# Patient Record
Sex: FEMALE | Race: AMERICAN INDIAN OR ALASKA NATIVE | NOT HISPANIC OR LATINO | Employment: OTHER | URBAN - METROPOLITAN AREA
[De-identification: names, ages, dates, MRNs, and addresses within clinical notes are randomized per-mention and may not be internally consistent; named-entity substitution may affect disease eponyms.]

---

## 2017-01-03 ENCOUNTER — PATIENT MESSAGE (OUTPATIENT)
Dept: NEUROLOGY | Facility: CLINIC | Age: 57
End: 2017-01-03

## 2017-01-03 ENCOUNTER — LAB VISIT (OUTPATIENT)
Dept: LAB | Facility: HOSPITAL | Age: 57
End: 2017-01-03
Attending: PSYCHIATRY & NEUROLOGY
Payer: MEDICARE

## 2017-01-03 DIAGNOSIS — Z79.899 HIGH RISK MEDICATION USE: ICD-10-CM

## 2017-01-03 DIAGNOSIS — G35 MULTIPLE SCLEROSIS: ICD-10-CM

## 2017-01-03 LAB
ALBUMIN SERPL BCP-MCNC: 3.8 G/DL
ALP SERPL-CCNC: 136 U/L
ALT SERPL W/O P-5'-P-CCNC: 32 U/L
AST SERPL-CCNC: 27 U/L
BASOPHILS # BLD AUTO: 0.03 K/UL
BASOPHILS NFR BLD: 0.3 %
BILIRUB DIRECT SERPL-MCNC: 0.2 MG/DL
BILIRUB SERPL-MCNC: 0.4 MG/DL
DIFFERENTIAL METHOD: NORMAL
EOSINOPHIL # BLD AUTO: 0.2 K/UL
EOSINOPHIL NFR BLD: 2.4 %
ERYTHROCYTE [DISTWIDTH] IN BLOOD BY AUTOMATED COUNT: 14.2 %
HCT VFR BLD AUTO: 41 %
HGB BLD-MCNC: 13.5 G/DL
LYMPHOCYTES # BLD AUTO: 3.4 K/UL
LYMPHOCYTES NFR BLD: 35.7 %
MCH RBC QN AUTO: 29.1 PG
MCHC RBC AUTO-ENTMCNC: 32.9 %
MCV RBC AUTO: 88 FL
MONOCYTES # BLD AUTO: 0.8 K/UL
MONOCYTES NFR BLD: 8.1 %
NEUTROPHILS # BLD AUTO: 5.1 K/UL
NEUTROPHILS NFR BLD: 53.3 %
PLATELET # BLD AUTO: 291 K/UL
PMV BLD AUTO: 10.6 FL
PROT SERPL-MCNC: 7.1 G/DL
RBC # BLD AUTO: 4.64 M/UL
WBC # BLD AUTO: 9.63 K/UL

## 2017-01-03 PROCEDURE — 36415 COLL VENOUS BLD VENIPUNCTURE: CPT | Mod: PO

## 2017-01-03 PROCEDURE — 85025 COMPLETE CBC W/AUTO DIFF WBC: CPT

## 2017-01-03 PROCEDURE — 80076 HEPATIC FUNCTION PANEL: CPT

## 2017-01-04 ENCOUNTER — TELEPHONE (OUTPATIENT)
Dept: PAIN MEDICINE | Facility: CLINIC | Age: 57
End: 2017-01-04

## 2017-01-04 NOTE — TELEPHONE ENCOUNTER
Spoke with patient. Informed her that her prescription was ready for . Patient stated she would be here tomorrow morning to pick it up. Made patient a follow up appointment on 1/19.

## 2017-01-13 ENCOUNTER — TELEPHONE (OUTPATIENT)
Dept: PAIN MEDICINE | Facility: CLINIC | Age: 57
End: 2017-01-13

## 2017-01-13 NOTE — TELEPHONE ENCOUNTER
----- Message from Ami Bone sent at 1/12/2017 11:43 AM CST -----  Contact: rizwana Reynas call  Back   MRI appt  Call back  [phone]

## 2017-01-16 ENCOUNTER — TELEPHONE (OUTPATIENT)
Dept: PAIN MEDICINE | Facility: CLINIC | Age: 57
End: 2017-01-16

## 2017-01-16 NOTE — TELEPHONE ENCOUNTER
Spoke with patient. Patient needed to reschedule follow up appointment and is requesting an MRI. Please advise.

## 2017-01-16 NOTE — TELEPHONE ENCOUNTER
----- Message from Josselin Baptiste sent at 1/13/2017  8:36 AM CST -----  Patient missed Sahla's call please call [phone] (cuvn)

## 2017-01-16 NOTE — TELEPHONE ENCOUNTER
Looks like there is an MRI order for the lumbar spine from Dr. Seaman on 11/22/16.  Is she requesting an MRI of the lumbar spine?

## 2017-01-18 ENCOUNTER — PATIENT MESSAGE (OUTPATIENT)
Dept: NEUROLOGY | Facility: CLINIC | Age: 57
End: 2017-01-18

## 2017-01-19 ENCOUNTER — TELEPHONE (OUTPATIENT)
Dept: PAIN MEDICINE | Facility: CLINIC | Age: 57
End: 2017-01-19

## 2017-01-19 DIAGNOSIS — Z13.9 SCREENING: Primary | ICD-10-CM

## 2017-01-19 DIAGNOSIS — M51.36 DDD (DEGENERATIVE DISC DISEASE), LUMBAR: Primary | ICD-10-CM

## 2017-01-19 NOTE — TELEPHONE ENCOUNTER
See email from 11/22/16.  Lumbar spine MRI was ordered so it would be available to neurosurgery.  She was going to see neurosurgery for her low back not her neck.  The injections were helping her neck not her back.

## 2017-01-19 NOTE — TELEPHONE ENCOUNTER
That's what I thought but that order is mixed up. Lumbar MRI with cervical diagnosis. Can you fix it or do we need a new order? Is this with contrast?

## 2017-01-19 NOTE — TELEPHONE ENCOUNTER
Please review patient clinical notes. Unsure what MRI the patient is supposed to have. The order is for lumbar, which looks right based upon her last MRI's but the diagnosis code indicates cervical. Does this need to be with and without and has the patient had back surgery. Please advise.

## 2017-01-19 NOTE — TELEPHONE ENCOUNTER
----- Message from Ling Rudolph sent at 1/18/2017  3:21 PM CST -----  Contact:   call //[phone]    Calling to   Speak to the  Nurse   // please call

## 2017-01-31 ENCOUNTER — LAB VISIT (OUTPATIENT)
Dept: LAB | Facility: HOSPITAL | Age: 57
End: 2017-01-31
Attending: ANESTHESIOLOGY
Payer: MEDICARE

## 2017-01-31 DIAGNOSIS — Z13.9 SCREENING: ICD-10-CM

## 2017-01-31 LAB
CREAT SERPL-MCNC: 0.8 MG/DL
EST. GFR  (AFRICAN AMERICAN): >60 ML/MIN/1.73 M^2
EST. GFR  (NON AFRICAN AMERICAN): >60 ML/MIN/1.73 M^2

## 2017-01-31 PROCEDURE — 36415 COLL VENOUS BLD VENIPUNCTURE: CPT | Mod: PO

## 2017-01-31 PROCEDURE — 82565 ASSAY OF CREATININE: CPT

## 2017-02-01 ENCOUNTER — OFFICE VISIT (OUTPATIENT)
Dept: PAIN MEDICINE | Facility: CLINIC | Age: 57
End: 2017-02-01
Payer: MEDICARE

## 2017-02-01 ENCOUNTER — HOSPITAL ENCOUNTER (OUTPATIENT)
Dept: RADIOLOGY | Facility: HOSPITAL | Age: 57
Discharge: HOME | End: 2017-02-01
Attending: ANESTHESIOLOGY
Payer: MEDICARE

## 2017-02-01 ENCOUNTER — TELEPHONE (OUTPATIENT)
Dept: PAIN MEDICINE | Facility: CLINIC | Age: 57
End: 2017-02-01

## 2017-02-01 VITALS
TEMPERATURE: 99 F | RESPIRATION RATE: 18 BRPM | HEART RATE: 76 BPM | WEIGHT: 212.94 LBS | BODY MASS INDEX: 34.9 KG/M2 | DIASTOLIC BLOOD PRESSURE: 74 MMHG | SYSTOLIC BLOOD PRESSURE: 120 MMHG

## 2017-02-01 DIAGNOSIS — M54.12 CERVICAL RADICULOPATHY: Primary | ICD-10-CM

## 2017-02-01 DIAGNOSIS — E11.9 CONTROLLED TYPE 2 DIABETES MELLITUS WITHOUT COMPLICATION, WITHOUT LONG-TERM CURRENT USE OF INSULIN: ICD-10-CM

## 2017-02-01 DIAGNOSIS — G35 MULTIPLE SCLEROSIS: ICD-10-CM

## 2017-02-01 DIAGNOSIS — M54.16 LUMBAR RADICULOPATHY: ICD-10-CM

## 2017-02-01 DIAGNOSIS — M51.36 DDD (DEGENERATIVE DISC DISEASE), LUMBAR: ICD-10-CM

## 2017-02-01 DIAGNOSIS — M96.1 POSTLAMINECTOMY SYNDROME OF LUMBAR REGION: ICD-10-CM

## 2017-02-01 PROCEDURE — 72148 MRI LUMBAR SPINE W/O DYE: CPT | Mod: 26,,, | Performed by: RADIOLOGY

## 2017-02-01 PROCEDURE — 2022F DILAT RTA XM EVC RTNOPTHY: CPT | Mod: S$GLB,,, | Performed by: PHYSICIAN ASSISTANT

## 2017-02-01 PROCEDURE — 99499 UNLISTED E&M SERVICE: CPT | Mod: S$PBB,,, | Performed by: PHYSICIAN ASSISTANT

## 2017-02-01 PROCEDURE — 3074F SYST BP LT 130 MM HG: CPT | Mod: S$GLB,,, | Performed by: PHYSICIAN ASSISTANT

## 2017-02-01 PROCEDURE — 99214 OFFICE O/P EST MOD 30 MIN: CPT | Mod: S$GLB,,, | Performed by: PHYSICIAN ASSISTANT

## 2017-02-01 PROCEDURE — 99999 PR PBB SHADOW E&M-EST. PATIENT-LVL V: CPT | Mod: PBBFAC,,, | Performed by: PHYSICIAN ASSISTANT

## 2017-02-01 PROCEDURE — 3045F PR MOST RECENT HEMOGLOBIN A1C LEVEL 7.0-9.0%: CPT | Mod: S$GLB,,, | Performed by: PHYSICIAN ASSISTANT

## 2017-02-01 PROCEDURE — 4010F ACE/ARB THERAPY RXD/TAKEN: CPT | Mod: S$GLB,,, | Performed by: PHYSICIAN ASSISTANT

## 2017-02-01 PROCEDURE — 3078F DIAST BP <80 MM HG: CPT | Mod: S$GLB,,, | Performed by: PHYSICIAN ASSISTANT

## 2017-02-01 RX ORDER — HYDROCODONE BITARTRATE AND ACETAMINOPHEN 10; 325 MG/1; MG/1
1 TABLET ORAL 4 TIMES DAILY PRN
Qty: 120 TABLET | Refills: 0 | Status: ON HOLD | OUTPATIENT
Start: 2017-02-17 | End: 2017-03-20 | Stop reason: HOSPADM

## 2017-02-01 RX ORDER — HYDROCODONE BITARTRATE AND ACETAMINOPHEN 10; 325 MG/1; MG/1
1 TABLET ORAL 4 TIMES DAILY PRN
Qty: 120 TABLET | Refills: 0 | Status: SHIPPED | OUTPATIENT
Start: 2017-03-19 | End: 2017-04-18

## 2017-02-01 RX ORDER — MIDAZOLAM HYDROCHLORIDE 5 MG/ML
4 INJECTION INTRAMUSCULAR; INTRAVENOUS ONCE
Status: CANCELLED | OUTPATIENT
Start: 2017-02-21

## 2017-02-01 RX ORDER — ALPRAZOLAM 0.5 MG/1
1 TABLET, ORALLY DISINTEGRATING ORAL ONCE AS NEEDED
Status: CANCELLED | OUTPATIENT
Start: 2017-03-20 | End: 2017-03-20

## 2017-02-01 RX ORDER — SODIUM CHLORIDE, SODIUM LACTATE, POTASSIUM CHLORIDE, CALCIUM CHLORIDE 600; 310; 30; 20 MG/100ML; MG/100ML; MG/100ML; MG/100ML
INJECTION, SOLUTION INTRAVENOUS CONTINUOUS
Status: CANCELLED | OUTPATIENT
Start: 2017-02-21

## 2017-02-01 RX ORDER — HYDROCODONE BITARTRATE AND ACETAMINOPHEN 10; 325 MG/1; MG/1
1 TABLET ORAL 4 TIMES DAILY PRN
Qty: 120 TABLET | Refills: 0 | Status: SHIPPED | OUTPATIENT
Start: 2017-04-18 | End: 2017-05-16 | Stop reason: SDUPTHER

## 2017-02-01 NOTE — TELEPHONE ENCOUNTER
Patient saw Francisco Trivedi this morning and has recommended a cervical steroid injection as well as a lumbar steroid injection. The patient will need to stop aspirin 7 days prior to the injections tentatively scheduled for 2/21 and 3/20, pending medication approval. Please advise.

## 2017-02-01 NOTE — PROGRESS NOTES
This note was completed with dictation software and grammatical errors may exist.    CC: Back pain     HPI: The patient is a 56-year-old woman with a history of hypertension, diabetes, multiple sclerosis, lumbar postlaminectomy syndrome who presents in self-referral for continued low back pain and bilateral leg pain, neck pain.  She returns in follow-up today with neck pain and back pain.  She complains of neck pain radiating to her arms and low back pain radiating throughout the entire right greater than left legs.  She states that her legs only hurt her when walking.  Her neck pain and arm pain is constant and currently her worst pain.  She complains of muscle spasms in her right greater than left feet.  She reports numbness in her hands and right toes.  She complains of right greater than left upper she may weakness and right lower extreme a weakness.  She reports intermittent bladder and bowel incontinence.    Her main complaint today is bilateral upper arm pain, slightly worse on the left side.  It seems to be throughout the biceps and triceps, she denies major shoulder pain, does have some neck pain.  She has pain when at rest, slightly worse with range of motion of the arms, has difficulty and weakness raising her arms overhead or behind her which makes the pain worse.  She denies any constant numbness but does feel weakness throughout her hands that has been occurring more throughout the last month.  She denies any bowel or bladder incontinence.  She continues to have low back pain above her fusion and below her fusion with right leg pain.  She is status post C7-T1 cervical interlaminar epidural steroid injection on 9/7/16 with 100% relief of her bilateral arm pain and mild relief of her neck pain.     Pain intervention history: She has been followed by one of our previous [facility] pain management physicians, Dr. Chapa in [place] until recently.  She had undergone fusion surgery in 1983 and again surgery  in 1993 for her lumbar spine.  She had undergone numerous lumbar spine injections in the past with good relief.  She has tried gabapentin with only minimal relief but states that Lyrica works best.  Sounds like she had a spinal cord stimulator trial in the past that did not help.  She has been taking hydrocodone up to 4 times a day for several years.  She is status post caudal epidural steroid injection on 8/6/14 with 60% relief.  She is status post caudal epidural steroid injection on 5/13/15 with 0% relief.  She is status post caudal epidural steroid injection on 10/2/15 with 0% relief.     ROS:She reports urinary frequency, joint stiffness, joint swelling, back pain, memory loss, anxiety, depression, difficulty sleeping and loss of balance.  Balance of review of systems is negative.    Medical, surgical, family and social history reviewed elsewhere in record.    Medications/Allergies: See med card    Vitals:    02/01/17 0912   BP: 120/74   Pulse: 76   Resp: 18   Temp: 98.9 °F (37.2 °C)   TempSrc: Oral   Weight: 96.6 kg (212 lb 15.4 oz)   PainSc:   6   PainLoc: Shoulder         Physical exam:  Gen: A and O x3, pleasant, well-groomed  Skin: No rashes or obvious lesions  HEENT: PERRLA  CVS: Regular rate and rhythm, normal S1 and S2, no murmurs.  Resp: Clear to auscultation bilaterally, no wheezes or rales.  Abdomen: Soft, NT/ND, normal bowel sounds present.  Musculoskeletal: Antalgic gait, slow cautious, kyphotic stance.      Neuro:  Upper extremities: 5/5 strength bilaterally  Lower extremities: 5/5 strength bilaterally, 4/5 strength right foot dorsiflexion and right hip flexion  Reflexes: Brachioradialis 2+, Bicep 2+, Tricep 0+. Patellar 0+, Achilles 0+ bilaterally.  Sensory: Intact and symmetrical to light touch and pinprick in C2-T1 dermatomes bilaterally.Intact and symmetrical to light touch and pinprick in L2-S1 dermatomes bilaterally.    Lumbar spine:  Lumbar spine: Range of motion is moderately decreased  with flexion and extension with moderate increased bilateral low back pain.    Jeff's test causes no increased pain on either side.    Supine straight leg raise is positive for back pain only.  Internal and external rotation of the hip causes no increased on either side.  Myofascial exam: Mild tenderness to palpation across the lumbar paraspinous muscles.     Cervical range of motion full with flexion and extension and lateral rotation with increased pain on extension and lateral rotation causing pain radiating down into the shoulders.    Spurling's test positive for neck pain and shoulder pain.  Myofascial exam: Mild tenderness palpation to the bilateral cervical paraspinous muscles.    Imaging:  3/11/14 MRI L-spine  At the T9-T10 level a disk protrusion appears to be present of approximately 3 mm not entirely visualized. At least mild central canal stenosis is noted. No obvious cord contact is noted.  At the T10-T11 level, disk protrusion appears to present a 4 mm. Moderate bilateral neural foraminal stenosis is noted. Mild central canal stenosis is noted. No definitive cord contact is noted.  At the T11-T12 level, a broad based disk protrusion is noted of approximately 3 to 4 mm paracentric to the right and left. Mild to moderate bilateral neural foraminal narrowing is noted. Mild central canal narrowing is noted.  At the T12-L1 level, disk protrusion appears to be present paracentric to the right of approximately 4 mm. Mild neuroforaminal narrowing is noted. Mild central canal stenosis is noted.  At the L1-L2 level, it is difficult to assess disk bulge or central canal stenosis or neural foraminal narrowing.  At the L2-L3 level, no significant disk bulge, central canal stenosis, or neural foraminal stenosis noted.  At the L3-L4 level, no significant central canal stenosis or neural foraminal stenosis is suspected. Evaluation is moderately hindered by metallic artifact.  At the L4-L5 level, no significant disk  bulge, central canal stenosis, or neural foraminal stenosis is noted. Metallic artifact moderately hinders evaluation.  At the L5-S1 level, uncovering of the uncovertebral disk is noted. Mild neural foramina narrowing is suspected bilaterally. Mild central canal stenosis is suspected.    2/24/14: Flex/ext Xray: Extensive postsurgical changes noted involving the spine with orthopedic rods along with pedicle screws at the L2 L4 and S1 levels. A mild levocurvature is noted centered at the L1-L2 level  One of the screws presumably a right sided screw at the S1 level appears to have fractured.   Disk prosthesis with partial osseous fusion is suspected at the L2-L3 L3-L4 L4-L5 levels. Intervertebral disk height loss is noted at the L5-S1 level.   An anterior listhesis is noted at the L5-S1 level a 1 cm. this appears stable on flexion and extension  Intervertebral disk height loss is noted at the L1-L2 and T12-L1 levels as well as at the T11-T12 level.    3/11/14 MRI C-spine  C2/C3: Minimal left-sided uncovertebral and is neural foraminal narrowing is noted. The central canal is intact.  C3/C4: Mild annular disk bulge and mild uncovertebral induced neural foraminal narrowing is noted bilaterally. The central canal is intact.  C4/C5: There is annular disk bulging and there is mild to moderate uncovertebral and facet induced neural foraminal narrowing bilaterally.  C5/C6: There is a mild central and right paracentral broad-based disk protrusion and there is prominent degenerative facet disease. This and the anterior subluxation produce mild canal stenosis. The central canal is narrowed to 9 mm. There is moderate uncovertebral and facet induced neural foraminal narrowing bilaterally.  C6/C7: There is mild disk bulging and mild uncovertebral induced neural foraminal narrowing is noted. The cord does not appear contacted.  C7/T1: There is a small right paracentral disk extrusion causing mild distortion of the central canal. The  foramina are intact.    MRI lumbar spine with and without contrast 12/4/15  T11-12:There is chronic disk degeneration with disk dehydration, disk narrowing, vertebral endplate osteophytes, and degenerative vertebral endplate marrow change. There is a diffuse 2 mm posterior disk bulge causing mild dural compression and moderate bilateral foraminal stenosis. There has been no change.  T12-L1:There is chronic disk degeneration with disk dehydration, disk narrowing, vertebral end plate osteophytes, and degenerative T1 plate marrow change. There is a diffuse 2-mm posterior disk bulge causing mild thecal sac compression and moderate bilateral foraminal stenosis. There has been no change.  L1-2:There is a broad-based 3.5-mm posterior disk extrusion causing moderate thecal sac compression and severe bilateral foraminal stenosis. There has been no change.  L2-3:There is intervertebral body fusion which is well united and well aligned. There is no compromise of the spinal canal or foramina and there has been no change.  L3-4:There is intervertebral body fusion which is well united and well aligned. There is no compromise of the spinal canal or foramina and there has been no change.  L4-5:There is intervertebral body fusion which is well united and well aligned. There is no compromise of the spinal canal or foramina and there has been no change.  L5-S1:There is chronic disk degeneration with disk dehydration and narrowing. There is chronic 5-mm anterior L5 subluxation causing severe left and moderate right foraminal stenosis. There has been no change.    MRI lumbar spine 2/1/17  There are postoperative changes of posterior instrumented fusion at L2-S1.  There is a grade I anterolisthesis of L5 on S1.  The lumbar vertebral bodies show normal height without evidence of acute compression fracture or pathologic marrow replacement process.  There is a levoconvex curvature of the lumbar spine. There is degenerative desiccation, disc  space narrowing, and degenerative endplate change at T11-T12, T12-L1, L1-L2, and L5-S1.  There is near complete fusion of the L2, L3, L4, and L5 vertebral bodies.  There are probable postoperative changes of posterior decompression at L2-S1. The conus medullaris terminates at L1. The visualized retroperitoneal/abdominal soft tissue  structures are unremarkable.  T11-T12: There is a broad disc bulge which flattens the ventral aspect of the conus.  There is ligamentum flavum thickening and facet arthropathy.  There is moderate bilateral neuroforaminal stenosis present.  There is mild-moderate central canal stenosis.  T12-L1: There is a broad disc bulge with a superimposed ascending right paracentral disc extrusion which extends approximately 10 mm above the disc plane.  There is mild-moderate central canal stenosis present.  There is mild right neuroforaminal stenosis.  L1-L2: There is a broad disc bulge throughout the period there is ligamentum flavum thickening.  The central canal is obscured.  There is a suggestion of a possible right paracentral disc protrusion.  There is mild-moderate overall central canal stenosis.  The neural foramina are not assessed secondary to metal artifact.  L2-L3: No central canal or neuroforaminal stenosis. No disc protrusion or extrusion.  L3-L4: No central canal or neuroforaminal stenosis. No disc protrusion or extrusion.  L4-L5: The left neural foramen is not optimally evaluated due to metal artifact.  No central canal stenosis or right neuroforaminal stenosis.  L5-S1: There is a grade I anterolisthesis of L5 on S1 with associated uncovering of the intervertebral disc.  No definite central canal stenosis or neuroforaminal stenosis.      Assessment:  The patient is a 56-year-old woman with a history of hypertension, diabetes, multiple sclerosis, lumbar postlaminectomy syndrome who presents in self-referral for continued low back pain and bilateral leg pain, neck pain.   1. Cervical  radiculopathy  Vital signs    Activity as tolerated    Place 18-22 gauage peripheral IV     Verify informed consent    Notify physician     Notify physician     Notify physician (specify)    Diet NPO    Case Request Operating Room: INJECTION-STEROID-EPIDURAL-CERVICAL    Place in Outpatient    lactated ringers infusion    midazolam (PF) 5 mg/mL injection 4 mg   2. Lumbar radiculopathy  Place in Outpatient    Vital signs    Activity as tolerated    Verify informed consent    Notify physician     Notify physician     Notify physician (specify)    Diet NPO    alprazolam ODT dissolvable tablet 1 mg    Case Request Operating Room: ROBIN-TRANSFORAMINAL L2   3. Multiple sclerosis     4. Controlled type 2 diabetes mellitus without complication, without long-term current use of insulin  HEMOGLOBIN A1C   5. Postlaminectomy syndrome of lumbar region                                                                                                                                                                                                 Plan:  1.  I reviewed the patient's lumbar spine MRI with her and we discussed that she has stenosis above her fusion at L1/2.  Currently her neck is bothering her more than her back.  I'll schedule her for a cervical ROBIN followed by bilateral L1 TF ROBIN's 4 weeks later.  She is going to see neurosurgery to discuss if she is a surgical candidate for her back.  I have ordered a hemoglobin A1c to check her blood glucose prior to giving her steroids.  2.  Dr. Seaman provided prescriptions for hydrocodone-acetaminophen 10/325 mg up to 4 times a day as needed for pain.  3.  Follow-up in 4 weeks after the lumbar ROBIN's or sooner as needed.    Greater than 50% of this 25 minute visit was spent on counseling the patient.

## 2017-02-02 ENCOUNTER — TELEPHONE (OUTPATIENT)
Dept: PAIN MEDICINE | Facility: CLINIC | Age: 57
End: 2017-02-02

## 2017-02-02 NOTE — TELEPHONE ENCOUNTER
Please let the patient know that her hemoglobin A1c improved to 7.4 and we can proceed with the epidural steroid injections as planned.

## 2017-02-06 ENCOUNTER — PATIENT MESSAGE (OUTPATIENT)
Dept: SPINE | Facility: CLINIC | Age: 57
End: 2017-02-06

## 2017-02-07 ENCOUNTER — PATIENT MESSAGE (OUTPATIENT)
Dept: NEUROLOGY | Facility: CLINIC | Age: 57
End: 2017-02-07

## 2017-02-16 ENCOUNTER — OFFICE VISIT (OUTPATIENT)
Dept: NEUROLOGY | Facility: CLINIC | Age: 57
End: 2017-02-16
Payer: MEDICARE

## 2017-02-16 ENCOUNTER — LAB VISIT (OUTPATIENT)
Dept: LAB | Facility: HOSPITAL | Age: 57
End: 2017-02-16
Payer: MEDICARE

## 2017-02-16 VITALS
BODY MASS INDEX: 33.43 KG/M2 | WEIGHT: 208 LBS | SYSTOLIC BLOOD PRESSURE: 146 MMHG | DIASTOLIC BLOOD PRESSURE: 89 MMHG | HEIGHT: 66 IN | HEART RATE: 76 BPM

## 2017-02-16 DIAGNOSIS — G35 MULTIPLE SCLEROSIS: Primary | ICD-10-CM

## 2017-02-16 DIAGNOSIS — R26.9 GAIT DISTURBANCE: ICD-10-CM

## 2017-02-16 DIAGNOSIS — Z29.89 PROPHYLACTIC IMMUNOTHERAPY: ICD-10-CM

## 2017-02-16 DIAGNOSIS — R25.2 SPASTICITY: ICD-10-CM

## 2017-02-16 DIAGNOSIS — Z71.89 COUNSELING REGARDING GOALS OF CARE: ICD-10-CM

## 2017-02-16 DIAGNOSIS — G35 MULTIPLE SCLEROSIS: ICD-10-CM

## 2017-02-16 DIAGNOSIS — Z97.3 WEARS GLASSES: ICD-10-CM

## 2017-02-16 DIAGNOSIS — R39.11 URINARY HESITANCY: ICD-10-CM

## 2017-02-16 LAB
BILIRUB UR QL STRIP: NEGATIVE
CLARITY UR REFRACT.AUTO: CLEAR
COLOR UR AUTO: YELLOW
GLUCOSE UR QL STRIP: NEGATIVE
HGB UR QL STRIP: NEGATIVE
KETONES UR QL STRIP: NEGATIVE
LEUKOCYTE ESTERASE UR QL STRIP: NEGATIVE
NITRITE UR QL STRIP: NEGATIVE
PH UR STRIP: 7 [PH] (ref 5–8)
PROT UR QL STRIP: ABNORMAL
SP GR UR STRIP: 1.02 (ref 1–1.03)
URN SPEC COLLECT METH UR: ABNORMAL
UROBILINOGEN UR STRIP-ACNC: 1 EU/DL

## 2017-02-16 PROCEDURE — 99999 PR PBB SHADOW E&M-EST. PATIENT-LVL V: CPT | Mod: PBBFAC,,, | Performed by: CLINICAL NURSE SPECIALIST

## 2017-02-16 PROCEDURE — 87086 URINE CULTURE/COLONY COUNT: CPT

## 2017-02-16 PROCEDURE — 99215 OFFICE O/P EST HI 40 MIN: CPT | Mod: S$GLB,,, | Performed by: CLINICAL NURSE SPECIALIST

## 2017-02-16 PROCEDURE — 3077F SYST BP >= 140 MM HG: CPT | Mod: S$GLB,,, | Performed by: CLINICAL NURSE SPECIALIST

## 2017-02-16 PROCEDURE — 81003 URINALYSIS AUTO W/O SCOPE: CPT

## 2017-02-16 PROCEDURE — 3079F DIAST BP 80-89 MM HG: CPT | Mod: S$GLB,,, | Performed by: CLINICAL NURSE SPECIALIST

## 2017-02-16 RX ORDER — BACLOFEN 10 MG/1
TABLET ORAL
Qty: 45 TABLET | Refills: 3 | Status: SHIPPED | OUTPATIENT
Start: 2017-02-16 | End: 2017-04-06 | Stop reason: SDUPTHER

## 2017-02-16 NOTE — PROGRESS NOTES
Subjective:       Patient ID: Justin Barnes is a 56 y.o. female who presents today for a routine clinic visit for MS. She was last seen in November. The history has been provided by the patient.  MS HPI:  · DMT:Aubagio 14mg daily for about 3 months.  She gets diarrhea every day. Some days are worse than others. She does not have any pain associated with this. She takes Imodium as needed.   · Side effects from DMT: As above, diarrhea, but this is tolerable for her.   · Taking vitamin D3 as recommended? Yes -  Dose: 10,000 IU  She is under a lot of stress right now. Her  has pancreatic cancer and is getting chemo.   Overall, she feels better from an MS perspective, but she continues to struggle with pain and fatigue. She is not sure if any of her pain is coming from MS.  She feels that her pain impairs her walking. She has neck pain and pain in both arms (right worse than left). She has had numbness and tingling in the right fingers since Monday. She sees pain management next Monday and will get an injection in her neck then and a lumbar injection in March. She has an appt to see Back [facility] Sp[facility] soon.   She continues to have headaches, but has not seen Lokesh Santo yet. She takes Aleve, but this is not helping. Some days are worse than others.   She is still smoking, but would like to start this in July, if possible.     SOCIAL HISTORY  Social History   Substance Use Topics    Smoking status: Current Every Day Smoker     Packs/day: 0.50     Types: Cigarettes     Start date: 5/7/1973    Smokeless tobacco: Never Used    Alcohol use No     Living arrangements - the patient lives with her . She is staying in between her son's house and daughter's house. She and her  do not have their own home currently.   Employment: She is on disability.     MS ROS:  · Fatigue: Yes - She has significant fatigue.   · Sleep Disturbance: Yes -Pain interferes with her sleep at night. She feels like her whole  "body is restless at night.   · Bladder Dysfunction: Yes -She denies incontinence. She has trouble emptying completely. She has seen a urologist in the past. She states "my bladder and my rectum has dropped." She thinks she had a UTI since the last visit (about 3 weeks ago). She is waking up less at night since starting oxybutynin at bedtime.   · Bowel Dysfunction: As above, she has diarrhea from Aubagio.   · Spasticity: Yes - She feels like her spasms have improved. She is taking Baclofen 10mg and Tizanidine 8mg at night.   · Visual Symptoms: Yes - She feels like her vision has declined. She needs to see her eye doctor. She is only wearing reading glasses.   · Cognitive: Yes - Stable.   · Mood Disorder: Yes - She feels like her mood has been "really well." She cries a lot, but overall, feels ok. She takes Effexor and Cymbalta.   · Gait Disturbance: Yes - She "starts off good," but walking declines with time or distance.   · Falls: She had a fall about 1.5 weeks ago. She loses her balance at times.   · Hand Dysfunction: Yes - She has impaired fine motor coordination in both hands.   · Pain: Yes - She has chronic back and neck pain pain that limits her activities. She also has arm pain and headaches. She sees pain management.   · Sexual Dysfunction: Not Assessed  · Skin Breakdown: No  · Tremors: No.   · Dysphagia:  No  · Dysarthria:  No.   · Heat sensitivity:  No  · Any un-met adaptive needs? No  · Copay Assist?  $0 copay for Aubagio        Objective:        1. 25 foot timed walk: 5.7 seconds today without assist; was 8.6 seconds at last visit without assist; antalgic gait   Timed 25 Foot Walk: 10/3/2016   Did patient wear an AFO? No   Was assistive device used? No   Time for 25 Foot Walk (seconds) 4.98   Time for 25 Foot Walk (seconds) 5       Neurologic Exam     Mental Status   Oriented to person, place, and time.   Attention: normal.   Speech: speech is normal   Level of consciousness: alert  Knowledge: good. "     Cranial Nerves     CN III, IV, VI   Pupils are equal, round, and reactive to light.  Extraocular motions are normal.   Right pupil: Shape: regular. Reactivity: brisk.   Left pupil: Shape: regular. Reactivity: brisk.   CN III: no CN III palsy  CN VI: no CN VI palsy  Nystagmus: none     CN V   Facial sensation intact.     CN VII   Right facial weakness: none  Left facial weakness: none    CN VIII   Hearing: intact    CN IX, X   Palate: symmetric    CN XI   Right sternocleidomastoid strength: normal  Left sternocleidomastoid strength: normal  Right trapezius strength: normal  Left trapezius strength: normal    CN XII   Tongue deviation: none    Motor Exam     Strength   Right neck flexion: 5/5  Left neck flexion: 5/5  Right neck extension: 5/5  Left neck extension: 5/5  Right deltoid: 5/5  Left deltoid: 5/5  Right biceps: 5/5  Left biceps: 5/5  Right triceps: 5/5  Left triceps: 5/5  Right wrist flexion: 5/5  Left wrist flexion: 5/5  Right wrist extension: 5/5  Left wrist extension: 5/5  Right interossei: 5/5  Left interossei: 5/5  Right iliopsoas: 5/5  Left iliopsoas: 5/5  Right quadriceps: 5/5  Left quadriceps: 5/5  Right hamstrin/5  Left hamstrin/5  Right anterior tibial: 5/5  Left anterior tibial: 5/5  Right gastroc: 5/5  Left gastroc: 5/5    Sensory Exam   Right arm vibration: decreased from fingers  Left arm vibration: decreased from fingers  Right leg vibration: decreased from knee  Left leg vibration: decreased from knee    Gait, Coordination, and Reflexes     Gait  Gait: (antalgic and slightly wide-based)    Coordination   Romberg: positive  Finger to nose coordination: normal  Tandem walking coordination: normal    Tremor   Resting tremor: absent    Reflexes   Right brachioradialis: 2+  Left brachioradialis: 2+  Right biceps: 2+  Left biceps: 2+  Right patellar: 2+  Left patellar: 2+  Right achilles: 2+  Left achilles: 2+  Right plantar: equivocal  Left plantar: equivocal        Labs:       Lab  Results   Component Value Date    WBC 9.26 02/16/2017    HGB 13.8 02/16/2017    HCT 41.9 02/16/2017    MCV 88 02/16/2017     02/16/2017       Lab Results   Component Value Date    ALT 32 01/03/2017    AST 27 01/03/2017    ALKPHOS 136 (H) 01/03/2017    BILITOT 0.4 01/03/2017       Diagnosis/Assessment/Plan:    1. Multiple Sclerosis  · Assessment: Ms. Barnes's timed walk is nearly 3 seconds faster today than at last visit, and the remainder of her exam is stable. She has some mild swelling and pain in her right arm today that is different than her usual pain, and I have advised that she go to urgent care or PCP if this worsens. It is not warm to touch, but she reports that it is tender to touch. Her neck and back pain, in general, affect her gait and movement, but overall, things are improved today. She will follow up with pain management and back and spine as scheduled.   · Imaging: Will plan for brain MRI 6 months after Aubagio start--June 2017. MRI ordered today.   · Disease Modifying Therapies: Continue Aubagio. Will continue to do CBC and LFTs monthly for 1st 6 months of treatment, then every 3 months thereafter. Labs were done this morning. Continue Vitamin D. Will check Vitamin D with next month's labs.     2. MS Symptom Assessment / Management  · Bladder Dysfunction: Continue oxybutynin. Will check UA and culture today to make sure infection is gone. Will also refer to urology.   · Spasticity: Continue taking Baclofen 10mg at bedtime and 5mg midday. Rx refilled today.   · Vision: Referral for optometry entered.   · Mood Disorder: Continue Effexor and Cymbalta.   · Gait Disturbance:We will plan to order PT and aquatherapy during the summer when she has a more established residence and her  has finished chemo.   · Pain: Follow-up with pain management and   · Other: Patients plans to pursue smoking cessation program during the summer.     Over 50% of this 50 minute visit was spent in direct face to  face counseling of the patient about MS and the management of her symptoms.The patient agrees with the plan of care. She will follow up with Dr. Martinez in June after MRI.     There are no diagnoses linked to this encounter.

## 2017-02-18 LAB — BACTERIA UR CULT: NORMAL

## 2017-02-20 RX ORDER — TERIFLUNOMIDE 14 MG/1
TABLET, FILM COATED ORAL
COMMUNITY
End: 2017-05-01 | Stop reason: SDUPTHER

## 2017-02-21 ENCOUNTER — SURGERY (OUTPATIENT)
Age: 57
End: 2017-02-21

## 2017-02-21 ENCOUNTER — HOSPITAL ENCOUNTER (OUTPATIENT)
Dept: RADIOLOGY | Facility: HOSPITAL | Age: 57
Discharge: HOME | End: 2017-02-21
Attending: ANESTHESIOLOGY
Payer: MEDICARE

## 2017-02-21 ENCOUNTER — HOSPITAL ENCOUNTER (OUTPATIENT)
Facility: HOSPITAL | Age: 57
Discharge: HOME | End: 2017-02-21
Attending: ANESTHESIOLOGY | Admitting: ANESTHESIOLOGY
Payer: MEDICARE

## 2017-02-21 VITALS
OXYGEN SATURATION: 95 % | RESPIRATION RATE: 16 BRPM | WEIGHT: 208 LBS | SYSTOLIC BLOOD PRESSURE: 165 MMHG | BODY MASS INDEX: 33.43 KG/M2 | HEART RATE: 78 BPM | TEMPERATURE: 98 F | HEIGHT: 66 IN | DIASTOLIC BLOOD PRESSURE: 79 MMHG

## 2017-02-21 DIAGNOSIS — E11.9 CONTROLLED TYPE 2 DIABETES MELLITUS WITHOUT COMPLICATION, WITHOUT LONG-TERM CURRENT USE OF INSULIN: ICD-10-CM

## 2017-02-21 DIAGNOSIS — M50.30 DDD (DEGENERATIVE DISC DISEASE), CERVICAL: ICD-10-CM

## 2017-02-21 DIAGNOSIS — M54.12 CERVICAL RADICULOPATHY: Primary | ICD-10-CM

## 2017-02-21 LAB — GLUCOSE SERPL-MCNC: 85 MG/DL (ref 70–110)

## 2017-02-21 PROCEDURE — 82962 GLUCOSE BLOOD TEST: CPT | Mod: PO | Performed by: ANESTHESIOLOGY

## 2017-02-21 PROCEDURE — 62320 NJX INTERLAMINAR CRV/THRC: CPT | Mod: PO | Performed by: ANESTHESIOLOGY

## 2017-02-21 PROCEDURE — 62321 NJX INTERLAMINAR CRV/THRC: CPT | Mod: ,,, | Performed by: ANESTHESIOLOGY

## 2017-02-21 PROCEDURE — 63600175 PHARM REV CODE 636 W HCPCS: Mod: PO | Performed by: ANESTHESIOLOGY

## 2017-02-21 PROCEDURE — 62321 NJX INTERLAMINAR CRV/THRC: CPT | Mod: PO | Performed by: ANESTHESIOLOGY

## 2017-02-21 PROCEDURE — 25500020 PHARM REV CODE 255: Mod: PO | Performed by: ANESTHESIOLOGY

## 2017-02-21 PROCEDURE — 25000003 PHARM REV CODE 250: Mod: PO | Performed by: ANESTHESIOLOGY

## 2017-02-21 RX ORDER — LIDOCAINE HYDROCHLORIDE 10 MG/ML
INJECTION INFILTRATION; PERINEURAL
Status: DISCONTINUED | OUTPATIENT
Start: 2017-02-21 | End: 2017-02-21 | Stop reason: HOSPADM

## 2017-02-21 RX ORDER — LIDOCAINE HYDROCHLORIDE 10 MG/ML
1 INJECTION INFILTRATION; PERINEURAL ONCE
Status: COMPLETED | OUTPATIENT
Start: 2017-02-21 | End: 2017-02-21

## 2017-02-21 RX ORDER — MIDAZOLAM HYDROCHLORIDE 1 MG/ML
4 INJECTION INTRAMUSCULAR; INTRAVENOUS ONCE
Status: COMPLETED | OUTPATIENT
Start: 2017-02-21 | End: 2017-02-21

## 2017-02-21 RX ORDER — SODIUM CHLORIDE 9 MG/ML
INJECTION, SOLUTION INTRAMUSCULAR; INTRAVENOUS; SUBCUTANEOUS
Status: DISCONTINUED | OUTPATIENT
Start: 2017-02-21 | End: 2017-02-21 | Stop reason: HOSPADM

## 2017-02-21 RX ORDER — METHYLPREDNISOLONE ACETATE 80 MG/ML
INJECTION, SUSPENSION INTRA-ARTICULAR; INTRALESIONAL; INTRAMUSCULAR; SOFT TISSUE
Status: DISCONTINUED | OUTPATIENT
Start: 2017-02-21 | End: 2017-02-21 | Stop reason: HOSPADM

## 2017-02-21 RX ORDER — SODIUM CHLORIDE, SODIUM LACTATE, POTASSIUM CHLORIDE, CALCIUM CHLORIDE 600; 310; 30; 20 MG/100ML; MG/100ML; MG/100ML; MG/100ML
INJECTION, SOLUTION INTRAVENOUS CONTINUOUS
Status: DISCONTINUED | OUTPATIENT
Start: 2017-02-21 | End: 2017-02-21 | Stop reason: HOSPADM

## 2017-02-21 RX ADMIN — SODIUM CHLORIDE, SODIUM LACTATE, POTASSIUM CHLORIDE, AND CALCIUM CHLORIDE: .6; .31; .03; .02 INJECTION, SOLUTION INTRAVENOUS at 12:02

## 2017-02-21 RX ADMIN — LIDOCAINE HYDROCHLORIDE 1 ML: 10 INJECTION, SOLUTION EPIDURAL; INFILTRATION; INTRACAUDAL; PERINEURAL at 12:02

## 2017-02-21 RX ADMIN — MIDAZOLAM HYDROCHLORIDE 2 MG: 1 INJECTION, SOLUTION INTRAMUSCULAR; INTRAVENOUS at 01:02

## 2017-02-21 RX ADMIN — SODIUM CHLORIDE 4 ML: 9 INJECTION INTRAMUSCULAR; INTRAVENOUS; SUBCUTANEOUS at 01:02

## 2017-02-21 RX ADMIN — IOHEXOL 3 ML: 300 INJECTION, SOLUTION INTRAVENOUS at 01:02

## 2017-02-21 RX ADMIN — METHYLPREDNISOLONE ACETATE 80 MG: 80 INJECTION, SUSPENSION INTRA-ARTICULAR; INTRALESIONAL; INTRAMUSCULAR; SOFT TISSUE at 01:02

## 2017-02-21 RX ADMIN — LIDOCAINE HYDROCHLORIDE 5 ML: 10 INJECTION, SOLUTION INFILTRATION; PERINEURAL at 01:02

## 2017-02-21 NOTE — IP AVS SNAPSHOT
[facility]  [address]  [place] [address]  Phone: [phone]           Patient Discharge Instructions     Our goal is to set you up for success. This packet includes information on your condition, medications, and your home care. It will help you to care for yourself so you don't get sicker and need to go back to the hospital.     Please ask your nurse if you have any questions.        There are many details to remember when preparing to leave the hospital. Here is what you will need to do:    1. Take your medicine. If you are prescribed medications, review your Medication List in the following pages. You may have new medications to  at the pharmacy and others that you'll need to stop taking. Review the instructions for how and when to take your medications. Talk with your doctor or nurses if you are unsure of what to do.     2. Go to your follow-up appointments. Specific follow-up information is listed in the following pages. Your may be contacted by a transition nurse or clinical provider about future appointments. Be sure we have all of the phone numbers to reach you, if needed. Please contact your provider's office if you are unable to make an appointment.     3. Watch for warning signs. Your doctor or nurse will give you detailed warning signs to watch for and when to call for assistance. These instructions may also include educational information about your condition. If you experience any of warning signs to your health, call your doctor.               [facility]  Unless otherwise directed by your provider, please contact [facility], our nurse care line that is available for 24/7 assistance.     [phone] (toll-free)    Registered nurses in the [facility] provide clinical advisement, health education, appointment booking, and other advisory services.                    ** Verify the list of medication(s) below is accurate and up  to date. Carry this with you in case of emergency. If your medications have changed, please notify your healthcare provider.             Medication List      CONTINUE taking these medications        Additional Info                      amlodipine-benazepril 10-20mg 10-20 mg per capsule   Commonly known as:  LOTREL   Quantity:  90 capsule   Refills:  1   Comments:  PATIENT WANTS 90 DAYS SUPPLY JUST LEFT OFFICE AND SAID SHE REQUESTED THAT    Instructions:  Take 1 capsule by mouth once daily.     Begin Date    AM    Noon    PM    Bedtime       aspirin 81 MG EC tablet   Commonly known as:  ECOTRIN   Refills:  0   Dose:  81 mg    Instructions:  Take 1 tablet (81 mg total) by mouth once daily.     Begin Date    AM    Noon    PM    Bedtime       atorvastatin 80 MG tablet   Commonly known as:  LIPITOR   Quantity:  90 tablet   Refills:  3   Dose:  80 mg    Instructions:  Take 1 tablet (80 mg total) by mouth once daily.     Begin Date    AM    Noon    PM    Bedtime       AUBAGIO 14 mg Tab   Refills:  0   Generic drug:  teriflunomide    Instructions:  Take by mouth.     Begin Date    AM    Noon    PM    Bedtime       * baclofen 10 MG tablet   Commonly known as:  LIORESAL   Quantity:  90 tablet   Refills:  3   Comments:  PATIENT SAID HE IS TAKING 1/2 TABLET AT NOON ANT 1 TABLET QPM    Instructions:  Take 1 tablet (10 mg total) by mouth every evening.     Begin Date    AM    Noon    PM    Bedtime       * baclofen 10 MG tablet   Commonly known as:  LIORESAL   Quantity:  45 tablet   Refills:  3    Instructions:  Take 1/2 tab at noon and 1 tab at bedtime.     Begin Date    AM    Noon    PM    Bedtime       blood sugar diagnostic Strp   Commonly known as:  BLOOD GLUCOSE TEST   Quantity:  100 strip   Refills:  prn    Instructions:  Test glucose 1 x daily     Begin Date    AM    Noon    PM    Bedtime       blood-glucose meter Misc   Quantity:  1 each   Refills:  prn    Instructions:  Use as directed     Begin Date    AM    Noon    PM     Bedtime       diclofenac sodium 1 % Gel   Commonly known as:  VOLTAREN   Quantity:  1 Tube   Refills:  1   Dose:  2 g    Instructions:  Apply 2 g topically 4 (four) times daily as needed.     Begin Date    AM    Noon    PM    Bedtime       duloxetine 60 MG capsule   Commonly known as:  CYMBALTA   Quantity:  90 capsule   Refills:  3   Dose:  60 mg    Instructions:  Take 1 capsule (60 mg total) by mouth once daily.     Begin Date    AM    Noon    PM    Bedtime       * hydrocodone-acetaminophen 10-325mg  mg Tab   Commonly known as:  NORCO   Quantity:  120 tablet   Refills:  0   Dose:  1 tablet    Instructions:  Take 1 tablet by mouth 4 (four) times daily as needed.     Begin Date    AM    Noon    PM    Bedtime       * hydrocodone-acetaminophen 10-325mg  mg Tab   Commonly known as:  NORCO   Quantity:  120 tablet   Refills:  0   Dose:  1 tablet    Start Date:  3/19/2017   Instructions:  Take 1 tablet by mouth 4 (four) times daily as needed.     Begin Date    AM    Noon    PM    Bedtime       * hydrocodone-acetaminophen 10-325mg  mg Tab   Commonly known as:  NORCO   Quantity:  120 tablet   Refills:  0   Dose:  1 tablet    Start Date:  4/18/2017   Instructions:  Take 1 tablet by mouth 4 (four) times daily as needed.     Begin Date    AM    Noon    PM    Bedtime       isosorbide mononitrate 30 MG 24 hr tablet   Commonly known as:  IMDUR   Quantity:  90 tablet   Refills:  1   Dose:  30 mg    Instructions:  Take 1 tablet (30 mg total) by mouth once daily.     Begin Date    AM    Noon    PM    Bedtime       lancets Misc   Quantity:  100 each   Refills:  prn    Instructions:  As directed     Begin Date    AM    Noon    PM    Bedtime       levocetirizine 5 MG tablet   Commonly known as:  XYZAL   Quantity:  30 tablet   Refills:  11   Dose:  5 mg    Instructions:  Take 1 tablet (5 mg total) by mouth every evening.     Begin Date    AM    Noon    PM    Bedtime       metformin 500 MG 24 hr tablet   Commonly  known as:  GLUCOPHAGE-XR   Quantity:  360 tablet   Refills:  1   Comments:  PATIENT WANTS 90 DAYS SUPPLY JUST LEFT OFFICE AND SAID SHE REQUESTED THAT    Instructions:  TAKE 2 TABLETS BY MOUTH TWICE DAILY WITH MEALS     Begin Date    AM    Noon    PM    Bedtime       metoprolol tartrate 25 MG tablet   Commonly known as:  LOPRESSOR   Quantity:  180 tablet   Refills:  1   Comments:  PATIENT WANTS 90 DAYS SUPPLY JUST LEFT OFFICE AND SAID SHE REQUESTED THAT    Instructions:  TAKE ONE TABLET BY MOUTH TWICE DAILY     Begin Date    AM    Noon    PM    Bedtime       nitroGLYCERIN 0.4 MG SL tablet   Commonly known as:  NITROSTAT   Quantity:  27 tablet   Refills:  0   Dose:  0.4 mg    Instructions:  Place 1 tablet (0.4 mg total) under the tongue every 5 (five) minutes as needed for Chest pain.     Begin Date    AM    Noon    PM    Bedtime       omeprazole 20 MG capsule   Commonly known as:  PRILOSEC   Quantity:  180 capsule   Refills:  1   Comments:  PATIENT WANTS 90 DAYS SUPPLY JUST LEFT OFFICE AND SAID SHE REQUESTED THAT    Instructions:  TWO CAPSULES BY MOUTH EVERY DAY     Begin Date    AM    Noon    PM    Bedtime       oxybutynin 5 MG Tr24   Commonly known as:  DITROPAN-XL   Quantity:  30 tablet   Refills:  5   Dose:  5 mg    Instructions:  Take 1 tablet (5 mg total) by mouth once daily.     Begin Date    AM    Noon    PM    Bedtime       pregabalin 100 MG capsule   Commonly known as:  LYRICA   Quantity:  270 capsule   Refills:  1   Comments:  patirnt would like 90 days supply    Instructions:  TAKE ONE CAPSULE BY MOUTH THREE TIMES DAILY *MAY CAUSE DROWSINESS* ** NO ALCOHOL **     Begin Date    AM    Noon    PM    Bedtime       tizanidine 4 MG tablet   Commonly known as:  ZANAFLEX   Quantity:  270 tablet   Refills:  1   Comments:  PATIENT WANTS 90 DAYS SUPPLY JUST LEFT OFFICE AND SAID SHE REQUESTED THAT    Instructions:  TAKE ONE TABLET BY MOUTH THREE TIMES DAILY AS NEEDED *MAY CAUSE DROWSINESS*     Begin Date    AM     Noon    PM    Bedtime       venlafaxine 225 mg Tr24   Quantity:  90 each   Refills:  1   Comments:  PATIENT WANTS 90 DAYS SUPPLY JUST LEFT OFFICE AND SAID SHE REQUESTED THAT    Instructions:  TAKE ONE TABLET BY MOUTH DAILY     Begin Date    AM    Noon    PM    Bedtime       vitamin D 1000 units Tab   Refills:  0   Dose:  25887 mg    Instructions:  Take 10,000 mg by mouth once daily.     Begin Date    AM    Noon    PM    Bedtime       * Notice:  This list has 5 medication(s) that are the same as other medications prescribed for you. Read the directions carefully, and ask your doctor or other care provider to review them with you.               Please bring to all follow up appointments:    1. A copy of your discharge instructions.  2. All medicines you are currently taking in their original bottles.  3. Identification and insurance card.    Please arrive 15 minutes ahead of scheduled appointment time.    Please call 24 hours in advance if you must reschedule your appointment and/or time.        Your Scheduled Appointments     Feb 27, 2017  2:45 PM CST   Back & Spine Consult with Fan Dennison MD   [facility] [place]    [address]  [address]  [facility] [address]   [phone]            Mar 09, 2017 10:00 AM CST   Consult with ZAK Howe oscar - Optometry ([facility] )    [address]  [place] [address]   [phone]            Mar 16, 2017 10:00 AM CDT   Non-Fasting Lab with LAB, APPOINTMENT [place] [facility])    [address] [address]   [phone]            Mar 30, 2017  8:15 AM CDT   Non-Fasting Lab with [facility]   [facility] ([facility])    [address]  [place] [address]   [phone]            Apr 06, 2017 10:20 AM CDT   Consult with Linda Lechuga MD   [facility] 4th [facility] ([facility] )    [address]  [facility] [address] [phone]              Your Future Surgeries/Procedures     Mar 20, 2017   Surgery with Jeffry Seaman MD   [facility] ([place])    [address] [facility] [place] [address]   [phone]                Discharge Instructions     Future Orders    Activity as tolerated     Call MD for:  redness, tenderness, or signs of infection (pain, swelling, redness, odor or green/yellow discharge around incision site)     Call MD for:  severe persistent headache     Call MD for:  severe uncontrolled pain     Call MD for:  temperature >100.4     Diet general     Questions:    Total calories:      Fat restriction, if any:      Protein restriction, if any:      Na restriction, if any:      Fluid restriction:      Additional restrictions:      No dressing needed         Discharge Instructions       Home care instructions  Apply ice pack to the injection site for 20 minutes periods for the first 24 hrs for soreness/discomfort at injection site DO NOT USE HEAT FOR 24 HOURS  Keep site clean and dry for 24 hours, remove bandaid when desired  Do not drive until tomorrow  Take care when walking after a lumbar injection  Avoid strenuous activities for 2 days  Make take 2 weeks to feel the full effects   Resume home medication as prescribed today  Resume Aspirin, Plavix, or Coumadin the day after the procedure unless otherwise instructed.    SEE IMMEDIATE MEDICAL HELP FOR:  Severe increase in your usual pain or appearance of new pain  Prolonged or increasing weakness or numbness in the legs or arms  Drainage, redness, active bleeding, or increased swelling at the injection site  Temperature over 100.0 degrees F.  Headache that increases when your head is upright and decreases when you lie flat    CALL 911 OR GO DIRECTLY TO EMERGENCY DEPARTMENT FOR:  Shortness of breath, chest pain, or problems breathing      Primary Diagnosis     Your primary diagnosis was:  Cervical Nerve  "Root Disorder      Admission Information     Date & Time Provider Department CSN    2/21/2017 12:00 PM Jeffry Seaman MD [facility] [id number]      Care Providers     Provider Role Specialty Primary office phone    Jeffry Seaman MD Attending Provider Pain Medicine [phone]    Jeffry Seaman MD Surgeon  Pain Medicine [phone]      Your Vitals Were     BP Pulse Temp Resp Height Weight    157/84 79 98.1 °F (36.7 °C) (Oral) 18 5' 5.5" (1.664 m) 94.3 kg (208 lb)    SpO2 BMI             96% 34.09 kg/m2         Recent Lab Values        3/5/2014 9/3/2014 4/23/2015 1/11/2016 6/12/2016 6/28/2016 10/18/2016 2/1/2017      7:40 AM  7:50 AM 11:10 AM  8:05 AM 11:27 PM  8:56 AM 10:10 AM 11:30 AM    A1C 7.2 (H) 7.2 (H) 7.3 (H) 8.3 (H) 8.1 (H) 7.9 (H) 8.1 (H) 7.4 (H)    Comment for A1C at 10:10 AM on 10/18/2016:  According to ADA guidelines, hemoglobin A1C <7.0% represents  optimal control in non-pregnant diabetic patients.  Different  metrics may apply to specific populations.   Standards of Medical Care in Diabetes - 2016.  For the purpose of screening for the presence of diabetes:  <5.7%     Consistent with the absence of diabetes  5.7-6.4%  Consistent with increasing risk for diabetes   (prediabetes)  >or=6.5%  Consistent with diabetes  Currently no consensus exists for use of hemoglobin A1C  for diagnosis of diabetes for children.      Comment for A1C at 11:30 AM on 2/1/2017:  According to ADA guidelines, hemoglobin A1C <7.0% represents  optimal control in non-pregnant diabetic patients.  Different  metrics may apply to specific populations.   Standards of Medical Care in Diabetes - 2016.  For the purpose of screening for the presence of diabetes:  <5.7%     Consistent with the absence of diabetes  5.7-6.4%  Consistent with increasing risk for diabetes   (prediabetes)  >or=6.5%  Consistent with diabetes  Currently no consensus exists for use of hemoglobin A1C  for diagnosis of diabetes " for children.        Allergies as of 2/21/2017        Reactions    Amoxil [Amoxicillin] Itching, Rash    Latex, Natural Rubber Hives, Swelling      Advance Directives     An advance directive is a document which, in the event you are no longer able to make decisions for yourself, tells your healthcare team what kind of treatment you do or do not want to receive, or who you would like to make those decisions for you.  If you do not currently have an advance directive, [facility] encourages you to create one.  For more information call:  [phone]), [phone] ([phone]),  or log on to [email or web address].        Smoking Cessation     If you would like to quit smoking:   You may be eligible for free services if you are a [place] resident and started smoking cigarettes before September 1, 1988.  Call the Smoking Cessation Trust (SCT) toll free at (294) 606-0755 or (357) 599-5273.   Call 3-130-QUIT-NOW if you do not meet the above criteria.            Language Assistance Services     ATTENTION: Language assistance services are available, free of charge. Please call [phone].      ATENCIÓN: Si habla español, tiene a phillips disposición servicios gratuitos de asistencia lingüística. Llame al [phone].     [facility] Ý: N?u b?n nói Ti?ng Vi?t, có các d?ch v? h? tr? ngôn ng? mi?n phí dành cho b?n. G?i s? [phone].         [facility] complies with applicable Federal civil rights laws and does not discriminate on the basis of race, color, national origin, age, disability, or sex.

## 2017-02-21 NOTE — DISCHARGE SUMMARY
[facility]  Discharge Note  Short Stay    Admit Date: 2/21/2017    Discharge Date: 2/21/2017    Attending Physician: Jeffry Seaman MD     Discharge Provider: Jeffry Seaman    Diagnoses:  Active Hospital Problems    Diagnosis  POA    *Cervical radiculopathy [M54.12]  Yes      Resolved Hospital Problems    Diagnosis Date Resolved POA   No resolved problems to display.       Discharged Condition: good    Final Diagnoses: Cervical radiculopathy [M54.12]    Disposition: Home or Self Care    Hospital Course: no complications, uneventful    Outcome of Hospitalization, Treatment, Procedure, or Surgery:  Patient was admitted for outpatient procedure. The patient underwent procedure without complications and are discharged home    Follow up/Patient Instructions:  Follow up as scheduled/Patient has received instructions and follow up date    Medications:  Continue previous medications      Discharge Procedure Orders  Diet general     Activity as tolerated     Call MD for:  temperature >100.4     Call MD for:  severe uncontrolled pain     Call MD for:  redness, tenderness, or signs of infection (pain, swelling, redness, odor or green/yellow discharge around incision site)     Call MD for:  severe persistent headache     No dressing needed           Discharge Procedure Orders (must include Diet, Follow-up, Activity):    Discharge Procedure Orders (must include Diet, Follow-up, Activity)  Diet general     Activity as tolerated     Call MD for:  temperature >100.4     Call MD for:  severe uncontrolled pain     Call MD for:  redness, tenderness, or signs of infection (pain, swelling, redness, odor or green/yellow discharge around incision site)     Call MD for:  severe persistent headache     No dressing needed

## 2017-02-21 NOTE — H&P (VIEW-ONLY)
This note was completed with dictation software and grammatical errors may exist.    CC: Back pain     HPI: The patient is a 56-year-old woman with a history of hypertension, diabetes, multiple sclerosis, lumbar postlaminectomy syndrome who presents in self-referral for continued low back pain and bilateral leg pain, neck pain.  She returns in follow-up today with neck pain and back pain.  She complains of neck pain radiating to her arms and low back pain radiating throughout the entire right greater than left legs.  She states that her legs only hurt her when walking.  Her neck pain and arm pain is constant and currently her worst pain.  She complains of muscle spasms in her right greater than left feet.  She reports numbness in her hands and right toes.  She complains of right greater than left upper she may weakness and right lower extreme a weakness.  She reports intermittent bladder and bowel incontinence.    Her main complaint today is bilateral upper arm pain, slightly worse on the left side.  It seems to be throughout the biceps and triceps, she denies major shoulder pain, does have some neck pain.  She has pain when at rest, slightly worse with range of motion of the arms, has difficulty and weakness raising her arms overhead or behind her which makes the pain worse.  She denies any constant numbness but does feel weakness throughout her hands that has been occurring more throughout the last month.  She denies any bowel or bladder incontinence.  She continues to have low back pain above her fusion and below her fusion with right leg pain.  She is status post C7-T1 cervical interlaminar epidural steroid injection on 9/7/16 with 100% relief of her bilateral arm pain and mild relief of her neck pain.     Pain intervention history: She has been followed by one of our previous [facility] pain management physicians, Dr. Chapa in [place] until recently.  She had undergone fusion surgery in 1983 and again surgery  in 1993 for her lumbar spine.  She had undergone numerous lumbar spine injections in the past with good relief.  She has tried gabapentin with only minimal relief but states that Lyrica works best.  Sounds like she had a spinal cord stimulator trial in the past that did not help.  She has been taking hydrocodone up to 4 times a day for several years.  She is status post caudal epidural steroid injection on 8/6/14 with 60% relief.  She is status post caudal epidural steroid injection on 5/13/15 with 0% relief.  She is status post caudal epidural steroid injection on 10/2/15 with 0% relief.     ROS:She reports urinary frequency, joint stiffness, joint swelling, back pain, memory loss, anxiety, depression, difficulty sleeping and loss of balance.  Balance of review of systems is negative.    Medical, surgical, family and social history reviewed elsewhere in record.    Medications/Allergies: See med card    Vitals:    02/01/17 0912   BP: 120/74   Pulse: 76   Resp: 18   Temp: 98.9 °F (37.2 °C)   TempSrc: Oral   Weight: 96.6 kg (212 lb 15.4 oz)   PainSc:   6   PainLoc: Shoulder         Physical exam:  Gen: A and O x3, pleasant, well-groomed  Skin: No rashes or obvious lesions  HEENT: PERRLA  CVS: Regular rate and rhythm, normal S1 and S2, no murmurs.  Resp: Clear to auscultation bilaterally, no wheezes or rales.  Abdomen: Soft, NT/ND, normal bowel sounds present.  Musculoskeletal: Antalgic gait, slow cautious, kyphotic stance.      Neuro:  Upper extremities: 5/5 strength bilaterally  Lower extremities: 5/5 strength bilaterally, 4/5 strength right foot dorsiflexion and right hip flexion  Reflexes: Brachioradialis 2+, Bicep 2+, Tricep 0+. Patellar 0+, Achilles 0+ bilaterally.  Sensory: Intact and symmetrical to light touch and pinprick in C2-T1 dermatomes bilaterally.Intact and symmetrical to light touch and pinprick in L2-S1 dermatomes bilaterally.    Lumbar spine:  Lumbar spine: Range of motion is moderately decreased  with flexion and extension with moderate increased bilateral low back pain.    Jeff's test causes no increased pain on either side.    Supine straight leg raise is positive for back pain only.  Internal and external rotation of the hip causes no increased on either side.  Myofascial exam: Mild tenderness to palpation across the lumbar paraspinous muscles.     Cervical range of motion full with flexion and extension and lateral rotation with increased pain on extension and lateral rotation causing pain radiating down into the shoulders.    Spurling's test positive for neck pain and shoulder pain.  Myofascial exam: Mild tenderness palpation to the bilateral cervical paraspinous muscles.    Imaging:  3/11/14 MRI L-spine  At the T9-T10 level a disk protrusion appears to be present of approximately 3 mm not entirely visualized. At least mild central canal stenosis is noted. No obvious cord contact is noted.  At the T10-T11 level, disk protrusion appears to present a 4 mm. Moderate bilateral neural foraminal stenosis is noted. Mild central canal stenosis is noted. No definitive cord contact is noted.  At the T11-T12 level, a broad based disk protrusion is noted of approximately 3 to 4 mm paracentric to the right and left. Mild to moderate bilateral neural foraminal narrowing is noted. Mild central canal narrowing is noted.  At the T12-L1 level, disk protrusion appears to be present paracentric to the right of approximately 4 mm. Mild neuroforaminal narrowing is noted. Mild central canal stenosis is noted.  At the L1-L2 level, it is difficult to assess disk bulge or central canal stenosis or neural foraminal narrowing.  At the L2-L3 level, no significant disk bulge, central canal stenosis, or neural foraminal stenosis noted.  At the L3-L4 level, no significant central canal stenosis or neural foraminal stenosis is suspected. Evaluation is moderately hindered by metallic artifact.  At the L4-L5 level, no significant disk  bulge, central canal stenosis, or neural foraminal stenosis is noted. Metallic artifact moderately hinders evaluation.  At the L5-S1 level, uncovering of the uncovertebral disk is noted. Mild neural foramina narrowing is suspected bilaterally. Mild central canal stenosis is suspected.    2/24/14: Flex/ext Xray: Extensive postsurgical changes noted involving the spine with orthopedic rods along with pedicle screws at the L2 L4 and S1 levels. A mild levocurvature is noted centered at the L1-L2 level  One of the screws presumably a right sided screw at the S1 level appears to have fractured.   Disk prosthesis with partial osseous fusion is suspected at the L2-L3 L3-L4 L4-L5 levels. Intervertebral disk height loss is noted at the L5-S1 level.   An anterior listhesis is noted at the L5-S1 level a 1 cm. this appears stable on flexion and extension  Intervertebral disk height loss is noted at the L1-L2 and T12-L1 levels as well as at the T11-T12 level.    3/11/14 MRI C-spine  C2/C3: Minimal left-sided uncovertebral and is neural foraminal narrowing is noted. The central canal is intact.  C3/C4: Mild annular disk bulge and mild uncovertebral induced neural foraminal narrowing is noted bilaterally. The central canal is intact.  C4/C5: There is annular disk bulging and there is mild to moderate uncovertebral and facet induced neural foraminal narrowing bilaterally.  C5/C6: There is a mild central and right paracentral broad-based disk protrusion and there is prominent degenerative facet disease. This and the anterior subluxation produce mild canal stenosis. The central canal is narrowed to 9 mm. There is moderate uncovertebral and facet induced neural foraminal narrowing bilaterally.  C6/C7: There is mild disk bulging and mild uncovertebral induced neural foraminal narrowing is noted. The cord does not appear contacted.  C7/T1: There is a small right paracentral disk extrusion causing mild distortion of the central canal. The  foramina are intact.    MRI lumbar spine with and without contrast 12/4/15  T11-12:There is chronic disk degeneration with disk dehydration, disk narrowing, vertebral endplate osteophytes, and degenerative vertebral endplate marrow change. There is a diffuse 2 mm posterior disk bulge causing mild dural compression and moderate bilateral foraminal stenosis. There has been no change.  T12-L1:There is chronic disk degeneration with disk dehydration, disk narrowing, vertebral end plate osteophytes, and degenerative T1 plate marrow change. There is a diffuse 2-mm posterior disk bulge causing mild thecal sac compression and moderate bilateral foraminal stenosis. There has been no change.  L1-2:There is a broad-based 3.5-mm posterior disk extrusion causing moderate thecal sac compression and severe bilateral foraminal stenosis. There has been no change.  L2-3:There is intervertebral body fusion which is well united and well aligned. There is no compromise of the spinal canal or foramina and there has been no change.  L3-4:There is intervertebral body fusion which is well united and well aligned. There is no compromise of the spinal canal or foramina and there has been no change.  L4-5:There is intervertebral body fusion which is well united and well aligned. There is no compromise of the spinal canal or foramina and there has been no change.  L5-S1:There is chronic disk degeneration with disk dehydration and narrowing. There is chronic 5-mm anterior L5 subluxation causing severe left and moderate right foraminal stenosis. There has been no change.    MRI lumbar spine 2/1/17  There are postoperative changes of posterior instrumented fusion at L2-S1.  There is a grade I anterolisthesis of L5 on S1.  The lumbar vertebral bodies show normal height without evidence of acute compression fracture or pathologic marrow replacement process.  There is a levoconvex curvature of the lumbar spine. There is degenerative desiccation, disc  space narrowing, and degenerative endplate change at T11-T12, T12-L1, L1-L2, and L5-S1.  There is near complete fusion of the L2, L3, L4, and L5 vertebral bodies.  There are probable postoperative changes of posterior decompression at L2-S1. The conus medullaris terminates at L1. The visualized retroperitoneal/abdominal soft tissue  structures are unremarkable.  T11-T12: There is a broad disc bulge which flattens the ventral aspect of the conus.  There is ligamentum flavum thickening and facet arthropathy.  There is moderate bilateral neuroforaminal stenosis present.  There is mild-moderate central canal stenosis.  T12-L1: There is a broad disc bulge with a superimposed ascending right paracentral disc extrusion which extends approximately 10 mm above the disc plane.  There is mild-moderate central canal stenosis present.  There is mild right neuroforaminal stenosis.  L1-L2: There is a broad disc bulge throughout the period there is ligamentum flavum thickening.  The central canal is obscured.  There is a suggestion of a possible right paracentral disc protrusion.  There is mild-moderate overall central canal stenosis.  The neural foramina are not assessed secondary to metal artifact.  L2-L3: No central canal or neuroforaminal stenosis. No disc protrusion or extrusion.  L3-L4: No central canal or neuroforaminal stenosis. No disc protrusion or extrusion.  L4-L5: The left neural foramen is not optimally evaluated due to metal artifact.  No central canal stenosis or right neuroforaminal stenosis.  L5-S1: There is a grade I anterolisthesis of L5 on S1 with associated uncovering of the intervertebral disc.  No definite central canal stenosis or neuroforaminal stenosis.      Assessment:  The patient is a 56-year-old woman with a history of hypertension, diabetes, multiple sclerosis, lumbar postlaminectomy syndrome who presents in self-referral for continued low back pain and bilateral leg pain, neck pain.   1. Cervical  radiculopathy  Vital signs    Activity as tolerated    Place 18-22 gauage peripheral IV     Verify informed consent    Notify physician     Notify physician     Notify physician (specify)    Diet NPO    Case Request Operating Room: INJECTION-STEROID-EPIDURAL-CERVICAL    Place in Outpatient    lactated ringers infusion    midazolam (PF) 5 mg/mL injection 4 mg   2. Lumbar radiculopathy  Place in Outpatient    Vital signs    Activity as tolerated    Verify informed consent    Notify physician     Notify physician     Notify physician (specify)    Diet NPO    alprazolam ODT dissolvable tablet 1 mg    Case Request Operating Room: ROBIN-TRANSFORAMINAL L2   3. Multiple sclerosis     4. Controlled type 2 diabetes mellitus without complication, without long-term current use of insulin  HEMOGLOBIN A1C   5. Postlaminectomy syndrome of lumbar region                                                                                                                                                                                                 Plan:  1.  I reviewed the patient's lumbar spine MRI with her and we discussed that she has stenosis above her fusion at L1/2.  Currently her neck is bothering her more than her back.  I'll schedule her for a cervical ROBIN followed by bilateral L1 TF ROBIN's 4 weeks later.  She is going to see neurosurgery to discuss if she is a surgical candidate for her back.  I have ordered a hemoglobin A1c to check her blood glucose prior to giving her steroids.  2.  Dr. Seaman provided prescriptions for hydrocodone-acetaminophen 10/325 mg up to 4 times a day as needed for pain.  3.  Follow-up in 4 weeks after the lumbar ROBIN's or sooner as needed.    Greater than 50% of this 25 minute visit was spent on counseling the patient.

## 2017-02-21 NOTE — OP NOTE
PROCEDURE DATE: 2/21/2017    Procedure: C7-T1 cervical interlaminar epidural steroid injection under utilizing fluoroscopy.    Diagnosis: Cervical Radiculopathy    POSTOP DIAGNOSIS: SAME    Physician: Jeffry Seaman MD    Medications injected:  Methylprednisone 80mg followed by a slow injection of 4 mL sterile, preservative-free normal saline.    Local anesthetic used: Lidocaine 1%, 4 ml.    Sedation Medications: 2mg versed    Complications:  none    Estimated blood loss: none    Technique:  A time-out was taken to identify patient and procedure prior to starting the procedure.  With the patient laying in a prone position with the neck in a mid-flexed forward position, the area was prepped and draped in the usual sterile fashion using ChloraPrep and a fenestrated drape.  The area was determined under AP fluoroscopic guidance.  Local anesthetic was given using a 25-gauge 1.5 inch needle by raising a wheal and then infiltrating ventrally.  A 3.5 inch 20-gauge Touhy needle was introduced under fluoroscopic guidance to meet the lamina of C7.  The needle was then hinged under the lamina then advanced using loss of resistance technique.  Once the tip of the needle was in the desired position, the contrast dye Omnipaque was injected to determine placement and no uptake.  The steroid was then injected slowly followed by a slow injection of 4 mL of the sterile preservative-free normal saline.  The patient tolerated the procedure well.    The patient was monitored after the procedure and was given post-procedure and discharge instructions to follow at home. The patient was discharged in a stable condition.

## 2017-02-21 NOTE — INTERVAL H&P NOTE
The patient has been examined and the H&P has been reviewed:    I concur with the findings and no changes have occurred since H&P was written.    Anesthesia/Surgery risks, benefits and alternative options discussed and understood by patient/family.          Active Hospital Problems    Diagnosis  POA    Controlled type 2 diabetes mellitus without complication, without long-term current use of insulin [E11.9]  Yes      Resolved Hospital Problems    Diagnosis Date Resolved POA   No resolved problems to display.

## 2017-03-01 ENCOUNTER — PATIENT MESSAGE (OUTPATIENT)
Dept: NEUROLOGY | Facility: CLINIC | Age: 57
End: 2017-03-01

## 2017-03-01 NOTE — TELEPHONE ENCOUNTER
Call placed to pt. She states she is not having any new symptoms or anything. However, there is something she needs to speak with Adrianne about. States she doesn't feel comfortable discussing it with anyone but Adrianne.

## 2017-03-02 ENCOUNTER — TELEPHONE (OUTPATIENT)
Dept: NEUROLOGY | Facility: CLINIC | Age: 57
End: 2017-03-02

## 2017-03-09 ENCOUNTER — INITIAL CONSULT (OUTPATIENT)
Dept: OPTOMETRY | Facility: CLINIC | Age: 57
End: 2017-03-09
Payer: MEDICARE

## 2017-03-09 DIAGNOSIS — H25.13 NS (NUCLEAR SCLEROSIS), BILATERAL: ICD-10-CM

## 2017-03-09 DIAGNOSIS — H47.392 OPTIC DISC HEMORRHAGE, LEFT: Primary | ICD-10-CM

## 2017-03-09 DIAGNOSIS — H52.03 HYPEROPIA, BILATERAL: ICD-10-CM

## 2017-03-09 DIAGNOSIS — G35 MULTIPLE SCLEROSIS: ICD-10-CM

## 2017-03-09 DIAGNOSIS — H52.4 PRESBYOPIA: ICD-10-CM

## 2017-03-09 DIAGNOSIS — E11.9 TYPE 2 DIABETES MELLITUS WITHOUT OPHTHALMIC MANIFESTATIONS: ICD-10-CM

## 2017-03-09 DIAGNOSIS — I10 BENIGN ESSENTIAL HTN: ICD-10-CM

## 2017-03-09 DIAGNOSIS — E11.9 CONTROLLED TYPE 2 DIABETES MELLITUS WITHOUT COMPLICATION, WITHOUT LONG-TERM CURRENT USE OF INSULIN: ICD-10-CM

## 2017-03-09 PROCEDURE — 92014 COMPRE OPH EXAM EST PT 1/>: CPT | Mod: S$GLB,,, | Performed by: OPTOMETRIST

## 2017-03-09 PROCEDURE — 92250 FUNDUS PHOTOGRAPHY W/I&R: CPT | Mod: S$GLB,,, | Performed by: OPTOMETRIST

## 2017-03-09 PROCEDURE — 99999 PR PBB SHADOW E&M-EST. PATIENT-LVL II: CPT | Mod: PBBFAC,,, | Performed by: OPTOMETRIST

## 2017-03-09 PROCEDURE — 92015 DETERMINE REFRACTIVE STATE: CPT | Mod: S$GLB,,, | Performed by: OPTOMETRIST

## 2017-03-09 NOTE — LETTER
March 9, 2017      Adrianne Talamantes, SHYAM, CNS  [address]  [facility] [address]           [facility]  [address]  [facility] [address]  Phone: [phone]  Fax: [phone]          Patient: Justin Barnes   MR Number: [id number]   YOB: 1960   Date of Visit: 3/9/2017       Dear Adrianne Talamantes:    Thank you for referring Justin Barnes to me for evaluation. Attached you will find relevant portions of my assessment and plan of care.    If you have questions, please do not hesitate to call me. I look forward to following Justin Barnes along with you.    Sincerely,    Brittany Farrell, OD    Enclosure  CC:  No Recipients    If you would like to receive this communication electronically, please contact [email or web address] or [phone] to request more information on [facility] access.    For providers and/or their staff who would like to refer a patient to [facility], please contact us through our one-stop-shop provider referral line, [facility], at [phone].    If you feel you have received this communication in error or would no longer like to receive these types of communications, please e-mail [email or web address]

## 2017-03-09 NOTE — PROGRESS NOTES
HPI     Patient's age: 57 y.o.    Approximate date of last eye examination:  4/14/14  Name of last eye doctor seen: Dr. Ho    Pt states that she misplaced glasses 3 mos ago, and vision not so clear,   blurry , saw neurologist suggested to go an get eye check    Wears glasses? Wearing readers now      Wears CLs?:  no             Headaches?  no  Eye pain/discomfort?  no                                                                                     Flashes?  no  Floaters?  yes  Diplopia/Double vision?  no    Patient's Ocular History:         Any eye surgeries? no         Family history of eye disease?  no    Significant patient medical history:         1. Diabetes?  yes       If yes, IDDM or NIDDM? NIDDM   2. HBP?  Yes, medication and diet control                   ! OTC eyedrops currently using:  none   ! Prescription eye meds currently using:  None    Hemoglobin A1C       Date                     Value               Ref Range             Status                02/01/2017               7.4 (H)             4.5 - 6.2 %           Final                    10/18/2016               8.1 (H)             4.5 - 6.2 %           Final                     06/28/2016               7.9 (H)             4.5 - 6.2 %           Final            ----------              Last edited by Joyce Collins MA on 3/9/2017  9:11 AM.         Assessment /Plan     For exam results, see Encounter Report.    Optic disc hemorrhage, left  -     Color Fundus Photography - OU - Both Eyes   Monitor return for DFE 6 months    Multiple sclerosis   No optic neuritis   Educated pt on signs and symptoms   Return if experience changes    Controlled type 2 diabetes mellitus without complication, without long-term current use of insulin  Type 2 diabetes mellitus without ophthalmic manifestations  Benign essential HTN   No retinopathy, monitor yearly    NS (nuclear sclerosis), bilateral   Mild, monitor  Hyperopia, bilateral  Presbyopia   Rx specs

## 2017-03-15 ENCOUNTER — PATIENT MESSAGE (OUTPATIENT)
Dept: NEUROLOGY | Facility: CLINIC | Age: 57
End: 2017-03-15

## 2017-03-15 ENCOUNTER — PATIENT MESSAGE (OUTPATIENT)
Dept: SPINE | Facility: CLINIC | Age: 57
End: 2017-03-15

## 2017-03-16 ENCOUNTER — LAB VISIT (OUTPATIENT)
Dept: LAB | Facility: HOSPITAL | Age: 57
End: 2017-03-16
Payer: MEDICARE

## 2017-03-16 DIAGNOSIS — G35 MULTIPLE SCLEROSIS: ICD-10-CM

## 2017-03-16 LAB
ALBUMIN SERPL BCP-MCNC: 3.6 G/DL
ALP SERPL-CCNC: 138 U/L
ALT SERPL W/O P-5'-P-CCNC: 20 U/L
AST SERPL-CCNC: 15 U/L
BASOPHILS # BLD AUTO: 0.03 K/UL
BASOPHILS NFR BLD: 0.3 %
BILIRUB DIRECT SERPL-MCNC: 0.2 MG/DL
BILIRUB SERPL-MCNC: 0.4 MG/DL
DIFFERENTIAL METHOD: NORMAL
EOSINOPHIL # BLD AUTO: 0.4 K/UL
EOSINOPHIL NFR BLD: 4.8 %
ERYTHROCYTE [DISTWIDTH] IN BLOOD BY AUTOMATED COUNT: 14.5 %
HCT VFR BLD AUTO: 39.2 %
HGB BLD-MCNC: 13.3 G/DL
LYMPHOCYTES # BLD AUTO: 2.9 K/UL
LYMPHOCYTES NFR BLD: 32.3 %
MCH RBC QN AUTO: 29.6 PG
MCHC RBC AUTO-ENTMCNC: 33.9 %
MCV RBC AUTO: 87 FL
MONOCYTES # BLD AUTO: 0.5 K/UL
MONOCYTES NFR BLD: 5.6 %
NEUTROPHILS # BLD AUTO: 5.1 K/UL
NEUTROPHILS NFR BLD: 56.8 %
PLATELET # BLD AUTO: 225 K/UL
PMV BLD AUTO: 9.7 FL
PROT SERPL-MCNC: 6.9 G/DL
RBC # BLD AUTO: 4.5 M/UL
WBC # BLD AUTO: 9.04 K/UL

## 2017-03-16 PROCEDURE — 85025 COMPLETE CBC W/AUTO DIFF WBC: CPT

## 2017-03-16 PROCEDURE — 80076 HEPATIC FUNCTION PANEL: CPT

## 2017-03-16 PROCEDURE — 36415 COLL VENOUS BLD VENIPUNCTURE: CPT

## 2017-03-20 ENCOUNTER — HOSPITAL ENCOUNTER (OUTPATIENT)
Facility: HOSPITAL | Age: 57
Discharge: HOME | End: 2017-03-20
Attending: ANESTHESIOLOGY | Admitting: ANESTHESIOLOGY
Payer: MEDICARE

## 2017-03-20 ENCOUNTER — HOSPITAL ENCOUNTER (OUTPATIENT)
Dept: RADIOLOGY | Facility: HOSPITAL | Age: 57
Discharge: HOME | End: 2017-03-20
Attending: ANESTHESIOLOGY
Payer: MEDICARE

## 2017-03-20 ENCOUNTER — SURGERY (OUTPATIENT)
Age: 57
End: 2017-03-20

## 2017-03-20 DIAGNOSIS — E11.9 CONTROLLED TYPE 2 DIABETES MELLITUS WITHOUT COMPLICATION, WITHOUT LONG-TERM CURRENT USE OF INSULIN: ICD-10-CM

## 2017-03-20 DIAGNOSIS — M54.16 LUMBAR RADICULOPATHY: Primary | ICD-10-CM

## 2017-03-20 DIAGNOSIS — M51.36 DDD (DEGENERATIVE DISC DISEASE), LUMBAR: ICD-10-CM

## 2017-03-20 LAB — GLUCOSE SERPL-MCNC: 78 MG/DL (ref 70–110)

## 2017-03-20 PROCEDURE — 25000003 PHARM REV CODE 250: Mod: PO | Performed by: ANESTHESIOLOGY

## 2017-03-20 PROCEDURE — 25500020 PHARM REV CODE 255: Mod: PO | Performed by: ANESTHESIOLOGY

## 2017-03-20 PROCEDURE — 64483 NJX AA&/STRD TFRM EPI L/S 1: CPT | Mod: 50,PO | Performed by: ANESTHESIOLOGY

## 2017-03-20 PROCEDURE — 64483 NJX AA&/STRD TFRM EPI L/S 1: CPT | Mod: 50,,, | Performed by: ANESTHESIOLOGY

## 2017-03-20 PROCEDURE — 63600175 PHARM REV CODE 636 W HCPCS: Mod: PO | Performed by: ANESTHESIOLOGY

## 2017-03-20 RX ORDER — LIDOCAINE HYDROCHLORIDE 10 MG/ML
INJECTION INFILTRATION; PERINEURAL
Status: DISCONTINUED | OUTPATIENT
Start: 2017-03-20 | End: 2017-03-20 | Stop reason: HOSPADM

## 2017-03-20 RX ORDER — ALPRAZOLAM 0.5 MG/1
1 TABLET, ORALLY DISINTEGRATING ORAL ONCE AS NEEDED
Status: COMPLETED | OUTPATIENT
Start: 2017-03-20 | End: 2017-03-20

## 2017-03-20 RX ORDER — BUPIVACAINE HYDROCHLORIDE 2.5 MG/ML
INJECTION, SOLUTION EPIDURAL; INFILTRATION; INTRACAUDAL
Status: DISCONTINUED | OUTPATIENT
Start: 2017-03-20 | End: 2017-03-20 | Stop reason: HOSPADM

## 2017-03-20 RX ORDER — METHYLPREDNISOLONE ACETATE 80 MG/ML
INJECTION, SUSPENSION INTRA-ARTICULAR; INTRALESIONAL; INTRAMUSCULAR; SOFT TISSUE
Status: DISCONTINUED | OUTPATIENT
Start: 2017-03-20 | End: 2017-03-20 | Stop reason: HOSPADM

## 2017-03-20 RX ADMIN — METHYLPREDNISOLONE ACETATE 80 MG: 80 INJECTION, SUSPENSION INTRA-ARTICULAR; INTRALESIONAL; INTRAMUSCULAR; SOFT TISSUE at 01:03

## 2017-03-20 RX ADMIN — BUPIVACAINE HYDROCHLORIDE 3 ML: 2.5 INJECTION, SOLUTION EPIDURAL; INFILTRATION; INTRACAUDAL; PERINEURAL at 01:03

## 2017-03-20 RX ADMIN — LIDOCAINE HYDROCHLORIDE 10 ML: 10 INJECTION, SOLUTION INFILTRATION; PERINEURAL at 01:03

## 2017-03-20 RX ADMIN — IOHEXOL 3 ML: 300 INJECTION, SOLUTION INTRAVENOUS at 01:03

## 2017-03-20 RX ADMIN — ALPRAZOLAM 1 MG: 0.5 TABLET, ORALLY DISINTEGRATING ORAL at 12:03

## 2017-03-20 NOTE — OP NOTE
PROCEDURE DATE: 3/20/2017    PROCEDURE: Bilateral L1 transforaminal epidural steroid injection under fluoroscopy    DIAGNOSIS: Lumbar  Radiculopathy    Post op diagnosis: Same    PHYSICIAN: Jeffry Seaman MD    MEDICATIONS INJECTED:  Methylprednisolone 40mg (0.5ml) and 1.5ml 0.25% bupivicaine at each nerve root.     LOCAL ANESTHETIC INJECTED:  Lidocaine 1%. 4 ml per site.    SEDATION MEDICATIONS: none    ESTIMATED BLOOD LOSS:  none    COMPLICATIONS:  none    TECHNIQUE:   A time-out was taken to identify patient and procedure side prior to starting the procedure. The patient was placed in a prone position, prepped and draped in the usual sterile fashion using ChloraPrep and sterile towels.  The area to be injected was determined under fluoroscopic guidance in AP and oblique view.  Local anesthetic was given by raising a wheal and going down to the hub of a 25-gauge 1.5 inch needle.  In oblique view, a 3.5 inch 22-gauge bent-tip spinal needle was introduced towards 6 oclock position of the pedicle of each above named nerve root level.  The needle was walked medially then hinged into the neural foramen and position was confirmed in AP and lateral views.  Omnipaque contrast dye was injected to confirm appropriate placement and that there was no vascular uptake.  After negative aspiration for blood or CSF, the medication was then injected. This was performed at the right and then left L1 level(s). The patient tolerated the procedure well.    The patient was monitored after the procedure.  Patient was given post procedure and discharge instructions to follow at home. The patient was discharged in a stable condition.

## 2017-03-20 NOTE — DISCHARGE SUMMARY
[facility]  Discharge Note  Short Stay    Admit Date: 3/20/2017    Discharge Date: 3/20/2017    Attending Physician: Jeffry Seaman MD     Discharge Provider: Jeffry Seaman    Diagnoses:  Active Hospital Problems    Diagnosis  POA    *Lumbar radiculopathy [M54.16]  Yes      Resolved Hospital Problems    Diagnosis Date Resolved POA   No resolved problems to display.       Discharged Condition: good    Final Diagnoses: Lumbar radiculopathy [M54.16]    Disposition: Home or Self Care    Hospital Course: no complications, uneventful    Outcome of Hospitalization, Treatment, Procedure, or Surgery:  Patient was admitted for outpatient procedure. The patient underwent procedure without complications and are discharged home    Follow up/Patient Instructions:  Follow up as scheduled/Patient has received instructions and follow up date    Medications:  Continue previous medications      Discharge Procedure Orders  Diet general     Activity as tolerated     Call MD for:  temperature >100.4     Call MD for:  severe uncontrolled pain     Call MD for:  redness, tenderness, or signs of infection (pain, swelling, redness, odor or green/yellow discharge around incision site)     Call MD for:  severe persistent headache     No dressing needed           Discharge Procedure Orders (must include Diet, Follow-up, Activity):    Discharge Procedure Orders (must include Diet, Follow-up, Activity)  Diet general     Activity as tolerated     Call MD for:  temperature >100.4     Call MD for:  severe uncontrolled pain     Call MD for:  redness, tenderness, or signs of infection (pain, swelling, redness, odor or green/yellow discharge around incision site)     Call MD for:  severe persistent headache     No dressing needed

## 2017-03-20 NOTE — PLAN OF CARE
Awake, alert and tolerating po fluids well. No apparent distress noted. Meets the department guidelines for discharge. Instructions reviewed with patient and verbal understanding expressed. Family/friend to drive patient home.

## 2017-03-20 NOTE — IP AVS SNAPSHOT
[facility]  [address]  [place] [address]  Phone: [phone]           Patient Discharge Instructions     Our goal is to set you up for success. This packet includes information on your condition, medications, and your home care. It will help you to care for yourself so you don't get sicker and need to go back to the hospital.     Please ask your nurse if you have any questions.        There are many details to remember when preparing to leave the hospital. Here is what you will need to do:    1. Take your medicine. If you are prescribed medications, review your Medication List in the following pages. You may have new medications to  at the pharmacy and others that you'll need to stop taking. Review the instructions for how and when to take your medications. Talk with your doctor or nurses if you are unsure of what to do.     2. Go to your follow-up appointments. Specific follow-up information is listed in the following pages. Your may be contacted by a transition nurse or clinical provider about future appointments. Be sure we have all of the phone numbers to reach you, if needed. Please contact your provider's office if you are unable to make an appointment.     3. Watch for warning signs. Your doctor or nurse will give you detailed warning signs to watch for and when to call for assistance. These instructions may also include educational information about your condition. If you experience any of warning signs to your health, call your doctor.               [facility]  Unless otherwise directed by your provider, please contact [facility], our nurse care line that is available for 24/7 assistance.     [phone] (toll-free)    Registered nurses in the [facility] provide clinical advisement, health education, appointment booking, and other advisory services.                    ** Verify the list of medication(s) below is accurate and up  to date. Carry this with you in case of emergency. If your medications have changed, please notify your healthcare provider.             Medication List      CHANGE how you take these medications        Additional Info                      * hydrocodone-acetaminophen 10-325mg  mg Tab   Commonly known as:  NORCO   Quantity:  120 tablet   Refills:  0   Dose:  1 tablet   What changed:  Another medication with the same name was removed. Continue taking this medication, and follow the directions you see here.    Instructions:  Take 1 tablet by mouth 4 (four) times daily as needed.     Begin Date    AM    Noon    PM    Bedtime       * hydrocodone-acetaminophen 10-325mg  mg Tab   Commonly known as:  NORCO   Quantity:  120 tablet   Refills:  0   Dose:  1 tablet   What changed:  Another medication with the same name was removed. Continue taking this medication, and follow the directions you see here.    Start Date:  4/18/2017   Instructions:  Take 1 tablet by mouth 4 (four) times daily as needed.     Begin Date    AM    Noon    PM    Bedtime       * Notice:  This list has 2 medication(s) that are the same as other medications prescribed for you. Read the directions carefully, and ask your doctor or other care provider to review them with you.      CONTINUE taking these medications        Additional Info                      amlodipine-benazepril 10-20mg 10-20 mg per capsule   Commonly known as:  LOTREL   Quantity:  90 capsule   Refills:  1   Comments:  PATIENT WANTS 90 DAYS SUPPLY JUST LEFT OFFICE AND SAID SHE REQUESTED THAT    Instructions:  Take 1 capsule by mouth once daily.     Begin Date    AM    Noon    PM    Bedtime       aspirin 81 MG EC tablet   Commonly known as:  ECOTRIN   Refills:  0   Dose:  81 mg    Instructions:  Take 1 tablet (81 mg total) by mouth once daily.     Begin Date    AM    Noon    PM    Bedtime       atorvastatin 80 MG tablet   Commonly known as:  LIPITOR   Quantity:  90 tablet    Refills:  3   Dose:  80 mg    Instructions:  Take 1 tablet (80 mg total) by mouth once daily.     Begin Date    AM    Noon    PM    Bedtime       AUBAGIO 14 mg Tab   Refills:  0   Generic drug:  teriflunomide    Instructions:  Take by mouth.     Begin Date    AM    Noon    PM    Bedtime       * baclofen 10 MG tablet   Commonly known as:  LIORESAL   Quantity:  90 tablet   Refills:  3   Comments:  PATIENT SAID HE IS TAKING 1/2 TABLET AT NOON ANT 1 TABLET QPM    Instructions:  Take 1 tablet (10 mg total) by mouth every evening.     Begin Date    AM    Noon    PM    Bedtime       * baclofen 10 MG tablet   Commonly known as:  LIORESAL   Quantity:  45 tablet   Refills:  3    Instructions:  Take 1/2 tab at noon and 1 tab at bedtime.     Begin Date    AM    Noon    PM    Bedtime       blood sugar diagnostic Strp   Commonly known as:  BLOOD GLUCOSE TEST   Quantity:  100 strip   Refills:  prn    Instructions:  Test glucose 1 x daily     Begin Date    AM    Noon    PM    Bedtime       blood-glucose meter Misc   Quantity:  1 each   Refills:  prn    Instructions:  Use as directed     Begin Date    AM    Noon    PM    Bedtime       diclofenac sodium 1 % Gel   Commonly known as:  VOLTAREN   Quantity:  1 Tube   Refills:  1   Dose:  2 g    Instructions:  Apply 2 g topically 4 (four) times daily as needed.     Begin Date    AM    Noon    PM    Bedtime       duloxetine 60 MG capsule   Commonly known as:  CYMBALTA   Quantity:  90 capsule   Refills:  3   Dose:  60 mg    Instructions:  Take 1 capsule (60 mg total) by mouth once daily.     Begin Date    AM    Noon    PM    Bedtime       isosorbide mononitrate 30 MG 24 hr tablet   Commonly known as:  IMDUR   Quantity:  90 tablet   Refills:  1   Dose:  30 mg    Instructions:  Take 1 tablet (30 mg total) by mouth once daily.     Begin Date    AM    Noon    PM    Bedtime       lancets Misc   Quantity:  100 each   Refills:  prn    Instructions:  As directed     Begin Date    AM    Noon    PM     Bedtime       levocetirizine 5 MG tablet   Commonly known as:  XYZAL   Quantity:  30 tablet   Refills:  11   Dose:  5 mg    Instructions:  Take 1 tablet (5 mg total) by mouth every evening.     Begin Date    AM    Noon    PM    Bedtime       metformin 500 MG 24 hr tablet   Commonly known as:  GLUCOPHAGE-XR   Quantity:  360 tablet   Refills:  1   Comments:  PATIENT WANTS 90 DAYS SUPPLY JUST LEFT OFFICE AND SAID SHE REQUESTED THAT    Instructions:  TAKE 2 TABLETS BY MOUTH TWICE DAILY WITH MEALS     Begin Date    AM    Noon    PM    Bedtime       metoprolol tartrate 25 MG tablet   Commonly known as:  LOPRESSOR   Quantity:  180 tablet   Refills:  1   Comments:  PATIENT WANTS 90 DAYS SUPPLY JUST LEFT OFFICE AND SAID SHE REQUESTED THAT    Instructions:  TAKE ONE TABLET BY MOUTH TWICE DAILY     Begin Date    AM    Noon    PM    Bedtime       nitroGLYCERIN 0.4 MG SL tablet   Commonly known as:  NITROSTAT   Quantity:  27 tablet   Refills:  0   Dose:  0.4 mg    Instructions:  Place 1 tablet (0.4 mg total) under the tongue every 5 (five) minutes as needed for Chest pain.     Begin Date    AM    Noon    PM    Bedtime       omeprazole 20 MG capsule   Commonly known as:  PRILOSEC   Quantity:  180 capsule   Refills:  1   Comments:  PATIENT WANTS 90 DAYS SUPPLY JUST LEFT OFFICE AND SAID SHE REQUESTED THAT    Instructions:  TWO CAPSULES BY MOUTH EVERY DAY     Begin Date    AM    Noon    PM    Bedtime       oxybutynin 5 MG Tr24   Commonly known as:  DITROPAN-XL   Quantity:  30 tablet   Refills:  5   Dose:  5 mg    Instructions:  Take 1 tablet (5 mg total) by mouth once daily.     Begin Date    AM    Noon    PM    Bedtime       pregabalin 100 MG capsule   Commonly known as:  LYRICA   Quantity:  270 capsule   Refills:  1   Comments:  patirnt would like 90 days supply    Instructions:  TAKE ONE CAPSULE BY MOUTH THREE TIMES DAILY *MAY CAUSE DROWSINESS* ** NO ALCOHOL **     Begin Date    AM    Noon    PM    Bedtime       tizanidine 4  MG tablet   Commonly known as:  ZANAFLEX   Quantity:  270 tablet   Refills:  1   Comments:  PATIENT WANTS 90 DAYS SUPPLY JUST LEFT OFFICE AND SAID SHE REQUESTED THAT    Instructions:  TAKE ONE TABLET BY MOUTH THREE TIMES DAILY AS NEEDED *MAY CAUSE DROWSINESS*     Begin Date    AM    Noon    PM    Bedtime       venlafaxine 225 mg Tr24   Quantity:  90 each   Refills:  1   Comments:  PATIENT WANTS 90 DAYS SUPPLY JUST LEFT OFFICE AND SAID SHE REQUESTED THAT    Instructions:  TAKE ONE TABLET BY MOUTH DAILY     Begin Date    AM    Noon    PM    Bedtime       vitamin D 1000 units Tab   Refills:  0   Dose:  28073 mg    Instructions:  Take 10,000 mg by mouth once daily.     Begin Date    AM    Noon    PM    Bedtime       * Notice:  This list has 2 medication(s) that are the same as other medications prescribed for you. Read the directions carefully, and ask your doctor or other care provider to review them with you.               Please bring to all follow up appointments:    1. A copy of your discharge instructions.  2. All medicines you are currently taking in their original bottles.  3. Identification and insurance card.    Please arrive 15 minutes ahead of scheduled appointment time.    Please call 24 hours in advance if you must reschedule your appointment and/or time.        Your Scheduled Appointments     Mar 30, 2017  9:30 AM CDT   Non-Fasting Lab with LAB, APPOINT[place] [facility] ([facility])    [address] [id number] [phone]            Apr 06, 2017  8:30 AM CDT   CONSULT - SPINE VISIT with Fan Dennison MD   [facility] - Neurosurgery 7th Fl ([facility] )    [address]  [place] [address] [phone]            Apr 06, 2017 10:20 AM CDT   Consult with Linda Lechuga MD   [facility] - Urology 4th Floor ([facility] )    [address]  [place] [id number] [phone]            Apr 11, 2017 10:30 AM CDT    Established Patient Visit with JOSE ALEJANDRO [facility] - Pain Management ([place])    [address]  [facility] [address]   [phone]            Apr 27, 2017  9:30 AM CDT   Non-Fasting Lab with LAB, APPOINTMENT [place] [facility] ([facility])    [address] [id number] [phone]                Discharge Instructions     Future Orders    Activity as tolerated     Call MD for:  redness, tenderness, or signs of infection (pain, swelling, redness, odor or green/yellow discharge around incision site)     Call MD for:  severe persistent headache     Call MD for:  severe uncontrolled pain     Call MD for:  temperature >100.4     Diet general     Questions:    Total calories:      Fat restriction, if any:      Protein restriction, if any:      Na restriction, if any:      Fluid restriction:      Additional restrictions:      No dressing needed         Discharge Instructions       Home care instructions  Apply ice pack to the injection site for 20 minutes periods for the first 24 hrs for soreness/discomfort at injection site DO NOT USE HEAT FOR 24 HOURS  Keep site clean and dry for 24 hours, remove bandaid when desired  Do not drive until tomorrow  Take care when walking after a lumbar injection  Avoid strenuous activities for 2 days  Make take 2 weeks to feel the full effects   Resume home medication as prescribed today  Resume Aspirin, Plavix, or Coumadin the day after the procedure unless otherwise instructed.    SEE IMMEDIATE MEDICAL HELP FOR:  Severe increase in your usual pain or appearance of new pain  Prolonged or increasing weakness or numbness in the legs or arms  Drainage, redness, active bleeding, or increased swelling at the injection site  Temperature over 100.0 degrees F.  Headache that increases when your head is upright and decreases when you lie flat    CALL 911 OR GO DIRECTLY TO EMERGENCY DEPARTMENT  "FOR:  Shortness of breath, chest pain, or problems breathing      Primary Diagnosis     Your primary diagnosis was:  Not on File      Admission Information     Date & Time Provider Department CSN    3/20/2017 11:59 AM Jeffry Seaman MD [facility] [phone]      Care Providers     Provider Role Specialty Primary office phone    Jeffry Seaman MD Attending Provider Pain Medicine [phone]    Jeffry Seaman MD Surgeon  Pain Medicine [phone]      Your Vitals Were     BP Pulse Temp Resp Height Weight    149/73 79 97.8 °F (36.6 °C) (Temporal) 20 5' 5.5" (1.664 m) 94.3 kg (208 lb)    SpO2 BMI             98% 34.09 kg/m2         Recent Lab Values        3/5/2014 9/3/2014 4/23/2015 1/11/2016 6/12/2016 6/28/2016 10/18/2016 2/1/2017      7:40 AM  7:50 AM 11:10 AM  8:05 AM 11:27 PM  8:56 AM 10:10 AM 11:30 AM    A1C 7.2 (H) 7.2 (H) 7.3 (H) 8.3 (H) 8.1 (H) 7.9 (H) 8.1 (H) 7.4 (H)    Comment for A1C at 10:10 AM on 10/18/2016:  According to ADA guidelines, hemoglobin A1C <7.0% represents  optimal control in non-pregnant diabetic patients.  Different  metrics may apply to specific populations.   Standards of Medical Care in Diabetes - 2016.  For the purpose of screening for the presence of diabetes:  <5.7%     Consistent with the absence of diabetes  5.7-6.4%  Consistent with increasing risk for diabetes   (prediabetes)  >or=6.5%  Consistent with diabetes  Currently no consensus exists for use of hemoglobin A1C  for diagnosis of diabetes for children.      Comment for A1C at 11:30 AM on 2/1/2017:  According to ADA guidelines, hemoglobin A1C <7.0% represents  optimal control in non-pregnant diabetic patients.  Different  metrics may apply to specific populations.   Standards of Medical Care in Diabetes - 2016.  For the purpose of screening for the presence of diabetes:  <5.7%     Consistent with the absence of diabetes  5.7-6.4%  Consistent with increasing risk for diabetes "   (prediabetes)  >or=6.5%  Consistent with diabetes  Currently no consensus exists for use of hemoglobin A1C  for diagnosis of diabetes for children.        Allergies as of 3/20/2017        Reactions    Amoxil [Amoxicillin] Itching, Rash    Latex, Natural Rubber Hives, Swelling      Advance Directives     An advance directive is a document which, in the event you are no longer able to make decisions for yourself, tells your healthcare team what kind of treatment you do or do not want to receive, or who you would like to make those decisions for you.  If you do not currently have an advance directive, [facility] encourages you to create one.  For more information call:  [phone]), [phone] ([phone]),  or log on to [email or web address].        Smoking Cessation     If you would like to quit smoking:   You may be eligible for free services if you are a [place] resident and started smoking cigarettes before September 1, 1988.  Call the Smoking Cessation Trust ([facility]) toll free at (453) 177-1965 or ([phone] 128-2829.   Call 2-187-QUIT-NOW if you do not meet the above criteria.            Language Assistance Services     ATTENTION: Language assistance services are available, free of charge. Please call [phone].      ATENCIÓN: Si habla español, tiene a phillips disposición servicios gratuitos de asistencia lingüística. Llame al [phone].     CHÚ Ý: N?u b?n nói Ti?ng Vi?t, có các d?ch v? h? tr? ngôn ng? mi?n phí dành cho b?n. G?i s? [phone].         [facility] complies with applicable Federal civil rights laws and does not discriminate on the basis of race, color, national origin, age, disability, or sex.

## 2017-03-20 NOTE — H&P
CC: Back pain    HPI: The patient is a 56yo woman with a history of lumbar radiculopathy here for bilateral L1 TFESI. There are no major changes in history and physical from 2/1/17.    Past Medical History:   Diagnosis Date    Anxiety 3/17/2014    Cataract     Cervical cancer 1996    Combined hyperlipidemia associated with type 2 diabetes mellitus     Coronary artery disease, non-occlusive     DDD (degenerative disc disease), cervical 2/24/2014    DDD (degenerative disc disease), lumbar 2/24/2014    Diabetes mellitus, type 2     Hand arthritis     bilateral    Hypertension associated with diabetes     Lumbar postlaminectomy syndrome 2/24/2014    Multiple sclerosis 2/24/2014       Past Surgical History:   Procedure Laterality Date    BACK SURGERY      x2, both lumbar    CARDIAC CATHETERIZATION      CARPAL TUNNEL RELEASE Left     COLONOSCOPY      COLONOSCOPY N/A 7/26/2016    Procedure: COLONOSCOPY;  Surgeon: Jefe Sow MD;  [facility];  Service: Endoscopy;  Laterality: N/A;    CYST REMOVAL      skin of back    Epidural Steroid injection      HYSTERECTOMY      with one ovary removed    OOPHORECTOMY      2nd ovary removed 1 year after Hysterectomy    OTHER SURGICAL HISTORY      cervical epidural injection    TUBAL LIGATION         Family History   Problem Relation Age of Onset    Breast cancer Neg Hx     Ovarian cancer Neg Hx        Social History     Social History    Marital status:      Spouse name: N/A    Number of children: N/A    Years of education: N/A     Social History Main Topics    Smoking status: Current Every Day Smoker     Packs/day: 0.50     Types: Cigarettes     Start date: 5/7/1973    Smokeless tobacco: Never Used    Alcohol use No    Drug use: Yes     Special: Hydrocodone    Sexual activity: No     Other Topics Concern    None     Social History Narrative    Was not raised by family, does not know history       No current facility-administered  "medications for this encounter.        Review of patient's allergies indicates:   Allergen Reactions    Amoxil [amoxicillin] Itching and Rash    Latex, natural rubber Hives and Swelling       Vitals:    03/17/17 0925 03/20/17 1237   BP:  122/76   Pulse:  80   Resp:  16   Temp:  98.5 °F (36.9 °C)   TempSrc:  Oral   SpO2:  95%   Weight: 94.3 kg (208 lb) 94.3 kg (208 lb)   Height: 5' 5.5" (1.664 m) 5' 5.5" (1.664 m)       REVIEW OF SYSTEMS:     GENERAL: No weight loss, malaise or fevers.  HEENT:  No recent changes in vision or hearing  NECK: Negative for lumps, no difficulty with swallowing.  RESPIRATORY: Negative for cough, wheezing or shortness of breath, patient denies any recent URI.  CARDIOVASCULAR: Negative for chest pain, leg swelling or palpitations.  GI: Negative for abdominal discomfort, blood in stools or black stools or change in bowel habits.  MUSCULOSKELETAL: See HPI.  SKIN: Negative for lesions, rash, and itching.  PSYCH: No suicidal or homicidal ideations, no current mood disturbances.  HEMATOLOGY/LYMPHOLOGY: Negative for prolonged bleeding, bruising easily or swollen nodes. Patient is not currently taking any anti-coagulants  ENDO: No history of diabetes or thyroid dysfunction  NEURO: No history of syncope, paralysis, seizures or tremors.All other reviewed and negative other than HPI.    Physical exam:  Gen: A and O x3, pleasant, well-groomed  Skin: No rashes or obvious lesions  HEENT: PERRLA, no obvious deformities on ears or in canals. No thyroid masses, trachea midline, no palpable lymph nodes in neck, axilla.  CVS: Regular rate and rhythm, normal S1 and S2, no murmurs.  Resp: Clear to auscultation bilaterally.  Abdomen: Soft, NT/ND, normal bowel sounds present.  Musculoskeletal/Neuro: Moving all extremities    Assessment:  Lumbar radiculopathy  -     Case Request Operating Room: ROBIN-TRANSFORAMINAL L2  -     Activity as tolerated; Standing  -     Place in Outpatient; Standing  -     Diet NPO; " Standing  -     alprazolam ODT dissolvable tablet 1 mg; Take 2 tablets (1 mg total) by mouth once as needed for Anxiety.  -     Notify physician ; Standing  -     Notify physician ; Standing  -     Notify physician (specify); Standing  -     Verify informed consent; Standing  -     Vital signs; Standing    Controlled type 2 diabetes mellitus without complication, without long-term current use of insulin    Other orders  -     Low Risk of VTE; Standing  -     POCT glucose; Standing

## 2017-03-21 VITALS
OXYGEN SATURATION: 98 % | HEIGHT: 66 IN | BODY MASS INDEX: 33.43 KG/M2 | DIASTOLIC BLOOD PRESSURE: 83 MMHG | RESPIRATION RATE: 20 BRPM | TEMPERATURE: 98 F | WEIGHT: 208 LBS | HEART RATE: 80 BPM | SYSTOLIC BLOOD PRESSURE: 160 MMHG

## 2017-04-06 ENCOUNTER — TELEPHONE (OUTPATIENT)
Dept: NEUROSURGERY | Facility: CLINIC | Age: 57
End: 2017-04-06

## 2017-04-06 ENCOUNTER — HOSPITAL ENCOUNTER (OUTPATIENT)
Dept: RADIOLOGY | Facility: HOSPITAL | Age: 57
Discharge: HOME | End: 2017-04-06
Attending: NEUROLOGICAL SURGERY
Payer: MEDICARE

## 2017-04-06 ENCOUNTER — PATIENT MESSAGE (OUTPATIENT)
Dept: NEUROSURGERY | Facility: CLINIC | Age: 57
End: 2017-04-06

## 2017-04-06 ENCOUNTER — OFFICE VISIT (OUTPATIENT)
Dept: NEUROSURGERY | Facility: CLINIC | Age: 57
End: 2017-04-06
Attending: NEUROLOGICAL SURGERY
Payer: MEDICARE

## 2017-04-06 ENCOUNTER — OFFICE VISIT (OUTPATIENT)
Dept: UROLOGY | Facility: CLINIC | Age: 57
End: 2017-04-06
Payer: MEDICARE

## 2017-04-06 VITALS
HEART RATE: 77 BPM | WEIGHT: 203.94 LBS | DIASTOLIC BLOOD PRESSURE: 94 MMHG | SYSTOLIC BLOOD PRESSURE: 170 MMHG | BODY MASS INDEX: 32.78 KG/M2 | HEIGHT: 66 IN

## 2017-04-06 VITALS
HEIGHT: 66 IN | SYSTOLIC BLOOD PRESSURE: 166 MMHG | DIASTOLIC BLOOD PRESSURE: 88 MMHG | WEIGHT: 209.38 LBS | BODY MASS INDEX: 33.65 KG/M2 | HEART RATE: 81 BPM | TEMPERATURE: 99 F

## 2017-04-06 DIAGNOSIS — M50.30 DDD (DEGENERATIVE DISC DISEASE), CERVICAL: ICD-10-CM

## 2017-04-06 DIAGNOSIS — M48.061 SPINAL STENOSIS, LUMBAR: ICD-10-CM

## 2017-04-06 DIAGNOSIS — M48.061 SPINAL STENOSIS, LUMBAR: Primary | ICD-10-CM

## 2017-04-06 DIAGNOSIS — N81.12 LATERAL CYSTOCELE: ICD-10-CM

## 2017-04-06 DIAGNOSIS — M51.36 DDD (DEGENERATIVE DISC DISEASE), LUMBAR: ICD-10-CM

## 2017-04-06 DIAGNOSIS — R35.1 NOCTURIA: Primary | ICD-10-CM

## 2017-04-06 PROCEDURE — 72052 X-RAY EXAM NECK SPINE 6/>VWS: CPT | Mod: 26,,, | Performed by: RADIOLOGY

## 2017-04-06 PROCEDURE — 3077F SYST BP >= 140 MM HG: CPT | Mod: S$GLB,,, | Performed by: UROLOGY

## 2017-04-06 PROCEDURE — 1160F RVW MEDS BY RX/DR IN RCRD: CPT | Mod: S$GLB,,, | Performed by: NEUROLOGICAL SURGERY

## 2017-04-06 PROCEDURE — 99204 OFFICE O/P NEW MOD 45 MIN: CPT | Mod: S$GLB,,, | Performed by: NEUROLOGICAL SURGERY

## 2017-04-06 PROCEDURE — 99999 PR PBB SHADOW E&M-EST. PATIENT-LVL III: CPT | Mod: PBBFAC,,, | Performed by: NEUROLOGICAL SURGERY

## 2017-04-06 PROCEDURE — 99499 UNLISTED E&M SERVICE: CPT | Mod: S$PBB,,, | Performed by: NEUROLOGICAL SURGERY

## 2017-04-06 PROCEDURE — 3079F DIAST BP 80-89 MM HG: CPT | Mod: S$GLB,,, | Performed by: NEUROLOGICAL SURGERY

## 2017-04-06 PROCEDURE — 3078F DIAST BP <80 MM HG: CPT | Mod: S$GLB,,, | Performed by: UROLOGY

## 2017-04-06 PROCEDURE — 1160F RVW MEDS BY RX/DR IN RCRD: CPT | Mod: S$GLB,,, | Performed by: UROLOGY

## 2017-04-06 PROCEDURE — 99999 PR PBB SHADOW E&M-EST. PATIENT-LVL III: CPT | Mod: PBBFAC,,, | Performed by: UROLOGY

## 2017-04-06 PROCEDURE — 3077F SYST BP >= 140 MM HG: CPT | Mod: S$GLB,,, | Performed by: NEUROLOGICAL SURGERY

## 2017-04-06 PROCEDURE — 99205 OFFICE O/P NEW HI 60 MIN: CPT | Mod: S$GLB,,, | Performed by: UROLOGY

## 2017-04-06 PROCEDURE — 72052 X-RAY EXAM NECK SPINE 6/>VWS: CPT | Mod: TC

## 2017-04-06 RX ORDER — TROSPIUM CHLORIDE 20 MG/1
20 TABLET, FILM COATED ORAL 2 TIMES DAILY
Qty: 60 TABLET | Refills: 11 | Status: SHIPPED | OUTPATIENT
Start: 2017-04-06 | End: 2017-10-11 | Stop reason: SDUPTHER

## 2017-04-06 NOTE — LETTER
April 6, 2017      Jeffry Seaman MD  [address] [facility]  [facility] [address]           [facility] - Neurosurgery 7th Fl  [address]  [facility] [address]  Phone: [phone]          Patient: Justin Barnes   MR Number: [id number]   YOB: 1960   Date of Visit: 4/6/2017       Dear Dr. Jeffry Seaman:    Thank you for referring Justin Barnes to me for evaluation. Attached you will find relevant portions of my assessment and plan of care.    If you have questions, please do not hesitate to call me. I look forward to following Justin Barnes along with you.    Sincerely,    Fan Dennison MD    Enclosure  CC:  No Recipients    If you would like to receive this communication electronically, please contact [email or web address] or [phone] to request more information on [employer] Link access.    For providers and/or their staff who would like to refer a patient to [facility], please contact us through our one-stop-shop provider referral line, [facility], at [phone].    If you feel you have received this communication in error or would no longer like to receive these types of communications, please e-mail [email or web address]

## 2017-04-06 NOTE — LETTER
April 6, 2017      Adrianne Talamantes, APRN, CNS  [address]  [facility] [address]           [facility] - Urology 4th Floor  [address]  [facility] [address]  Phone: [phone]          Patient: Justin Barnes   MR Number: [id number]   YOB: 1960   Date of Visit: 4/6/2017       Dear Adrianne Talamantes:    Thank you for referring Justin Barnes to me for evaluation. Attached you will find relevant portions of my assessment and plan of care.    If you have questions, please do not hesitate to call me. I look forward to following Justin Barnes along with you.    Sincerely,    Linda Lechuga MD    Enclosure  CC:  No Recipients    If you would like to receive this communication electronically, please contact [email or web address] or [phone] to request more information on [employer] Link access.    For providers and/or their staff who would like to refer a patient to [facility], please contact us through our one-stop-shop provider referral line, [facility], at [phone].    If you feel you have received this communication in error or would no longer like to receive these types of communications, please e-mail [email or web address]

## 2017-04-06 NOTE — PROGRESS NOTES
"Dear Dr. Seaman,    Thank you for referring this patient to my clinic. The full details of my evaluation will also be forthcoming to you by letter.    CHIEF COMPLAINT:  Consult    I, Ana Ho, am scribing for, and in the presence of, Dr. Dennison.    HPI:  Justin Barnes is a 57 y.o.  female with a history of diabetes type II, CAD, and multiple sclerosis, who is referred to me by Dr. Seaman for evaluation of low back pain. Pt initially reported left-sided neck pain radiating into the LUE to the elbow. Pt states that she woke up one morning and could not lift her bilateral arms. Pt's pain then radiated into the RUE. Pt's RUE pain is worst than the LUE. Pt states that recently she has not had much neck pain. Pt reports difficulty walking, secondary to BLE pain. Pt states that her RLE is weaker than the LLE which has worsened within the last 6 months. Pt is not able to walk further than a block secondary to pain. Pt states that her legs feel "heavy". Pt additionally notes low back/right buttock pain radiating down the RLE. Pt was diagnosed with MS in 2000. Pt reports dropping objects with the hands. Pt feels as though her hands are weak. Pt reports urinary retention for the past few months. Pt's previous low back surgery was in 1990 to treat low back pain. Pt has had injections into the neck and low back.    Review of patient's allergies indicates:   Allergen Reactions    Amoxil [amoxicillin] Itching and Rash    Latex, natural rubber Hives and Swelling       Past Medical History:   Diagnosis Date    Anxiety 3/17/2014    Cataract     Cervical cancer 1996    Combined hyperlipidemia associated with type 2 diabetes mellitus     Coronary artery disease, non-occlusive     DDD (degenerative disc disease), cervical 2/24/2014    DDD (degenerative disc disease), lumbar 2/24/2014    Diabetes mellitus, type 2     Hand arthritis     bilateral    Hypertension associated with diabetes     Lumbar " postlaminectomy syndrome 2/24/2014    Multiple sclerosis 2/24/2014     Past Surgical History:   Procedure Laterality Date    BACK SURGERY      x2, both lumbar    CARDIAC CATHETERIZATION      CARPAL TUNNEL RELEASE Left     COLONOSCOPY      COLONOSCOPY N/A 7/26/2016    Procedure: COLONOSCOPY;  Surgeon: Jefe Sow MD;  Location: [facility];  Service: Endoscopy;  Laterality: N/A;    CYST REMOVAL      skin of back    Epidural Steroid injection      HYSTERECTOMY      with one ovary removed    OOPHORECTOMY      2nd ovary removed 1 year after Hysterectomy    OTHER SURGICAL HISTORY      cervical epidural injection    TUBAL LIGATION       Family History   Problem Relation Age of Onset    Breast cancer Neg Hx     Ovarian cancer Neg Hx      Social History   Substance Use Topics    Smoking status: Current Every Day Smoker     Packs/day: 0.50     Types: Cigarettes     Start date: 5/7/1973    Smokeless tobacco: Never Used    Alcohol use No        Review of Systems   HENT: Negative.    Eyes: Negative.    Respiratory: Negative.    Cardiovascular: Negative.    Gastrointestinal: Negative.    Endocrine: Negative.    Genitourinary: Negative.         Urinary retention   Musculoskeletal: Positive for back pain (Low back pain), myalgias (BUE, BLE) and neck pain. Negative for gait problem.   Skin: Negative.    Allergic/Immunologic: Negative.    Neurological: Positive for weakness (RLE). Negative for light-headedness, numbness and headaches.   Hematological: Negative.    Psychiatric/Behavioral: Negative for sleep disturbance.       OBJECTIVE:   Vital Signs:  Temp: 98.5 °F (36.9 °C) (04/06/17 0823)  Pulse: 81 (04/06/17 0823)  BP: (!) 166/88 (04/06/17 0823)    Physical Exam:    Vital signs: All nursing notes and vital signs reviewed -- afebrile, vital signs stable.  Constitutional: Patient sitting comfortably in chair. Appears well developed and well nourished.  Skin: Exposed areas are intact without abnormal markings,  rashes or other lesions.  HEENT: Normocephalic. Normal conjunctivae.  Cardiovascular: Normal rate and regular rhythm.  Respiratory: Chest wall rises and falls symmetrically, without signs of respiratory distress.  Abdomen: Soft and non-tender.  Extremities: Warm and without edema. Calves supple, non-tender.  Psych/Behavior: Normal affect.    Neurological:    Mental status: Alert and oriented. Conversational and appropriate.       Cranial Nerves: Grossly intact.    Motor:    Upper:  Deltoids Triceps Biceps WE WF     R 5/5 5/5 5/5 5/5 5/5 4+/5    L 5/5 5/5 5/5 5/5 5/5 4+/5      Lower:  HF KE KF DF PF EHL    R 4/5 5/5 5/5 5/5 5/5 5/5    L 4/5 5/5 5/5 5/5 5/5 5/5     Sensory: Intact sensation to light touch in all extremities. Romberg positive.    Reflexes:          DTR: 2+ symmetrically throughout.     Yu's: Negative.     Babinski's: Negative.     Clonus: Negative.    Cerebellar: Finger-to-nose and rapid alternating movements normal. Gait stable, fluid.    Spine:    Posture: Head well aligned over pelvis in front and side views.  No focal or global spinal deformity visible on inspection. Shoulders and hips even. No obvious leg length discrepancy. No scapula winging.    Bending: Full ROM with forward, back and lateral bending. No rib prominence with forward bend.    Cervical:      ROM: Full with flexion, extension, lateral rotation and ear-to-shoulder bend.      Midline TTP: Negative.     Spurling's test: Negative.     Lhermitte's: Negative.    Thoracic:     Midline TTP: Negative    Lumbar:     Midline TTP: Negative     Straight Leg Test: Negative     Crossed Straight Leg Test: Negative     Sciatic notch tenderness: Negative.    Other:     SI joint TTP: Negative.     Greater trochanter TTP: Negative.     Tenderness with external/internal hip rotation: Negative.    Diagnostic Results:  All imaging was independently reviewed by me.    MRI Brain, dated 10/2/2016:  1. Changes consistent with active MS    MRI  T-spine, dated 10/2/2016:  1. Partial visualization of lumbar hardware  2. Moderate spinal stenosis T9 to L1    MRI L-spine, dated 2/1/2017:  1. Previous L2 to S1 fusion   2. Moderate stenosis L1/2    MRI C-spine, dated 8/16/2016:  1. Moderate to severe stenosis C5/6    ASSESSMENT/PLAN:     Justin Barnes has bilateral C6 radiculopathy in the setting of moderate C5/6 stenosis, the symptoms of which are being at least partially controlled with cervical ESIs. Additionally she has multilevel stenosis proximal to her lumbar fusion, the extent of which is obscured by metal scatter artifact. My biggest concern neurologically is her bilateral proximal leg weakness, which could relate to her MS or to spinal cord stenosis. I do not see severe enough stenosis in the C spine to explain these symtpoms, unless there is instability as well and I will order dynamic images to assess. I also do not believe there is enough low spinal cord/conus medullaris compression to explain these symptoms but a CT myelogram would be very helpful to make the determination. Conus compression could explain her urinary retention symptoms, though I will await her upcoming urologic evaluation.     I recommend continued ESIs for her arm and leg pain, dynamic xrays for neck instability evaluation, CT myelogram for evaluation of low cord compression, and re-evaluation in 3 months to assess for neurologic progression versus stability.    The patient understands and agrees with the plan of care. All questions were answered.     1. CT Myelogram T-spine and L-spine  2. Flex/Ex X-ray C-spine  3. RTC 3 months    I, Dr. Dennison, personally performed the services described in this documentation as scribed by Ana Ho in my presence, and it is both accurate and complete.      Fan Dennison M.D.  Department of Neurosurgery  [facility]      .

## 2017-04-06 NOTE — PROGRESS NOTES
CHIEF COMPLAINT:    Mrs. Barnes is a 57 y.o. female presenting for a consultation at the request of Dr. Tejal Martinez. Patient presents with lower urinary symptoms and pelvic organ prolapse.    PRESENTING ILLNESS:    Justin Barnes is a 57 y.o. female who states that she saw her OBGYN who told her she has pelvic organ prolapse.  She denies having to splint to urinate or defecate.  She feels like she has had a dropped bladder for many years but about 1 year ago, she had a sensation of incomplete bladder emptying, has urgency and she generally feels like she still has to urinate even after she has just done so.  She has nocturia every 45 minutes, and it is the bladder that wakes her up, not that she is a bad sleeper.  She has an intermittent stream at night, and when she stands to urinate normally, she gets a gush of fluid per urethra.      She also has a history of multiple sclerosis.  Urinary symptoms as listed above.     She is under a fair amount of stress because her  is fighting pancreatic cancer and is on several therapies.      , hysterectomy for cervical cancer with unilateral oophorectomy, 1 year later the other was done for a benign ovarian cyst, she is not sexually active from a male factor, takes immondium for diarrhea.      REVIEW OF SYSTEMS:    Review of Systems   Constitutional: Negative.    HENT: Negative.    Eyes: Negative.    Respiratory: Negative.    Cardiovascular: Negative.    Gastrointestinal: Positive for diarrhea.   Genitourinary: Positive for frequency and urgency.   Musculoskeletal: Negative.    Skin: Negative.    Neurological: Negative.    Endo/Heme/Allergies: Negative.    Psychiatric/Behavioral: Negative.      PATIENT HISTORY:    Past Medical History:   Diagnosis Date    Anxiety 3/17/2014    Cataract     Cervical cancer     Combined hyperlipidemia associated with type 2 diabetes mellitus     Coronary artery disease, non-occlusive     DDD (degenerative disc  disease), cervical 2/24/2014    DDD (degenerative disc disease), lumbar 2/24/2014    Diabetes mellitus, type 2     Hand arthritis     bilateral    Hypertension associated with diabetes     Lumbar postlaminectomy syndrome 2/24/2014    Multiple sclerosis 2/24/2014       Past Surgical History:   Procedure Laterality Date    BACK SURGERY      x2, both lumbar    CARDIAC CATHETERIZATION      CARPAL TUNNEL RELEASE Left     COLONOSCOPY      COLONOSCOPY N/A 7/26/2016    Procedure: COLONOSCOPY;  Surgeon: Jefe Sow MD;  Location: [facility];  Service: Endoscopy;  Laterality: N/A;    CYST REMOVAL      skin of back    Epidural Steroid injection      HYSTERECTOMY      with one ovary removed    OOPHORECTOMY      2nd ovary removed 1 year after Hysterectomy    OTHER SURGICAL HISTORY      cervical epidural injection    TUBAL LIGATION         Family History   Problem Relation Age of Onset    Breast cancer Neg Hx     Ovarian cancer Neg Hx        Social History     Social History    Marital status:      Spouse name: N/A    Number of children: N/A    Years of education: N/A     Occupational History    Not on file.     Social History Main Topics    Smoking status: Current Every Day Smoker     Packs/day: 0.50     Types: Cigarettes     Start date: 5/7/1973    Smokeless tobacco: Never Used    Alcohol use No    Drug use: Yes     Special: Hydrocodone    Sexual activity: No     Other Topics Concern    Not on file     Social History Narrative    Was not raised by family, does not know history       Allergies:  Amoxil [amoxicillin] and Latex, natural rubber    Medications:  Outpatient Encounter Prescriptions as of 4/6/2017   Medication Sig Dispense Refill    amlodipine-benazepril 10-20mg (LOTREL) 10-20 mg per capsule Take 1 capsule by mouth once daily. 90 capsule 1    aspirin (ECOTRIN) 81 MG EC tablet Take 1 tablet (81 mg total) by mouth once daily.  0    atorvastatin (LIPITOR) 80 MG tablet Take 1  tablet (80 mg total) by mouth once daily. 90 tablet 3    baclofen (LIORESAL) 10 MG tablet Take 1 tablet (10 mg total) by mouth every evening. 90 tablet 3    blood sugar diagnostic (BLOOD GLUCOSE TEST) Strp Test glucose 1 x daily 100 strip prn    blood-glucose meter Misc Use as directed 1 each prn    diclofenac sodium (VOLTAREN) 1 % Gel Apply 2 g topically 4 (four) times daily as needed. 1 Tube 1    duloxetine (CYMBALTA) 60 MG capsule Take 1 capsule (60 mg total) by mouth once daily. 90 capsule 3    hydrocodone-acetaminophen 10-325mg (NORCO)  mg Tab Take 1 tablet by mouth 4 (four) times daily as needed. 120 tablet 0    [START ON 4/18/2017] hydrocodone-acetaminophen 10-325mg (NORCO)  mg Tab Take 1 tablet by mouth 4 (four) times daily as needed. 120 tablet 0    isosorbide mononitrate (IMDUR) 30 MG 24 hr tablet Take 1 tablet (30 mg total) by mouth once daily. 90 tablet 1    lancets Misc As directed 100 each prn    levocetirizine (XYZAL) 5 MG tablet Take 1 tablet (5 mg total) by mouth every evening. 30 tablet 11    metformin (GLUCOPHAGE-XR) 500 MG 24 hr tablet TAKE 2 TABLETS BY MOUTH TWICE DAILY WITH MEALS 360 tablet 1    metoprolol tartrate (LOPRESSOR) 25 MG tablet TAKE ONE TABLET BY MOUTH TWICE DAILY 180 tablet 1    nitroGLYCERIN (NITROSTAT) 0.4 MG SL tablet Place 1 tablet (0.4 mg total) under the tongue every 5 (five) minutes as needed for Chest pain. 27 tablet 0    omeprazole (PRILOSEC) 20 MG capsule TWO CAPSULES BY MOUTH EVERY  capsule 1    pregabalin (LYRICA) 100 MG capsule TAKE ONE CAPSULE BY MOUTH THREE TIMES DAILY *MAY CAUSE DROWSINESS* ** NO ALCOHOL ** 270 capsule 1    teriflunomide (AUBAGIO) 14 mg Tab Take by mouth.      tizanidine (ZANAFLEX) 4 MG tablet TAKE ONE TABLET BY MOUTH THREE TIMES DAILY AS NEEDED *MAY CAUSE DROWSINESS* 270 tablet 1    trospium (SANCTURA) 20 mg Tab tablet Take 1 tablet (20 mg total) by mouth 2 (two) times daily. 60 tablet 11    venlafaxine 225 mg  TR24 TAKE ONE TABLET BY MOUTH DAILY 90 each 1    vitamin D 1000 units Tab Take 10,000 mg by mouth once daily.       [DISCONTINUED] baclofen (LIORESAL) 10 MG tablet Take 1/2 tab at noon and 1 tab at bedtime. 45 tablet 3    [DISCONTINUED] oxybutynin (DITROPAN-XL) 5 MG TR24 Take 1 tablet (5 mg total) by mouth once daily. 30 tablet 5     No facility-administered encounter medications on file as of 4/6/2017.          PHYSICAL EXAMINATION:    The patient generally appears in good health, is appropriately interactive, and is in no apparent distress.    Skin: No lesions.    Mental: Cooperative with normal affect.    Neuro: Grossly intact.    HEENT: Normal. No evidence of lymphadenopathy.    Chest:  normal inspiratory effort.    Abdomen: Soft, non-tender. No masses or organomegaly. Bladder is not palpable. No evidence of flank discomfort. No evidence of inguinal hernia.    Extremities: No clubbing, cyanosis, or edema    Normal external female genitalia  Urethral meatus is normal  Urethra and bladder are nontender to bimanual exam  Anteriorly, grade II lateral cystocele   posteriorly small grade 1 rectocele, with detachment of the rectovaginal fascia from the perineal body  Uterus and cervix are surgically absent  No adnexal masses  PVR by catheterization was 30 ml    LABS:    UA 1.010, pH 6, otherwise, negative    IMPRESSION:    Encounter Diagnoses   Name Primary?    Nocturia Yes    Lateral cystocele        PLAN:    1. The catheterized specimen was sent for culture  2.  She would like to observe for now  3.  Discussed if she would like to have something done in the future, would check for first for occult stress incontinence and that could be reassess with colporrhaph

## 2017-04-07 ENCOUNTER — PATIENT MESSAGE (OUTPATIENT)
Dept: PAIN MEDICINE | Facility: CLINIC | Age: 57
End: 2017-04-07

## 2017-04-07 LAB — BACTERIA UR CULT: NO GROWTH

## 2017-04-13 ENCOUNTER — TELEPHONE (OUTPATIENT)
Dept: NEUROSURGERY | Facility: CLINIC | Age: 57
End: 2017-04-13

## 2017-04-13 NOTE — TELEPHONE ENCOUNTER
----- Message from Romy Bren sent at 4/13/2017  4:11 PM CDT -----  Contact: self @ [phone]  Pt says she has been scheduled for a mylogram but no spoke with her 1st.  She says she is not able to come in on a Thursday because her  has chemo therapy on Thursday.  pls call to reschedule.

## 2017-04-13 NOTE — TELEPHONE ENCOUNTER
Spoke with pt. Myelogram has been rescheduled for 5/8/2017. Appointment slips in the mail. Pt verbalized understanding.

## 2017-04-18 ENCOUNTER — PATIENT MESSAGE (OUTPATIENT)
Dept: FAMILY MEDICINE | Facility: CLINIC | Age: 57
End: 2017-04-18

## 2017-04-20 ENCOUNTER — OFFICE VISIT (OUTPATIENT)
Dept: FAMILY MEDICINE | Facility: CLINIC | Age: 57
End: 2017-04-20
Payer: MEDICARE

## 2017-04-20 VITALS
WEIGHT: 211.63 LBS | TEMPERATURE: 99 F | OXYGEN SATURATION: 96 % | DIASTOLIC BLOOD PRESSURE: 84 MMHG | BODY MASS INDEX: 35.26 KG/M2 | HEIGHT: 65 IN | HEART RATE: 97 BPM | SYSTOLIC BLOOD PRESSURE: 160 MMHG

## 2017-04-20 DIAGNOSIS — I25.10 CORONARY ARTERY DISEASE, NON-OCCLUSIVE: ICD-10-CM

## 2017-04-20 DIAGNOSIS — E11.40 CONTROLLED TYPE 2 DIABETES MELLITUS WITH DIABETIC NEUROPATHY, WITHOUT LONG-TERM CURRENT USE OF INSULIN: ICD-10-CM

## 2017-04-20 DIAGNOSIS — R10.9 ABDOMINAL PAIN, UNSPECIFIED LOCATION: ICD-10-CM

## 2017-04-20 DIAGNOSIS — E11.9 CONTROLLED TYPE 2 DIABETES MELLITUS WITHOUT COMPLICATION, WITHOUT LONG-TERM CURRENT USE OF INSULIN: ICD-10-CM

## 2017-04-20 DIAGNOSIS — E55.9 HYPOVITAMINOSIS D: ICD-10-CM

## 2017-04-20 DIAGNOSIS — M62.838 MUSCLE SPASM: ICD-10-CM

## 2017-04-20 DIAGNOSIS — I10 BENIGN ESSENTIAL HTN: ICD-10-CM

## 2017-04-20 DIAGNOSIS — M51.34 DDD (DEGENERATIVE DISC DISEASE), THORACIC: Primary | ICD-10-CM

## 2017-04-20 DIAGNOSIS — R07.9 CHEST PAIN, UNSPECIFIED TYPE: ICD-10-CM

## 2017-04-20 DIAGNOSIS — M51.37 DEGENERATION OF LUMBAR OR LUMBOSACRAL INTERVERTEBRAL DISC: ICD-10-CM

## 2017-04-20 PROCEDURE — 99499 UNLISTED E&M SERVICE: CPT | Mod: S$PBB,,, | Performed by: FAMILY MEDICINE

## 2017-04-20 PROCEDURE — 4010F ACE/ARB THERAPY RXD/TAKEN: CPT | Mod: S$GLB,,, | Performed by: FAMILY MEDICINE

## 2017-04-20 PROCEDURE — 3079F DIAST BP 80-89 MM HG: CPT | Mod: S$GLB,,, | Performed by: FAMILY MEDICINE

## 2017-04-20 PROCEDURE — 1160F RVW MEDS BY RX/DR IN RCRD: CPT | Mod: S$GLB,,, | Performed by: FAMILY MEDICINE

## 2017-04-20 PROCEDURE — 99215 OFFICE O/P EST HI 40 MIN: CPT | Mod: S$GLB,,, | Performed by: FAMILY MEDICINE

## 2017-04-20 PROCEDURE — 99999 PR PBB SHADOW E&M-EST. PATIENT-LVL III: CPT | Mod: PBBFAC,,, | Performed by: FAMILY MEDICINE

## 2017-04-20 PROCEDURE — 3077F SYST BP >= 140 MM HG: CPT | Mod: S$GLB,,, | Performed by: FAMILY MEDICINE

## 2017-04-20 PROCEDURE — 3045F PR MOST RECENT HEMOGLOBIN A1C LEVEL 7.0-9.0%: CPT | Mod: S$GLB,,, | Performed by: FAMILY MEDICINE

## 2017-04-20 RX ORDER — METOPROLOL TARTRATE 25 MG/1
25 TABLET, FILM COATED ORAL 2 TIMES DAILY
Qty: 180 TABLET | Refills: 1 | Status: SHIPPED | OUTPATIENT
Start: 2017-04-20 | End: 2017-04-20 | Stop reason: SDUPTHER

## 2017-04-20 RX ORDER — AMLODIPINE AND BENAZEPRIL HYDROCHLORIDE 10; 20 MG/1; MG/1
1 CAPSULE ORAL DAILY
Qty: 90 CAPSULE | Refills: 1 | Status: SHIPPED | OUTPATIENT
Start: 2017-04-20 | End: 2017-04-20 | Stop reason: SDUPTHER

## 2017-04-20 RX ORDER — TRAZODONE HYDROCHLORIDE 150 MG/1
150 TABLET ORAL NIGHTLY PRN
Qty: 30 TABLET | Refills: 0 | Status: SHIPPED | OUTPATIENT
Start: 2017-04-20 | End: 2017-04-20 | Stop reason: SDUPTHER

## 2017-04-20 RX ORDER — DULOXETIN HYDROCHLORIDE 60 MG/1
60 CAPSULE, DELAYED RELEASE ORAL DAILY
Qty: 90 CAPSULE | Refills: 3 | Status: SHIPPED | OUTPATIENT
Start: 2017-04-20 | End: 2021-08-03

## 2017-04-20 RX ORDER — TIZANIDINE 4 MG/1
TABLET ORAL
Qty: 270 TABLET | Refills: 1 | Status: SHIPPED | OUTPATIENT
Start: 2017-04-20 | End: 2017-11-15 | Stop reason: SDUPTHER

## 2017-04-20 RX ORDER — PREGABALIN 100 MG/1
CAPSULE ORAL
Qty: 270 CAPSULE | Refills: 1 | Status: SHIPPED | OUTPATIENT
Start: 2017-04-20 | End: 2017-11-15 | Stop reason: SDUPTHER

## 2017-04-20 RX ORDER — METFORMIN HYDROCHLORIDE 500 MG/1
1000 TABLET, EXTENDED RELEASE ORAL 2 TIMES DAILY WITH MEALS
Qty: 360 TABLET | Refills: 1 | Status: SHIPPED | OUTPATIENT
Start: 2017-04-20 | End: 2018-07-16

## 2017-04-20 RX ORDER — DICLOFENAC SODIUM 10 MG/G
2 GEL TOPICAL 4 TIMES DAILY PRN
Qty: 1 TUBE | Refills: 1 | Status: SHIPPED | OUTPATIENT
Start: 2017-04-20 | End: 2017-06-06 | Stop reason: SDUPTHER

## 2017-04-20 RX ORDER — ISOSORBIDE MONONITRATE 30 MG/1
30 TABLET, EXTENDED RELEASE ORAL DAILY
Qty: 90 TABLET | Refills: 1 | Status: ON HOLD | OUTPATIENT
Start: 2017-04-20 | End: 2020-02-14

## 2017-04-20 RX ORDER — METOPROLOL TARTRATE 25 MG/1
25 TABLET, FILM COATED ORAL 2 TIMES DAILY
Qty: 180 TABLET | Refills: 1 | Status: SHIPPED | OUTPATIENT
Start: 2017-04-20 | End: 2018-07-16

## 2017-04-20 RX ORDER — METFORMIN HYDROCHLORIDE 500 MG/1
1000 TABLET, EXTENDED RELEASE ORAL 2 TIMES DAILY WITH MEALS
Qty: 360 TABLET | Refills: 1 | Status: SHIPPED | OUTPATIENT
Start: 2017-04-20 | End: 2017-04-20 | Stop reason: SDUPTHER

## 2017-04-20 RX ORDER — OMEPRAZOLE 20 MG/1
CAPSULE, DELAYED RELEASE ORAL
Qty: 180 CAPSULE | Refills: 1 | Status: SHIPPED | OUTPATIENT
Start: 2017-04-20 | End: 2018-07-16 | Stop reason: SDUPTHER

## 2017-04-20 RX ORDER — VENLAFAXINE HYDROCHLORIDE 225 MG/1
1 TABLET, EXTENDED RELEASE ORAL DAILY
Qty: 90 EACH | Refills: 1 | Status: SHIPPED | OUTPATIENT
Start: 2017-04-20

## 2017-04-20 RX ORDER — AMLODIPINE AND BENAZEPRIL HYDROCHLORIDE 10; 20 MG/1; MG/1
1 CAPSULE ORAL DAILY
Qty: 90 CAPSULE | Refills: 1 | Status: SHIPPED | OUTPATIENT
Start: 2017-04-20 | End: 2018-07-16

## 2017-04-20 RX ORDER — TRAZODONE HYDROCHLORIDE 150 MG/1
150 TABLET ORAL NIGHTLY PRN
Qty: 30 TABLET | Refills: 0 | Status: SHIPPED | OUTPATIENT
Start: 2017-04-20 | End: 2017-12-05

## 2017-04-20 RX ORDER — ATORVASTATIN CALCIUM 80 MG/1
80 TABLET, FILM COATED ORAL DAILY
Qty: 90 TABLET | Refills: 3 | Status: SHIPPED | OUTPATIENT
Start: 2017-04-20 | End: 2017-04-27 | Stop reason: SDUPTHER

## 2017-04-20 RX ORDER — LEVOCETIRIZINE DIHYDROCHLORIDE 5 MG/1
5 TABLET, FILM COATED ORAL NIGHTLY
Qty: 30 TABLET | Refills: 11 | Status: SHIPPED | OUTPATIENT
Start: 2017-04-20 | End: 2023-01-24

## 2017-04-20 NOTE — PROGRESS NOTES
Chief Complaint   Patient presents with    Medication Refill       SUBJECTIVE:  Justin Barnes is a 57 y.o. female here for new problem of worsening conditions at home and they don't have a solid place to live and they are having trouble with her husbands care, she is doing a little better with the neurological side of it, the mental component is poor and she is not doing well with her diet and exercise with the diabetes.  Currently has co-morbidities including per problem list.      Past Medical History:   Diagnosis Date    Anxiety 3/17/2014    Cataract     Cervical cancer 1996    Combined hyperlipidemia associated with type 2 diabetes mellitus     Coronary artery disease, non-occlusive     DDD (degenerative disc disease), cervical 2/24/2014    DDD (degenerative disc disease), lumbar 2/24/2014    Diabetes mellitus, type 2     Hand arthritis     bilateral    Hypertension associated with diabetes     Lumbar postlaminectomy syndrome 2/24/2014    Multiple sclerosis 2/24/2014     Past Surgical History:   Procedure Laterality Date    BACK SURGERY      x2, both lumbar    CARDIAC CATHETERIZATION      CARPAL TUNNEL RELEASE Left     COLONOSCOPY      COLONOSCOPY N/A 7/26/2016    Procedure: COLONOSCOPY;  Surgeon: Jefe Sow MD;  Location: [facility];  Service: Endoscopy;  Laterality: N/A;    CYST REMOVAL      skin of back    Epidural Steroid injection      HYSTERECTOMY      with one ovary removed    OOPHORECTOMY      2nd ovary removed 1 year after Hysterectomy    OTHER SURGICAL HISTORY      cervical epidural injection    TUBAL LIGATION       Social History     Social History    Marital status:      Spouse name: N/A    Number of children: N/A    Years of education: N/A     Occupational History    Not on file.     Social History Main Topics    Smoking status: Current Every Day Smoker     Packs/day: 0.50     Types: Cigarettes     Start date: 5/7/1973    Smokeless tobacco: Never  Used    Alcohol use No    Drug use: Yes     Special: Hydrocodone    Sexual activity: No     Other Topics Concern    Not on file     Social History Narrative    Was not raised by family, does not know history     Family History   Problem Relation Age of Onset    Breast cancer Neg Hx     Ovarian cancer Neg Hx      Current Outpatient Prescriptions on File Prior to Visit   Medication Sig Dispense Refill    aspirin (ECOTRIN) 81 MG EC tablet Take 1 tablet (81 mg total) by mouth once daily.  0    blood sugar diagnostic (BLOOD GLUCOSE TEST) Strp Test glucose 1 x daily 100 strip prn    hydrocodone-acetaminophen 10-325mg (NORCO)  mg Tab Take 1 tablet by mouth 4 (four) times daily as needed. 120 tablet 0    lancets Misc As directed 100 each prn    nitroGLYCERIN (NITROSTAT) 0.4 MG SL tablet Place 1 tablet (0.4 mg total) under the tongue every 5 (five) minutes as needed for Chest pain. 27 tablet 0    teriflunomide (AUBAGIO) 14 mg Tab Take by mouth.      trospium (SANCTURA) 20 mg Tab tablet Take 1 tablet (20 mg total) by mouth 2 (two) times daily. 60 tablet 11    vitamin D 1000 units Tab Take 10,000 mg by mouth once daily.       blood-glucose meter Misc Use as directed 1 each prn     No current facility-administered medications on file prior to visit.      Review of patient's allergies indicates:   Allergen Reactions    Amoxil [amoxicillin] Itching and Rash    Latex, natural rubber Hives and Swelling         Review of Systems   Constitutional: Positive for malaise/fatigue.   Eyes: Negative.    Respiratory: Negative.    Cardiovascular: Negative.    Gastrointestinal: Negative.    Genitourinary: Negative.    Musculoskeletal: Positive for myalgias.   Skin: Negative.    Neurological: Positive for tingling, weakness and headaches.   Endo/Heme/Allergies: Negative.    Psychiatric/Behavioral: Positive for depression and memory loss. Negative for hallucinations, substance abuse and suicidal ideas. The patient is  "nervous/anxious and has insomnia.        OBJECTIVE:  BP (!) 160/84 (BP Location: Right arm, Patient Position: Sitting, BP Method: Manual)  Pulse 97  Temp 98.5 °F (36.9 °C) (Oral)   Ht 5' 5" (1.651 m)  Wt 96 kg (211 lb 10.3 oz)  SpO2 96%  BMI 35.22 kg/m2    Wt Readings from Last 3 Encounters:   04/20/17 96 kg (211 lb 10.3 oz)   04/06/17 92.5 kg (203 lb 14.8 oz)   04/06/17 95 kg (209 lb 6.4 oz)     BP Readings from Last 3 Encounters:   04/20/17 (!) 160/84   04/06/17 (!) 170/94   04/06/17 (!) 166/88       Appears chronically ill, alert and oriented but very distressed and upset.  Crying throughout visit due to social issues.  Cursory exam doesn't reveal any pressing acute physical issues.    Spent time in counseling otherwise    Review of old Records:  Reviewed per [facility]    Review of old labs:  Lab Results   Component Value Date    HGBA1C 7.4 (H) 02/01/2017    HGBA1C 8.1 (H) 10/18/2016    HGBA1C 7.9 (H) 06/28/2016     Lab Results   Component Value Date    LDLCALC 55.6 (L) 06/28/2016    CREATININE 0.8 01/31/2017         Review of old imaging:      ASSESSMENT:  Problem List Items Addressed This Visit     Hypovitaminosis D    Relevant Orders    DXA Bone Density Spine And Hip    Degeneration of lumbar or lumbosacral intervertebral disc    Relevant Medications    pregabalin (LYRICA) 100 MG capsule    trazodone (DESYREL) 150 MG tablet    Other Relevant Orders    DXA Bone Density Spine And Hip    DDD (degenerative disc disease), thoracic - Primary    Relevant Medications    pregabalin (LYRICA) 100 MG capsule    trazodone (DESYREL) 150 MG tablet    Other Relevant Orders    DXA Bone Density Spine And Hip    Coronary artery disease, non-occlusive    Relevant Medications    isosorbide mononitrate (IMDUR) 30 MG 24 hr tablet    Controlled type 2 diabetes mellitus with diabetic neuropathy, without long-term current use of insulin    Relevant Medications    metformin (GLUCOPHAGE-XR) 500 MG 24 hr tablet    Chest pain    Relevant " Medications    isosorbide mononitrate (IMDUR) 30 MG 24 hr tablet    Benign essential HTN    Relevant Medications    isosorbide mononitrate (IMDUR) 30 MG 24 hr tablet    amlodipine-benazepril 10-20mg (LOTREL) 10-20 mg per capsule    metoprolol tartrate (LOPRESSOR) 25 MG tablet      Other Visit Diagnoses     Abdominal pain, unspecified location        Relevant Medications    omeprazole (PRILOSEC) 20 MG capsule    Muscle spasm        Relevant Medications    duloxetine (CYMBALTA) 60 MG capsule          ICD-10-CM ICD-9-CM   1. DDD (degenerative disc disease), thoracic M51.34 722.51   2. Abdominal pain, unspecified location R10.9 789.00   3. Degeneration of lumbar or lumbosacral intervertebral disc M51.37 722.52   4. Hypovitaminosis D E55.9 268.9   5. Controlled type 2 diabetes mellitus without complication, without long-term current use of insulin E11.9 250.00   6. Benign essential HTN I10 401.1   7. Coronary artery disease, non-occlusive I25.10 414.00   8. Chest pain, unspecified type R07.9 786.50   9. Muscle spasm M62.838 728.85   10. Controlled type 2 diabetes mellitus with diabetic neuropathy, without long-term current use of insulin E11.40 250.60     357.2         PLAN:  1. Abdominal pain, unspecified location  The current medical regimen is effective;  continue present plan and medications.    - omeprazole (PRILOSEC) 20 MG capsule; TWO CAPSULES BY MOUTH EVERY DAY  Dispense: 180 capsule; Refill: 1    2. DDD (degenerative disc disease), thoracic  The current medical regimen is effective;  continue present plan and medications.    - DXA Bone Density Spine And Hip; Future  - pregabalin (LYRICA) 100 MG capsule; TAKE ONE CAPSULE BY MOUTH THREE TIMES DAILY *MAY CAUSE DROWSINESS* ** NO ALCOHOL **  Dispense: 270 capsule; Refill: 1  - trazodone (DESYREL) 150 MG tablet; Take 1 tablet (150 mg total) by mouth nightly as needed for Insomnia.  Dispense: 30 tablet; Refill: 0    3. Degeneration of lumbar or lumbosacral  intervertebral disc  The current medical regimen is effective;  continue present plan and medications.    - DXA Bone Density Spine And Hip; Future  - pregabalin (LYRICA) 100 MG capsule; TAKE ONE CAPSULE BY MOUTH THREE TIMES DAILY *MAY CAUSE DROWSINESS* ** NO ALCOHOL **  Dispense: 270 capsule; Refill: 1  - trazodone (DESYREL) 150 MG tablet; Take 1 tablet (150 mg total) by mouth nightly as needed for Insomnia.  Dispense: 30 tablet; Refill: 0    4. Hypovitaminosis D  Check bone health  - DXA Bone Density Spine And Hip; Future    5. Controlled type 2 diabetes mellitus without complication, without long-term current use of insulin  Work on better control and working on side effects  - Hemoglobin A1c; Future  - metformin (GLUCOPHAGE-XR) 500 MG 24 hr tablet; Take 2 tablets (1,000 mg total) by mouth 2 (two) times daily with meals.  Dispense: 360 tablet; Refill: 1    6. Benign essential HTN  The current medical regimen is effective;  continue present plan and medications.    - isosorbide mononitrate (IMDUR) 30 MG 24 hr tablet; Take 1 tablet (30 mg total) by mouth once daily.  Dispense: 90 tablet; Refill: 1  - amlodipine-benazepril 10-20mg (LOTREL) 10-20 mg per capsule; Take 1 capsule by mouth once daily.  Dispense: 90 capsule; Refill: 1  - metoprolol tartrate (LOPRESSOR) 25 MG tablet; Take 1 tablet (25 mg total) by mouth 2 (two) times daily.  Dispense: 180 tablet; Refill: 1    7. Coronary artery disease, non-occlusive  The current medical regimen is effective;  continue present plan and medications.  No recent chest pain issues  - isosorbide mononitrate (IMDUR) 30 MG 24 hr tablet; Take 1 tablet (30 mg total) by mouth once daily.  Dispense: 90 tablet; Refill: 1    8. Chest pain, unspecified type  Potential medication side effects were discussed with the patient; let me know if any occur.    - isosorbide mononitrate (IMDUR) 30 MG 24 hr tablet; Take 1 tablet (30 mg total) by mouth once daily.  Dispense: 90 tablet; Refill:  1    9. Muscle spasm  Treat for MSK pain  - duloxetine (CYMBALTA) 60 MG capsule; Take 1 capsule (60 mg total) by mouth once daily.  Dispense: 90 capsule; Refill: 3    Spent >40 minutes with the patient with 1/2 time in face to face counseling about the above.    Medication List with Changes/Refills   New Medications    TRAZODONE (DESYREL) 150 MG TABLET    Take 1 tablet (150 mg total) by mouth nightly as needed for Insomnia.   Current Medications    ASPIRIN (ECOTRIN) 81 MG EC TABLET    Take 1 tablet (81 mg total) by mouth once daily.    BLOOD SUGAR DIAGNOSTIC (BLOOD GLUCOSE TEST) STRP    Test glucose 1 x daily    BLOOD-GLUCOSE METER MISC    Use as directed    HYDROCODONE-ACETAMINOPHEN 10-325MG (NORCO)  MG TAB    Take 1 tablet by mouth 4 (four) times daily as needed.    LANCETS MISC    As directed    NITROGLYCERIN (NITROSTAT) 0.4 MG SL TABLET    Place 1 tablet (0.4 mg total) under the tongue every 5 (five) minutes as needed for Chest pain.    TERIFLUNOMIDE (AUBAGIO) 14 MG TAB    Take by mouth.    TROSPIUM (SANCTURA) 20 MG TAB TABLET    Take 1 tablet (20 mg total) by mouth 2 (two) times daily.    VITAMIN D 1000 UNITS TAB    Take 10,000 mg by mouth once daily.    Changed and/or Refilled Medications    Modified Medication Previous Medication    AMLODIPINE-BENAZEPRIL 10-20MG (LOTREL) 10-20 MG PER CAPSULE amlodipine-benazepril 10-20mg (LOTREL) 10-20 mg per capsule       Take 1 capsule by mouth once daily.    Take 1 capsule by mouth once daily.    ATORVASTATIN (LIPITOR) 80 MG TABLET atorvastatin (LIPITOR) 80 MG tablet       Take 1 tablet (80 mg total) by mouth once daily.    Take 1 tablet (80 mg total) by mouth once daily.    BACLOFEN (LIORESAL) 10 MG TABLET baclofen (LIORESAL) 10 MG tablet       Take 1 tablet (10 mg total) by mouth every evening.    Take 1 tablet (10 mg total) by mouth every evening.    DICLOFENAC SODIUM (VOLTAREN) 1 % GEL diclofenac sodium (VOLTAREN) 1 % Gel       Apply 2 g topically 4 (four) times  daily as needed.    Apply 2 g topically 4 (four) times daily as needed.    DULOXETINE (CYMBALTA) 60 MG CAPSULE duloxetine (CYMBALTA) 60 MG capsule       Take 1 capsule (60 mg total) by mouth once daily.    Take 1 capsule (60 mg total) by mouth once daily.    ISOSORBIDE MONONITRATE (IMDUR) 30 MG 24 HR TABLET isosorbide mononitrate (IMDUR) 30 MG 24 hr tablet       Take 1 tablet (30 mg total) by mouth once daily.    Take 1 tablet (30 mg total) by mouth once daily.    LEVOCETIRIZINE (XYZAL) 5 MG TABLET levocetirizine (XYZAL) 5 MG tablet       Take 1 tablet (5 mg total) by mouth every evening.    Take 1 tablet (5 mg total) by mouth every evening.    METFORMIN (GLUCOPHAGE-XR) 500 MG 24 HR TABLET metformin (GLUCOPHAGE-XR) 500 MG 24 hr tablet       Take 2 tablets (1,000 mg total) by mouth 2 (two) times daily with meals.    TAKE 2 TABLETS BY MOUTH TWICE DAILY WITH MEALS    METOPROLOL TARTRATE (LOPRESSOR) 25 MG TABLET metoprolol tartrate (LOPRESSOR) 25 MG tablet       Take 1 tablet (25 mg total) by mouth 2 (two) times daily.    TAKE ONE TABLET BY MOUTH TWICE DAILY    OMEPRAZOLE (PRILOSEC) 20 MG CAPSULE omeprazole (PRILOSEC) 20 MG capsule       TWO CAPSULES BY MOUTH EVERY DAY    TWO CAPSULES BY MOUTH EVERY DAY    PREGABALIN (LYRICA) 100 MG CAPSULE pregabalin (LYRICA) 100 MG capsule       TAKE ONE CAPSULE BY MOUTH THREE TIMES DAILY *MAY CAUSE DROWSINESS* ** NO ALCOHOL **    TAKE ONE CAPSULE BY MOUTH THREE TIMES DAILY *MAY CAUSE DROWSINESS* ** NO ALCOHOL **    TIZANIDINE (ZANAFLEX) 4 MG TABLET tizanidine (ZANAFLEX) 4 MG tablet       TAKE ONE TABLET BY MOUTH THREE TIMES DAILY AS NEEDED *MAY CAUSE DROWSINESS*    TAKE ONE TABLET BY MOUTH THREE TIMES DAILY AS NEEDED *MAY CAUSE DROWSINESS*    VENLAFAXINE 225 MG TR24 venlafaxine 225 mg TR24       Take 1 tablet by mouth once daily.    TAKE ONE TABLET BY MOUTH DAILY       Return in about 4 weeks (around 5/18/2017) for assess treatment plan.

## 2017-04-27 ENCOUNTER — LAB VISIT (OUTPATIENT)
Dept: LAB | Facility: HOSPITAL | Age: 57
End: 2017-04-27
Attending: PSYCHIATRY & NEUROLOGY
Payer: MEDICARE

## 2017-04-27 ENCOUNTER — PATIENT MESSAGE (OUTPATIENT)
Dept: NEUROLOGY | Facility: CLINIC | Age: 57
End: 2017-04-27

## 2017-04-27 ENCOUNTER — PATIENT MESSAGE (OUTPATIENT)
Dept: FAMILY MEDICINE | Facility: CLINIC | Age: 57
End: 2017-04-27

## 2017-04-27 DIAGNOSIS — Z79.899 HIGH RISK MEDICATION USE: ICD-10-CM

## 2017-04-27 DIAGNOSIS — M62.838 MUSCLE SPASM: ICD-10-CM

## 2017-04-27 DIAGNOSIS — G35 MULTIPLE SCLEROSIS: ICD-10-CM

## 2017-04-27 DIAGNOSIS — E11.9 CONTROLLED TYPE 2 DIABETES MELLITUS WITHOUT COMPLICATION, WITHOUT LONG-TERM CURRENT USE OF INSULIN: ICD-10-CM

## 2017-04-27 LAB
ALBUMIN SERPL BCP-MCNC: 3.4 G/DL
ALP SERPL-CCNC: 112 U/L
ALT SERPL W/O P-5'-P-CCNC: 19 U/L
AST SERPL-CCNC: 20 U/L
BASOPHILS # BLD AUTO: 0.03 K/UL
BASOPHILS NFR BLD: 0.3 %
BILIRUB DIRECT SERPL-MCNC: 0.2 MG/DL
BILIRUB SERPL-MCNC: 0.6 MG/DL
DIFFERENTIAL METHOD: ABNORMAL
EOSINOPHIL # BLD AUTO: 0.4 K/UL
EOSINOPHIL NFR BLD: 3.8 %
ERYTHROCYTE [DISTWIDTH] IN BLOOD BY AUTOMATED COUNT: 14.9 %
HCT VFR BLD AUTO: 37.8 %
HGB BLD-MCNC: 12.2 G/DL
LYMPHOCYTES # BLD AUTO: 3.1 K/UL
LYMPHOCYTES NFR BLD: 29.1 %
MCH RBC QN AUTO: 29 PG
MCHC RBC AUTO-ENTMCNC: 32.3 %
MCV RBC AUTO: 90 FL
MONOCYTES # BLD AUTO: 0.7 K/UL
MONOCYTES NFR BLD: 6.6 %
NEUTROPHILS # BLD AUTO: 6.3 K/UL
NEUTROPHILS NFR BLD: 59.8 %
PLATELET # BLD AUTO: 256 K/UL
PMV BLD AUTO: 10.9 FL
PROT SERPL-MCNC: 6.6 G/DL
RBC # BLD AUTO: 4.2 M/UL
WBC # BLD AUTO: 10.58 K/UL

## 2017-04-27 PROCEDURE — 80076 HEPATIC FUNCTION PANEL: CPT

## 2017-04-27 PROCEDURE — 36415 COLL VENOUS BLD VENIPUNCTURE: CPT | Mod: PO

## 2017-04-27 PROCEDURE — 85025 COMPLETE CBC W/AUTO DIFF WBC: CPT

## 2017-04-27 RX ORDER — BACLOFEN 10 MG/1
10 TABLET ORAL NIGHTLY
Qty: 90 TABLET | Refills: 3 | Status: SHIPPED | OUTPATIENT
Start: 2017-04-27 | End: 2018-01-23 | Stop reason: SDUPTHER

## 2017-04-27 RX ORDER — ATORVASTATIN CALCIUM 80 MG/1
80 TABLET, FILM COATED ORAL DAILY
Qty: 30 TABLET | Refills: 5 | Status: SHIPPED | OUTPATIENT
Start: 2017-04-27 | End: 2023-01-24

## 2017-04-28 ENCOUNTER — PATIENT MESSAGE (OUTPATIENT)
Dept: NEUROLOGY | Facility: CLINIC | Age: 57
End: 2017-04-28

## 2017-04-30 ENCOUNTER — PATIENT MESSAGE (OUTPATIENT)
Dept: NEUROSURGERY | Facility: CLINIC | Age: 57
End: 2017-04-30

## 2017-04-30 PROBLEM — E11.40 CONTROLLED TYPE 2 DIABETES MELLITUS WITH DIABETIC NEUROPATHY, WITHOUT LONG-TERM CURRENT USE OF INSULIN: Status: ACTIVE | Noted: 2017-02-21

## 2017-05-01 ENCOUNTER — HOSPITAL ENCOUNTER (OUTPATIENT)
Dept: RADIOLOGY | Facility: HOSPITAL | Age: 57
Discharge: HOME | End: 2017-05-01
Attending: FAMILY MEDICINE
Payer: MEDICARE

## 2017-05-01 ENCOUNTER — PATIENT MESSAGE (OUTPATIENT)
Dept: NEUROLOGY | Facility: CLINIC | Age: 57
End: 2017-05-01

## 2017-05-01 DIAGNOSIS — G35 MULTIPLE SCLEROSIS: Primary | ICD-10-CM

## 2017-05-01 DIAGNOSIS — R30.0 DYSURIA: Primary | ICD-10-CM

## 2017-05-01 DIAGNOSIS — M51.37 DEGENERATION OF LUMBAR OR LUMBOSACRAL INTERVERTEBRAL DISC: ICD-10-CM

## 2017-05-01 DIAGNOSIS — M51.34 DDD (DEGENERATIVE DISC DISEASE), THORACIC: ICD-10-CM

## 2017-05-01 DIAGNOSIS — E55.9 HYPOVITAMINOSIS D: ICD-10-CM

## 2017-05-01 PROCEDURE — 77080 DXA BONE DENSITY AXIAL: CPT | Mod: TC,PO

## 2017-05-01 PROCEDURE — 77080 DXA BONE DENSITY AXIAL: CPT | Mod: 26,,, | Performed by: RADIOLOGY

## 2017-05-01 RX ORDER — TERIFLUNOMIDE 14 MG/1
14 TABLET, FILM COATED ORAL DAILY
Qty: 90 TABLET | Refills: 1 | Status: SHIPPED | OUTPATIENT
Start: 2017-05-01 | End: 2017-11-17 | Stop reason: SDUPTHER

## 2017-05-03 ENCOUNTER — TELEPHONE (OUTPATIENT)
Dept: NEUROSURGERY | Facility: CLINIC | Age: 57
End: 2017-05-03

## 2017-05-03 NOTE — TELEPHONE ENCOUNTER
"Spoke with pt. Pt reported that she was recently hospitalized at [facility] and has "been through a lot" recently. Explained to pt why the myelogram was ordered. Offered to reschedule myelogram for a later date, but pt stated that she will keep her current appointment. Pt additionally reported increased neck pain and bilateral shoulder pain. Pt is scheduled to follow up with pain management on 5/16/2017. Advised pt to call back if does want to reschedule her myelogram. Pt verbalized understanding.   "

## 2017-05-04 ENCOUNTER — TELEPHONE (OUTPATIENT)
Dept: RADIOLOGY | Facility: HOSPITAL | Age: 57
End: 2017-05-04

## 2017-05-04 PROBLEM — M85.80 OSTEOPENIA: Status: ACTIVE | Noted: 2017-05-04

## 2017-05-05 ENCOUNTER — PATIENT MESSAGE (OUTPATIENT)
Dept: NEUROSURGERY | Facility: CLINIC | Age: 57
End: 2017-05-05

## 2017-05-09 ENCOUNTER — PATIENT MESSAGE (OUTPATIENT)
Dept: NEUROLOGY | Facility: CLINIC | Age: 57
End: 2017-05-09

## 2017-05-09 DIAGNOSIS — R39.9 UTI SYMPTOMS: Primary | ICD-10-CM

## 2017-05-16 ENCOUNTER — LAB VISIT (OUTPATIENT)
Dept: LAB | Facility: HOSPITAL | Age: 57
End: 2017-05-16
Attending: FAMILY MEDICINE
Payer: MEDICARE

## 2017-05-16 ENCOUNTER — OFFICE VISIT (OUTPATIENT)
Dept: PAIN MEDICINE | Facility: CLINIC | Age: 57
End: 2017-05-16
Payer: MEDICARE

## 2017-05-16 ENCOUNTER — PATIENT MESSAGE (OUTPATIENT)
Dept: NEUROLOGY | Facility: CLINIC | Age: 57
End: 2017-05-16

## 2017-05-16 VITALS
RESPIRATION RATE: 20 BRPM | WEIGHT: 209.19 LBS | DIASTOLIC BLOOD PRESSURE: 70 MMHG | SYSTOLIC BLOOD PRESSURE: 130 MMHG | BODY MASS INDEX: 34.81 KG/M2 | HEART RATE: 72 BPM | TEMPERATURE: 100 F

## 2017-05-16 DIAGNOSIS — M54.12 CERVICAL RADICULOPATHY: Primary | ICD-10-CM

## 2017-05-16 DIAGNOSIS — M50.30 DDD (DEGENERATIVE DISC DISEASE), CERVICAL: ICD-10-CM

## 2017-05-16 DIAGNOSIS — R39.9 UTI SYMPTOMS: ICD-10-CM

## 2017-05-16 DIAGNOSIS — M51.36 DDD (DEGENERATIVE DISC DISEASE), LUMBAR: ICD-10-CM

## 2017-05-16 DIAGNOSIS — G35 MULTIPLE SCLEROSIS: ICD-10-CM

## 2017-05-16 DIAGNOSIS — M96.1 POSTLAMINECTOMY SYNDROME OF LUMBAR REGION: ICD-10-CM

## 2017-05-16 DIAGNOSIS — M54.16 LUMBAR RADICULOPATHY: ICD-10-CM

## 2017-05-16 LAB
BACTERIA #/AREA URNS HPF: ABNORMAL /HPF
BILIRUB UR QL STRIP: NEGATIVE
CLARITY UR: CLEAR
COLOR UR: YELLOW
GLUCOSE UR QL STRIP: ABNORMAL
HGB UR QL STRIP: NEGATIVE
KETONES UR QL STRIP: NEGATIVE
LEUKOCYTE ESTERASE UR QL STRIP: ABNORMAL
MICROSCOPIC COMMENT: ABNORMAL
NITRITE UR QL STRIP: NEGATIVE
PH UR STRIP: 6 [PH] (ref 5–8)
PROT UR QL STRIP: NEGATIVE
RBC #/AREA URNS HPF: 2 /HPF (ref 0–4)
SP GR UR STRIP: 1.01 (ref 1–1.03)
SQUAMOUS #/AREA URNS HPF: 10 /HPF
URN SPEC COLLECT METH UR: ABNORMAL
WBC #/AREA URNS HPF: 50 /HPF (ref 0–5)
WBC CLUMPS URNS QL MICRO: ABNORMAL

## 2017-05-16 PROCEDURE — 3078F DIAST BP <80 MM HG: CPT | Mod: S$GLB,,, | Performed by: PHYSICIAN ASSISTANT

## 2017-05-16 PROCEDURE — 87088 URINE BACTERIA CULTURE: CPT

## 2017-05-16 PROCEDURE — 87186 SC STD MICRODIL/AGAR DIL: CPT

## 2017-05-16 PROCEDURE — 99213 OFFICE O/P EST LOW 20 MIN: CPT | Mod: S$GLB,,, | Performed by: PHYSICIAN ASSISTANT

## 2017-05-16 PROCEDURE — 99499 UNLISTED E&M SERVICE: CPT | Mod: S$PBB,,, | Performed by: PHYSICIAN ASSISTANT

## 2017-05-16 PROCEDURE — 87077 CULTURE AEROBIC IDENTIFY: CPT

## 2017-05-16 PROCEDURE — 99999 PR PBB SHADOW E&M-EST. PATIENT-LVL V: CPT | Mod: PBBFAC,,, | Performed by: PHYSICIAN ASSISTANT

## 2017-05-16 PROCEDURE — 87086 URINE CULTURE/COLONY COUNT: CPT

## 2017-05-16 PROCEDURE — 3075F SYST BP GE 130 - 139MM HG: CPT | Mod: S$GLB,,, | Performed by: PHYSICIAN ASSISTANT

## 2017-05-16 PROCEDURE — 81000 URINALYSIS NONAUTO W/SCOPE: CPT | Mod: PO

## 2017-05-16 PROCEDURE — 1160F RVW MEDS BY RX/DR IN RCRD: CPT | Mod: S$GLB,,, | Performed by: PHYSICIAN ASSISTANT

## 2017-05-16 RX ORDER — HYDROCODONE BITARTRATE AND ACETAMINOPHEN 10; 325 MG/1; MG/1
1 TABLET ORAL 4 TIMES DAILY PRN
Qty: 120 TABLET | Refills: 0 | Status: SHIPPED | OUTPATIENT
Start: 2017-05-18 | End: 2017-06-17

## 2017-05-16 RX ORDER — HYDROCODONE BITARTRATE AND ACETAMINOPHEN 10; 325 MG/1; MG/1
1 TABLET ORAL 4 TIMES DAILY PRN
Qty: 120 TABLET | Refills: 0 | Status: SHIPPED | OUTPATIENT
Start: 2017-07-17 | End: 2017-08-16 | Stop reason: SDUPTHER

## 2017-05-16 RX ORDER — HYDROCODONE BITARTRATE AND ACETAMINOPHEN 10; 325 MG/1; MG/1
1 TABLET ORAL 4 TIMES DAILY PRN
Qty: 120 TABLET | Refills: 0 | Status: SHIPPED | OUTPATIENT
Start: 2017-06-17 | End: 2017-07-17

## 2017-05-17 DIAGNOSIS — N39.0 URINARY TRACT INFECTION WITHOUT HEMATURIA, SITE UNSPECIFIED: Primary | ICD-10-CM

## 2017-05-17 RX ORDER — NITROFURANTOIN 25; 75 MG/1; MG/1
100 CAPSULE ORAL 2 TIMES DAILY
Qty: 14 CAPSULE | Refills: 0 | Status: SHIPPED | OUTPATIENT
Start: 2017-05-17 | End: 2017-05-24

## 2017-05-17 RX ORDER — NITROFURANTOIN 25; 75 MG/1; MG/1
100 CAPSULE ORAL 2 TIMES DAILY
Qty: 14 CAPSULE | Refills: 0 | Status: SHIPPED | OUTPATIENT
Start: 2017-05-17 | End: 2017-05-17 | Stop reason: SDUPTHER

## 2017-05-18 DIAGNOSIS — R60.9 EDEMA, UNSPECIFIED TYPE: Primary | ICD-10-CM

## 2017-05-18 NOTE — PROGRESS NOTES
This note was completed with dictation software and grammatical errors may exist.    CC: Back pain and neck pain     HPI: The patient is a 57-year-old woman with a history of hypertension, diabetes, multiple sclerosis, lumbar postlaminectomy syndrome who presents in self-referral for continued low back pain and bilateral leg pain, neck pain.  She is status post C7-T1 cervical interlaminar epidural steroid injection on 2/21/17 with at least 50% relief.  She is status post bilateral L1 transforaminal epidural steroid injections on 3/20/17 with 75% relief.  She plans of neck pain greater than back pain.  Her neck pain intermittently radiates to the left greater than right arms.  She reports pain across the low back but separate pain in her right lower leg with some swelling.  She complains of fatigue in her legs as well as weakness and numbness in her left arm.  She also reports intermittent bladder and bowel incontinence.    Pain intervention history: She has been followed by one of our previous [facility] pain management physicians, Dr. Chapa in [place] until recently.  She had undergone fusion surgery in 1983 and again surgery in 1993 for her lumbar spine.  She had undergone numerous lumbar spine injections in the past with good relief.  She has tried gabapentin with only minimal relief but states that Lyrica works best.  Sounds like she had a spinal cord stimulator trial in the past that did not help.  She has been taking hydrocodone up to 4 times a day for several years.  She is status post caudal epidural steroid injection on 8/6/14 with 60% relief.  She is status post caudal epidural steroid injection on 5/13/15 with 0% relief.  She is status post caudal epidural steroid injection on 10/2/15 with 0% relief.  She is status post C7-T1 cervical interlaminar epidural steroid injection on 9/7/16 with 100% relief of her bilateral arm pain and mild relief of her neck pain.  She is status post C7-T1 cervical  interlaminar epidural steroid injection on 2/21/17 with at least 50% relief.  She is status post bilateral L1 transforaminal epidural steroid injections on 3/20/17 with 75% relief.    ROS:She reports urinary frequency, joint stiffness, joint swelling, back pain, memory loss, anxiety, depression, difficulty sleeping and loss of balance.  Balance of review of systems is negative.    Medical, surgical, family and social history reviewed elsewhere in record.    Medications/Allergies: See med card    Vitals:    05/16/17 1105   BP: 130/70   Pulse: 72   Resp: 20   Temp: 99.5 °F (37.5 °C)   TempSrc: Oral   Weight: 94.9 kg (209 lb 3.2 oz)   PainSc:   6   PainLoc: Neck         Physical exam:  Gen: A and O x3, pleasant, well-groomed  Skin: No rashes or obvious lesions  HEENT: PERRLA  CVS: Regular rate and rhythm, normal S1 and S2, no murmurs.  Mild right greater than left lower extremity edema.  She has mild tenderness to palpation to the right calf.    Resp: Clear to auscultation bilaterally, no wheezes or rales.  Abdomen: Soft, NT/ND, normal bowel sounds present.  Musculoskeletal: Antalgic gait, slow cautious, kyphotic stance.      Neuro:  Upper extremities: 5/5 strength bilaterally  Lower extremities: 5/5 strength bilaterally  Reflexes: Brachioradialis 2+, Bicep 2+, Tricep 0+. Patellar 0+, Achilles 0+ bilaterally.  Sensory: Intact and symmetrical to light touch and pinprick in C2-T1 dermatomes bilaterally.Intact and symmetrical to light touch and pinprick in L2-S1 dermatomes bilaterally.    Cervical range of motion full with flexion and extension and lateral rotation with mild increased pain on extension and lateral rotation.  Spurling's test positive for neck pain and shoulder pain.  Myofascial exam: Mild tenderness palpation to the bilateral cervical paraspinous muscles and trapezius muscles.    Lumbar spine:  Lumbar spine: Range of motion is moderately decreased with flexion and extension with mild increased bilateral  low back pain.    Jeff's test causes no increased pain on either side.    Supine straight leg raise is positive for back pain only.  Internal and external rotation of the hip causes no increased on either side.  Myofascial exam: Mild tenderness to palpation across the lumbar paraspinous muscles.       Imaging:  3/11/14 MRI L-spine  At the T9-T10 level a disk protrusion appears to be present of approximately 3 mm not entirely visualized. At least mild central canal stenosis is noted. No obvious cord contact is noted.  At the T10-T11 level, disk protrusion appears to present a 4 mm. Moderate bilateral neural foraminal stenosis is noted. Mild central canal stenosis is noted. No definitive cord contact is noted.  At the T11-T12 level, a broad based disk protrusion is noted of approximately 3 to 4 mm paracentric to the right and left. Mild to moderate bilateral neural foraminal narrowing is noted. Mild central canal narrowing is noted.  At the T12-L1 level, disk protrusion appears to be present paracentric to the right of approximately 4 mm. Mild neuroforaminal narrowing is noted. Mild central canal stenosis is noted.  At the L1-L2 level, it is difficult to assess disk bulge or central canal stenosis or neural foraminal narrowing.  At the L2-L3 level, no significant disk bulge, central canal stenosis, or neural foraminal stenosis noted.  At the L3-L4 level, no significant central canal stenosis or neural foraminal stenosis is suspected. Evaluation is moderately hindered by metallic artifact.  At the L4-L5 level, no significant disk bulge, central canal stenosis, or neural foraminal stenosis is noted. Metallic artifact moderately hinders evaluation.  At the L5-S1 level, uncovering of the uncovertebral disk is noted. Mild neural foramina narrowing is suspected bilaterally. Mild central canal stenosis is suspected.    2/24/14: Flex/ext Xray: Extensive postsurgical changes noted involving the spine with orthopedic rods  along with pedicle screws at the L2 L4 and S1 levels. A mild levocurvature is noted centered at the L1-L2 level  One of the screws presumably a right sided screw at the S1 level appears to have fractured.   Disk prosthesis with partial osseous fusion is suspected at the L2-L3 L3-L4 L4-L5 levels. Intervertebral disk height loss is noted at the L5-S1 level.   An anterior listhesis is noted at the L5-S1 level a 1 cm. this appears stable on flexion and extension  Intervertebral disk height loss is noted at the L1-L2 and T12-L1 levels as well as at the T11-T12 level.    3/11/14 MRI C-spine  C2/C3: Minimal left-sided uncovertebral and is neural foraminal narrowing is noted. The central canal is intact.  C3/C4: Mild annular disk bulge and mild uncovertebral induced neural foraminal narrowing is noted bilaterally. The central canal is intact.  C4/C5: There is annular disk bulging and there is mild to moderate uncovertebral and facet induced neural foraminal narrowing bilaterally.  C5/C6: There is a mild central and right paracentral broad-based disk protrusion and there is prominent degenerative facet disease. This and the anterior subluxation produce mild canal stenosis. The central canal is narrowed to 9 mm. There is moderate uncovertebral and facet induced neural foraminal narrowing bilaterally.  C6/C7: There is mild disk bulging and mild uncovertebral induced neural foraminal narrowing is noted. The cord does not appear contacted.  C7/T1: There is a small right paracentral disk extrusion causing mild distortion of the central canal. The foramina are intact.    MRI lumbar spine with and without contrast 12/4/15  T11-12:There is chronic disk degeneration with disk dehydration, disk narrowing, vertebral endplate osteophytes, and degenerative vertebral endplate marrow change. There is a diffuse 2 mm posterior disk bulge causing mild dural compression and moderate bilateral foraminal stenosis. There has been no  change.  T12-L1:There is chronic disk degeneration with disk dehydration, disk narrowing, vertebral end plate osteophytes, and degenerative T1 plate marrow change. There is a diffuse 2-mm posterior disk bulge causing mild thecal sac compression and moderate bilateral foraminal stenosis. There has been no change.  L1-2:There is a broad-based 3.5-mm posterior disk extrusion causing moderate thecal sac compression and severe bilateral foraminal stenosis. There has been no change.  L2-3:There is intervertebral body fusion which is well united and well aligned. There is no compromise of the spinal canal or foramina and there has been no change.  L3-4:There is intervertebral body fusion which is well united and well aligned. There is no compromise of the spinal canal or foramina and there has been no change.  L4-5:There is intervertebral body fusion which is well united and well aligned. There is no compromise of the spinal canal or foramina and there has been no change.  L5-S1:There is chronic disk degeneration with disk dehydration and narrowing. There is chronic 5-mm anterior L5 subluxation causing severe left and moderate right foraminal stenosis. There has been no change.    MRI lumbar spine 2/1/17  There are postoperative changes of posterior instrumented fusion at L2-S1.  There is a grade I anterolisthesis of L5 on S1.  The lumbar vertebral bodies show normal height without evidence of acute compression fracture or pathologic marrow replacement process.  There is a levoconvex curvature of the lumbar spine. There is degenerative desiccation, disc space narrowing, and degenerative endplate change at T11-T12, T12-L1, L1-L2, and L5-S1.  There is near complete fusion of the L2, L3, L4, and L5 vertebral bodies.  There are probable postoperative changes of posterior decompression at L2-S1. The conus medullaris terminates at L1. The visualized retroperitoneal/abdominal soft tissue  structures are unremarkable.  T11-T12:  There is a broad disc bulge which flattens the ventral aspect of the conus.  There is ligamentum flavum thickening and facet arthropathy.  There is moderate bilateral neuroforaminal stenosis present.  There is mild-moderate central canal stenosis.  T12-L1: There is a broad disc bulge with a superimposed ascending right paracentral disc extrusion which extends approximately 10 mm above the disc plane.  There is mild-moderate central canal stenosis present.  There is mild right neuroforaminal stenosis.  L1-L2: There is a broad disc bulge throughout the period there is ligamentum flavum thickening.  The central canal is obscured.  There is a suggestion of a possible right paracentral disc protrusion.  There is mild-moderate overall central canal stenosis.  The neural foramina are not assessed secondary to metal artifact.  L2-L3: No central canal or neuroforaminal stenosis. No disc protrusion or extrusion.  L3-L4: No central canal or neuroforaminal stenosis. No disc protrusion or extrusion.  L4-L5: The left neural foramen is not optimally evaluated due to metal artifact.  No central canal stenosis or right neuroforaminal stenosis.  L5-S1: There is a grade I anterolisthesis of L5 on S1 with associated uncovering of the intervertebral disc.  No definite central canal stenosis or neuroforaminal stenosis.      Assessment:  The patient is a 57-year-old woman with a history of hypertension, diabetes, multiple sclerosis, lumbar postlaminectomy syndrome who presents in self-referral for continued low back pain and bilateral leg pain, neck pain.   1. Cervical radiculopathy     2. Lumbar radiculopathy     3. Postlaminectomy syndrome of lumbar region     4. DDD (degenerative disc disease), lumbar     5. DDD (degenerative disc disease), cervical     6. Multiple sclerosis                                                                                                                                                                                                  Plan:  1.  She did very well following the cervical ROBIN as well as the bilateral L1 TF ROBIN's.  These can be repeated in the future if necessary.  She is going to follow-up with Dr. Dennison in the near future as well.  2.  Dr. Seaman provided prescriptions for hydrocodone-acetaminophen 10/325 mg up to 4 times a day as needed for pain.  3.  If her right leg symptoms don't improve, we discussed ordering an ultrasound and I advised her to discuss this with her primary care.  She is going to switch back to Dr. Asencio in the near future.  4.  Follow-up in 3 months or sooner as needed.    Greater than 50% of this 20 minute visit was spent on counseling the patient.

## 2017-05-19 ENCOUNTER — PATIENT MESSAGE (OUTPATIENT)
Dept: NEUROLOGY | Facility: CLINIC | Age: 57
End: 2017-05-19

## 2017-05-19 LAB — BACTERIA UR CULT: NORMAL

## 2017-05-22 ENCOUNTER — TELEPHONE (OUTPATIENT)
Dept: PAIN MEDICINE | Facility: CLINIC | Age: 57
End: 2017-05-22

## 2017-05-22 ENCOUNTER — HOSPITAL ENCOUNTER (OUTPATIENT)
Dept: RADIOLOGY | Facility: HOSPITAL | Age: 57
Discharge: HOME | End: 2017-05-22
Attending: ANESTHESIOLOGY
Payer: MEDICARE

## 2017-05-22 DIAGNOSIS — R60.9 EDEMA, UNSPECIFIED TYPE: ICD-10-CM

## 2017-05-22 DIAGNOSIS — M71.20 BAKER CYST, UNSPECIFIED LATERALITY: Primary | ICD-10-CM

## 2017-05-22 PROCEDURE — 93970 EXTREMITY STUDY: CPT | Mod: TC,PO

## 2017-05-22 PROCEDURE — 93970 EXTREMITY STUDY: CPT | Mod: 26,,, | Performed by: RADIOLOGY

## 2017-05-22 NOTE — TELEPHONE ENCOUNTER
Please let the patient know that I reviewed her ultrasound results and she does not have a DVT.  She does have bilateral Baker cysts and I would like her to see orthopedics for further evaluation.

## 2017-05-23 ENCOUNTER — PATIENT MESSAGE (OUTPATIENT)
Dept: PAIN MEDICINE | Facility: CLINIC | Age: 57
End: 2017-05-23

## 2017-05-23 NOTE — TELEPHONE ENCOUNTER
Patient notified of results appointment scheduled for Orthopedic on 05/26/2017@ 8:00 am Dr. Alford.

## 2017-05-24 ENCOUNTER — PATIENT MESSAGE (OUTPATIENT)
Dept: NEUROLOGY | Facility: CLINIC | Age: 57
End: 2017-05-24

## 2017-05-24 ENCOUNTER — TELEPHONE (OUTPATIENT)
Dept: NEUROLOGY | Facility: CLINIC | Age: 57
End: 2017-05-24

## 2017-05-24 NOTE — TELEPHONE ENCOUNTER
I received the following message from the pt:    Hi Adrianne,  the antibiotic you sent in for me has my stomach very  sick. I had to stop taking it. Would have taken the last one this morning however  I haven't taken the last 4. I am drinking nausea medicine, but it's more than nausea. Also the UTI  is still not healed.

## 2017-05-26 ENCOUNTER — PATIENT MESSAGE (OUTPATIENT)
Dept: NEUROLOGY | Facility: CLINIC | Age: 57
End: 2017-05-26

## 2017-05-26 DIAGNOSIS — N39.0 URINARY TRACT INFECTION WITHOUT HEMATURIA, SITE UNSPECIFIED: Primary | ICD-10-CM

## 2017-05-26 RX ORDER — SULFAMETHOXAZOLE AND TRIMETHOPRIM 800; 160 MG/1; MG/1
1 TABLET ORAL 2 TIMES DAILY
Qty: 6 TABLET | Refills: 0 | Status: SHIPPED | OUTPATIENT
Start: 2017-05-26 | End: 2017-05-29

## 2017-05-29 DIAGNOSIS — M25.569 KNEE PAIN, UNSPECIFIED CHRONICITY, UNSPECIFIED LATERALITY: Primary | ICD-10-CM

## 2017-05-30 ENCOUNTER — TELEPHONE (OUTPATIENT)
Dept: FAMILY MEDICINE | Facility: CLINIC | Age: 57
End: 2017-05-30

## 2017-05-30 NOTE — TELEPHONE ENCOUNTER
----- Message from Yanique Alford sent at 5/30/2017  4:44 PM CDT -----  Contact: [facility] [phone]  Pharmacy is needing clarification on the venlafaxine 225 mg TR24 Please call  at your earliest convenience.  Thanks !

## 2017-06-01 ENCOUNTER — PATIENT MESSAGE (OUTPATIENT)
Dept: NEUROLOGY | Facility: CLINIC | Age: 57
End: 2017-06-01

## 2017-06-06 ENCOUNTER — OFFICE VISIT (OUTPATIENT)
Dept: ORTHOPEDICS | Facility: CLINIC | Age: 57
End: 2017-06-06
Payer: MEDICARE

## 2017-06-06 ENCOUNTER — HOSPITAL ENCOUNTER (OUTPATIENT)
Dept: RADIOLOGY | Facility: HOSPITAL | Age: 57
Discharge: HOME | End: 2017-06-06
Attending: ORTHOPAEDIC SURGERY
Payer: MEDICARE

## 2017-06-06 VITALS
DIASTOLIC BLOOD PRESSURE: 77 MMHG | HEART RATE: 84 BPM | WEIGHT: 209.19 LBS | BODY MASS INDEX: 34.85 KG/M2 | SYSTOLIC BLOOD PRESSURE: 111 MMHG | HEIGHT: 65 IN

## 2017-06-06 DIAGNOSIS — M17.0 PRIMARY OSTEOARTHRITIS OF BOTH KNEES: ICD-10-CM

## 2017-06-06 DIAGNOSIS — M25.561 ACUTE PAIN OF RIGHT KNEE: Primary | ICD-10-CM

## 2017-06-06 DIAGNOSIS — M25.569 KNEE PAIN, UNSPECIFIED CHRONICITY, UNSPECIFIED LATERALITY: ICD-10-CM

## 2017-06-06 DIAGNOSIS — M71.21 BAKER'S CYST OF KNEE, RIGHT: ICD-10-CM

## 2017-06-06 DIAGNOSIS — M79.604 RIGHT LEG PAIN: ICD-10-CM

## 2017-06-06 PROCEDURE — 73564 X-RAY EXAM KNEE 4 OR MORE: CPT | Mod: 26,LT,, | Performed by: RADIOLOGY

## 2017-06-06 PROCEDURE — 99204 OFFICE O/P NEW MOD 45 MIN: CPT | Mod: S$GLB,,, | Performed by: ORTHOPAEDIC SURGERY

## 2017-06-06 PROCEDURE — 99999 PR PBB SHADOW E&M-EST. PATIENT-LVL III: CPT | Mod: PBBFAC,,, | Performed by: ORTHOPAEDIC SURGERY

## 2017-06-06 PROCEDURE — 73564 X-RAY EXAM KNEE 4 OR MORE: CPT | Mod: 26,RT,, | Performed by: RADIOLOGY

## 2017-06-06 PROCEDURE — 99499 UNLISTED E&M SERVICE: CPT | Mod: S$PBB,,, | Performed by: ORTHOPAEDIC SURGERY

## 2017-06-06 RX ORDER — DICLOFENAC SODIUM 10 MG/G
2 GEL TOPICAL 4 TIMES DAILY PRN
Qty: 1 TUBE | Refills: 1 | Status: SHIPPED | OUTPATIENT
Start: 2017-06-06 | End: 2017-12-05

## 2017-06-06 RX ORDER — METHYLPREDNISOLONE 4 MG/1
4 TABLET ORAL DAILY
Qty: 25 TABLET | Refills: 0 | Status: SHIPPED | OUTPATIENT
Start: 2017-06-06 | End: 2017-06-16

## 2017-06-06 RX ORDER — MELOXICAM 15 MG/1
15 TABLET ORAL DAILY
Qty: 60 TABLET | Refills: 0 | Status: SHIPPED | OUTPATIENT
Start: 2017-06-06 | End: 2017-12-05

## 2017-06-06 NOTE — LETTER
June 6, 2017      Jeffry Seaman MD  [address] [facility] [address] [place] [address]           [facility]  [address] [facility] [address] [place] [address]  Phone: [phone]          Patient: Justin Barens   MR Number: [id number]   YOB: 1960   Date of Visit: 6/6/2017       Dear Dr. Jeffry Seaman:    Thank you for referring Justin Barnes to me for evaluation. Attached you will find relevant portions of my assessment and plan of care.    If you have questions, please do not hesitate to call me. I look forward to following Justin Barnes along with you.    Sincerely,    Wing Alford MD    Enclosure  CC:  No Recipients    If you would like to receive this communication electronically, please contact [email or web address] or [phone] to request more information on [employer] Link access.    For providers and/or their staff who would like to refer a patient to [facility], please contact us through our one-stop-shop provider referral line, [facility], at [phone].    If you feel you have received this communication in error or would no longer like to receive these types of communications, please e-mail [email or web address]

## 2017-06-06 NOTE — PATIENT INSTRUCTIONS
Day 1: 8 mg PO before breakfast, 4 mg after lunch and after dinner, and 8 mg at bedtime   Day 2: 4 mg PO before breakfast, after lunch, and after dinner and 8 mg at bedtime  Day 3: 4 mg PO before breakfast, after lunch, after dinner, and at bedtime  Day 4: 4 mg PO before breakfast, after lunch, and at bedtime   Day 5: 4 mg PO before breakfast and at bedtime  Day 6: 4 mg PO before breakfast

## 2017-06-07 NOTE — PROGRESS NOTES
Subjective:          Chief Complaint: Justin Barnes is a 57 y.o. female who had concerns including Pain of the Right Knee.    Mrs. Barnes has been having right knee pain since May 1, 2017. She states that she did not have an injury and has not had any knee issues prior to then. The pain is in the posterior calf primarily she states and she did have some swelling as well. An U/S was done that was negative for DVT however significant for a complex baker's cyst. She has not taken anything other than her normal pain medications and muscle spasm medications for pain. She has been out of her MS medications since the end of May she states.    Pain: 6/10 today      Pain   Associated symptoms include myalgias and neck pain.         Past Medical History:   Diagnosis Date    Anxiety 3/17/2014    Cataract     Cervical cancer 1996    Combined hyperlipidemia associated with type 2 diabetes mellitus     Coronary artery disease, non-occlusive     DDD (degenerative disc disease), cervical 2/24/2014    DDD (degenerative disc disease), lumbar 2/24/2014    Diabetes mellitus, type 2     Hand arthritis     bilateral    Hypertension associated with diabetes     Lumbar postlaminectomy syndrome 2/24/2014    Multiple sclerosis 2/24/2014       Past Surgical History:   Procedure Laterality Date    BACK SURGERY      x2, both lumbar    CARDIAC CATHETERIZATION      CARPAL TUNNEL RELEASE Left     COLONOSCOPY      COLONOSCOPY N/A 7/26/2016    Procedure: COLONOSCOPY;  Surgeon: Jefe Sow MD;  Location: [facility];  Service: Endoscopy;  Laterality: N/A;    CYST REMOVAL      skin of back    Epidural Steroid injection      HYSTERECTOMY      with one ovary removed    OOPHORECTOMY      2nd ovary removed 1 year after Hysterectomy    OTHER SURGICAL HISTORY      cervical epidural injection    TUBAL LIGATION         Family History   Problem Relation Age of Onset    Breast cancer Neg Hx     Ovarian cancer Neg Hx           Current Outpatient Prescriptions:     amlodipine-benazepril 10-20mg (LOTREL) 10-20 mg per capsule, Take 1 capsule by mouth once daily., Disp: 90 capsule, Rfl: 1    aspirin (ECOTRIN) 81 MG EC tablet, Take 1 tablet (81 mg total) by mouth once daily., Disp: , Rfl: 0    atorvastatin (LIPITOR) 80 MG tablet, Take 1 tablet (80 mg total) by mouth once daily., Disp: 30 tablet, Rfl: 5    baclofen (LIORESAL) 10 MG tablet, Take 1 tablet (10 mg total) by mouth every evening., Disp: 90 tablet, Rfl: 3    blood sugar diagnostic (BLOOD GLUCOSE TEST) Strp, Test glucose 1 x daily, Disp: 100 strip, Rfl: prn    blood-glucose meter Misc, Use as directed, Disp: 1 each, Rfl: prn    diclofenac sodium (VOLTAREN) 1 % Gel, Apply 2 g topically 4 (four) times daily as needed., Disp: 1 Tube, Rfl: 1    duloxetine (CYMBALTA) 60 MG capsule, Take 1 capsule (60 mg total) by mouth once daily., Disp: 90 capsule, Rfl: 3    hydrocodone-acetaminophen 10-325mg (NORCO)  mg Tab, Take 1 tablet by mouth 4 (four) times daily as needed., Disp: 120 tablet, Rfl: 0    [START ON 6/17/2017] hydrocodone-acetaminophen 10-325mg (NORCO)  mg Tab, Take 1 tablet by mouth 4 (four) times daily as needed., Disp: 120 tablet, Rfl: 0    [START ON 7/17/2017] hydrocodone-acetaminophen 10-325mg (NORCO)  mg Tab, Take 1 tablet by mouth 4 (four) times daily as needed., Disp: 120 tablet, Rfl: 0    isosorbide mononitrate (IMDUR) 30 MG 24 hr tablet, Take 1 tablet (30 mg total) by mouth once daily., Disp: 90 tablet, Rfl: 1    lancets Misc, As directed, Disp: 100 each, Rfl: prn    levocetirizine (XYZAL) 5 MG tablet, Take 1 tablet (5 mg total) by mouth every evening., Disp: 30 tablet, Rfl: 11    meloxicam (MOBIC) 15 MG tablet, Take 1 tablet (15 mg total) by mouth once daily., Disp: 60 tablet, Rfl: 0    metformin (GLUCOPHAGE-XR) 500 MG 24 hr tablet, Take 2 tablets (1,000 mg total) by mouth 2 (two) times daily with meals., Disp: 360 tablet, Rfl: 1     methylPREDNISolone (MEDROL) 4 MG Tab, Take 1 tablet (4 mg total) by mouth once daily. Patient has instructions, Disp: 25 tablet, Rfl: 0    metoprolol tartrate (LOPRESSOR) 25 MG tablet, Take 1 tablet (25 mg total) by mouth 2 (two) times daily., Disp: 180 tablet, Rfl: 1    nitroGLYCERIN (NITROSTAT) 0.4 MG SL tablet, Place 1 tablet (0.4 mg total) under the tongue every 5 (five) minutes as needed for Chest pain., Disp: 27 tablet, Rfl: 0    omeprazole (PRILOSEC) 20 MG capsule, TWO CAPSULES BY MOUTH EVERY DAY, Disp: 180 capsule, Rfl: 1    pregabalin (LYRICA) 100 MG capsule, TAKE ONE CAPSULE BY MOUTH THREE TIMES DAILY *MAY CAUSE DROWSINESS* ** NO ALCOHOL **, Disp: 270 capsule, Rfl: 1    teriflunomide (AUBAGIO) 14 mg Tab, Take 14 mg by mouth once daily., Disp: 90 tablet, Rfl: 1    tizanidine (ZANAFLEX) 4 MG tablet, TAKE ONE TABLET BY MOUTH THREE TIMES DAILY AS NEEDED *MAY CAUSE DROWSINESS*, Disp: 270 tablet, Rfl: 1    trazodone (DESYREL) 150 MG tablet, Take 1 tablet (150 mg total) by mouth nightly as needed for Insomnia., Disp: 30 tablet, Rfl: 0    trospium (SANCTURA) 20 mg Tab tablet, Take 1 tablet (20 mg total) by mouth 2 (two) times daily., Disp: 60 tablet, Rfl: 11    venlafaxine 225 mg TR24, Take 1 tablet by mouth once daily., Disp: 90 each, Rfl: 1    vitamin D 1000 units Tab, Take 10,000 mg by mouth once daily. , Disp: , Rfl:     Review of patient's allergies indicates:   Allergen Reactions    Amoxil [amoxicillin] Itching and Rash    Latex, natural rubber Hives and Swelling    Penicillins        Vitals:    06/06/17 0815   BP: 111/77   Pulse: 84       Review of Systems   Cardiovascular:        CAD   Endocrine:        Type II DM   Musculoskeletal: Positive for arthritis, back pain, joint pain, myalgias, neck pain and stiffness.   Psychiatric/Behavioral: The patient is nervous/anxious.                    Objective:        General: Justin is well-developed, well-nourished, appears stated age, in no acute  distress, alert and oriented to time, place and person.     General    Vitals reviewed.  Constitutional: She appears well-developed and well-nourished. She appears distressed.   HENT:   Head: Normocephalic and atraumatic.   Nose: Nose normal.   Eyes: Pupils are equal, round, and reactive to light.   Cardiovascular: Normal rate.    Pulmonary/Chest: Effort normal.   Neurological: She is alert.   Psychiatric: She has a normal mood and affect. Her behavior is normal. Judgment and thought content normal.           Right Knee Exam     Inspection   Erythema: absent  Swelling: absent  Effusion: present  Deformity: deformity  Bruising: absent    Tenderness   The patient is tender to palpation of the medial joint line and lateral joint line.    Range of Motion   Extension:  0 normal   Flexion:  140 normal     Tests   Meniscus   Miguel:  Medial - negative Lateral - negative  Ligament Examination Lachman: normal (-1 to 2mm) PCL-Posterior Drawer: normal (0 to 2mm)     MCL - Valgus: normal (0 to 2mm)  LCL - Varus: normal  Patella   Patellar Apprehension: negative  Passive Patellar Tilt: neutral  Patellar Tracking: normal  Patellar Glide (quadrants): Lateral - 1   Medial - 2  Q-Angle at 90 degrees: normal  Patellar Grind: negative    Other   Sensation: normal    Comments:  Right calf TTP on exam    Left Knee Exam     Inspection   Erythema: absent  Scars: absent  Swelling: absent  Effusion: absent  Deformity: deformity  Bruising: absent    Tenderness   The patient tender to palpation of the medial joint line.    Range of Motion   Extension:  0 normal   Flexion:  140 normal     Tests   Meniscus   Miguel:  Medial - negative Lateral - negative  Stability Lachman: normal (-1 to 2mm) PCL-Posterior Drawer: normal (0 to 2mm)  MCL - Valgus: normal (0 to 2mm)  LCL - Varus: normal (0 to 2mm)  Patella   Patellar Apprehension: negative  Passive Patellar Tilt: neutral  Patellar Tracking: normal  Patellar Glide (Quadrants): Medial -  2  Q-Angle at 90 degrees: normal  Patellar Grind: negative    Other   Sensation: normal    Muscle Strength   Right Lower Extremity   Hip Abduction: 5/5   Quadriceps:  5/5   Hamstrin/5   Left Lower Extremity   Hip Abduction: 5/5   Quadriceps:  5/5   Hamstrin/5     Vascular Exam     Right Pulses  Dorsalis Pedis:      2+  Posterior Tibial:      2+        Left Pulses  Dorsalis Pedis:      2+  Posterior Tibial:      2+              Current and previous radiographic studies and results were reviewed with the patient:   Findings:     There is mild right medial tibiofemoral joint space narrowing with PA flexion positioning of the knees.  There is mild left medial tibiofemoral joint space narrowing with AP standing positioning of the knees..  No fracture, dislocation, or osseous destructive process appreciated radiographically.  No patellar tilt or subluxation.  No knee joint effusion.  No chondrocalcinosis.    U/S:  Lower extremity venous bilateral       The right and left common femoral veins, femoral veins, greater saphenous veins proximally, popliteal veins, posterior tibial veins, peroneal veins, and anterior tibial veins appear patent without evidence of deep vein thrombosis being noted    Elongated hypoechoic regions are noted in the popliteal fossa bilaterally of 5.0 x 3.1 x 0.9 cm on the right and 4.3 x 2.6 x 1.3 cm on the left. The hypoechoic region on the right demonstrates some complexity suggestive of blood products or debris, no obvious flow is noted; a solid mass component within the Baker's cyst would be unlikely. Followup for size stability would be reasonable. MRI with contrast could be performed if immediate further evaluation is desired but this is  suggestive of a Baker's cyst with complexity   Impression         1.  No evidence of deep vein thrombosis as described above.      2. Bilateral Baker's cysts with complexity on the right most likely relating to debris or blood products but not  entirely specific. A solid mass lesion cannot be entirely excluded and followup for size stability with ultrasound or further evaluation with MRI with and without contrast may be of use           Assessment:       Encounter Diagnoses   Name Primary?    Acute pain of right knee Yes    Right leg pain     Baker's cyst of knee, right     Primary osteoarthritis of both knees           Plan:           Medrol Dose Pack x 1 then Meloxicam 15mg PO QD  Will f/u with me in 2 weeks and if symptoms have not improved will likely get MRI of right knee with contrast to further eval U/S findings; the left baker's cyst is asymptomatic  Expressed necessity to ensure she does not have long breaks in her MS medication regimen

## 2017-06-12 ENCOUNTER — PATIENT MESSAGE (OUTPATIENT)
Dept: NEUROLOGY | Facility: CLINIC | Age: 57
End: 2017-06-12

## 2017-06-12 RX ORDER — DIAZEPAM 5 MG/1
TABLET ORAL
Qty: 2 TABLET | Refills: 0 | Status: SHIPPED | OUTPATIENT
Start: 2017-06-12 | End: 2017-12-05

## 2017-06-14 ENCOUNTER — TELEPHONE (OUTPATIENT)
Dept: NEUROLOGY | Facility: CLINIC | Age: 57
End: 2017-06-14

## 2017-06-14 ENCOUNTER — PATIENT MESSAGE (OUTPATIENT)
Dept: NEUROLOGY | Facility: CLINIC | Age: 57
End: 2017-06-14

## 2017-06-14 DIAGNOSIS — G35 MULTIPLE SCLEROSIS: Primary | ICD-10-CM

## 2017-06-14 DIAGNOSIS — M54.2 NECK PAIN: ICD-10-CM

## 2017-06-18 ENCOUNTER — PATIENT MESSAGE (OUTPATIENT)
Dept: NEUROLOGY | Facility: CLINIC | Age: 57
End: 2017-06-18

## 2017-06-19 DIAGNOSIS — G35 MULTIPLE SCLEROSIS: Primary | ICD-10-CM

## 2017-07-06 ENCOUNTER — PATIENT MESSAGE (OUTPATIENT)
Dept: NEUROLOGY | Facility: CLINIC | Age: 57
End: 2017-07-06

## 2017-07-07 DIAGNOSIS — E11.9 TYPE 2 DIABETES MELLITUS WITHOUT COMPLICATION: ICD-10-CM

## 2017-07-11 ENCOUNTER — PATIENT MESSAGE (OUTPATIENT)
Dept: NEUROSURGERY | Facility: CLINIC | Age: 57
End: 2017-07-11

## 2017-07-11 ENCOUNTER — PATIENT MESSAGE (OUTPATIENT)
Dept: NEUROLOGY | Facility: CLINIC | Age: 57
End: 2017-07-11

## 2017-07-11 DIAGNOSIS — N39.0 URINARY TRACT INFECTION WITHOUT HEMATURIA, SITE UNSPECIFIED: Primary | ICD-10-CM

## 2017-07-17 ENCOUNTER — TELEPHONE (OUTPATIENT)
Dept: NEUROLOGY | Facility: CLINIC | Age: 57
End: 2017-07-17

## 2017-07-17 ENCOUNTER — PATIENT MESSAGE (OUTPATIENT)
Dept: NEUROLOGY | Facility: CLINIC | Age: 57
End: 2017-07-17

## 2017-07-18 ENCOUNTER — PATIENT MESSAGE (OUTPATIENT)
Dept: NEUROLOGY | Facility: CLINIC | Age: 57
End: 2017-07-18

## 2017-07-18 DIAGNOSIS — N39.0 URINARY TRACT INFECTION WITHOUT HEMATURIA, SITE UNSPECIFIED: Primary | ICD-10-CM

## 2017-07-18 RX ORDER — NITROFURANTOIN 25; 75 MG/1; MG/1
100 CAPSULE ORAL 2 TIMES DAILY
Qty: 20 CAPSULE | Refills: 0 | Status: SHIPPED | OUTPATIENT
Start: 2017-07-18 | End: 2017-07-21

## 2017-07-18 NOTE — TELEPHONE ENCOUNTER
Received UA and Cx results from sample left at [facility]. UA with moderate leukocytes, positive nitrites, and 10-20 WBC. Culture with >100,000 E coli cfu/ml. Will treat with Macrobid 100mg twice daily x10 days. Rx sent to pharmacy. Patient is aware.

## 2017-07-20 ENCOUNTER — PATIENT MESSAGE (OUTPATIENT)
Dept: NEUROLOGY | Facility: CLINIC | Age: 57
End: 2017-07-20

## 2017-07-21 ENCOUNTER — TELEPHONE (OUTPATIENT)
Dept: NEUROLOGY | Facility: CLINIC | Age: 57
End: 2017-07-21

## 2017-07-21 DIAGNOSIS — N39.0 URINARY TRACT INFECTION WITHOUT HEMATURIA, SITE UNSPECIFIED: Primary | ICD-10-CM

## 2017-07-21 RX ORDER — CIPROFLOXACIN 500 MG/1
500 TABLET ORAL EVERY 12 HOURS
Qty: 14 TABLET | Refills: 0 | Status: SHIPPED | OUTPATIENT
Start: 2017-07-21 | End: 2017-07-28

## 2017-08-13 ENCOUNTER — PATIENT MESSAGE (OUTPATIENT)
Dept: NEUROLOGY | Facility: CLINIC | Age: 57
End: 2017-08-13

## 2017-08-14 ENCOUNTER — PATIENT MESSAGE (OUTPATIENT)
Dept: RESEARCH | Facility: HOSPITAL | Age: 57
End: 2017-08-14

## 2017-08-14 ENCOUNTER — PATIENT MESSAGE (OUTPATIENT)
Dept: NEUROLOGY | Facility: CLINIC | Age: 57
End: 2017-08-14

## 2017-08-16 ENCOUNTER — LAB VISIT (OUTPATIENT)
Dept: LAB | Facility: HOSPITAL | Age: 57
End: 2017-08-16
Attending: CLINICAL NURSE SPECIALIST
Payer: MEDICARE

## 2017-08-16 ENCOUNTER — OFFICE VISIT (OUTPATIENT)
Dept: PAIN MEDICINE | Facility: CLINIC | Age: 57
End: 2017-08-16
Payer: MEDICARE

## 2017-08-16 VITALS
HEART RATE: 80 BPM | RESPIRATION RATE: 18 BRPM | WEIGHT: 211.5 LBS | SYSTOLIC BLOOD PRESSURE: 130 MMHG | DIASTOLIC BLOOD PRESSURE: 80 MMHG | TEMPERATURE: 99 F | BODY MASS INDEX: 35.2 KG/M2

## 2017-08-16 DIAGNOSIS — M54.12 CERVICAL RADICULOPATHY: Primary | ICD-10-CM

## 2017-08-16 DIAGNOSIS — M50.30 DDD (DEGENERATIVE DISC DISEASE), CERVICAL: ICD-10-CM

## 2017-08-16 DIAGNOSIS — M48.02 CERVICAL STENOSIS OF SPINAL CANAL: ICD-10-CM

## 2017-08-16 DIAGNOSIS — M54.16 LUMBAR RADICULOPATHY: ICD-10-CM

## 2017-08-16 DIAGNOSIS — Z79.899 HIGH RISK MEDICATION USE: ICD-10-CM

## 2017-08-16 DIAGNOSIS — G35 MULTIPLE SCLEROSIS: ICD-10-CM

## 2017-08-16 DIAGNOSIS — M96.1 POSTLAMINECTOMY SYNDROME OF LUMBAR REGION: ICD-10-CM

## 2017-08-16 DIAGNOSIS — M51.36 DDD (DEGENERATIVE DISC DISEASE), LUMBAR: ICD-10-CM

## 2017-08-16 DIAGNOSIS — Z79.891 OPIOID CONTRACT EXISTS: ICD-10-CM

## 2017-08-16 LAB
ALBUMIN SERPL BCP-MCNC: 4.1 G/DL
ALP SERPL-CCNC: 117 U/L
ALT SERPL W/O P-5'-P-CCNC: 24 U/L
AST SERPL-CCNC: 19 U/L
BASOPHILS # BLD AUTO: 0.06 K/UL
BASOPHILS NFR BLD: 0.5 %
BILIRUB DIRECT SERPL-MCNC: 0.1 MG/DL
BILIRUB SERPL-MCNC: 0.3 MG/DL
DIFFERENTIAL METHOD: ABNORMAL
EOSINOPHIL # BLD AUTO: 0.6 K/UL
EOSINOPHIL NFR BLD: 5.3 %
ERYTHROCYTE [DISTWIDTH] IN BLOOD BY AUTOMATED COUNT: 15 %
HCT VFR BLD AUTO: 39.7 %
HGB BLD-MCNC: 13.6 G/DL
LYMPHOCYTES # BLD AUTO: 2.6 K/UL
LYMPHOCYTES NFR BLD: 23 %
MCH RBC QN AUTO: 29.9 PG
MCHC RBC AUTO-ENTMCNC: 34.3 G/DL
MCV RBC AUTO: 87 FL
MONOCYTES # BLD AUTO: 0.7 K/UL
MONOCYTES NFR BLD: 6.3 %
NEUTROPHILS # BLD AUTO: 7.3 K/UL
NEUTROPHILS NFR BLD: 64.7 %
PLATELET # BLD AUTO: 290 K/UL
PMV BLD AUTO: 9.8 FL
PROT SERPL-MCNC: 7.7 G/DL
RBC # BLD AUTO: 4.55 M/UL
WBC # BLD AUTO: 11.22 K/UL

## 2017-08-16 PROCEDURE — 99999 PR PBB SHADOW E&M-EST. PATIENT-LVL V: CPT | Mod: PBBFAC,,, | Performed by: PHYSICIAN ASSISTANT

## 2017-08-16 PROCEDURE — 99499 UNLISTED E&M SERVICE: CPT | Mod: S$PBB,,, | Performed by: PHYSICIAN ASSISTANT

## 2017-08-16 PROCEDURE — 99213 OFFICE O/P EST LOW 20 MIN: CPT | Mod: S$GLB,,, | Performed by: PHYSICIAN ASSISTANT

## 2017-08-16 PROCEDURE — 3075F SYST BP GE 130 - 139MM HG: CPT | Mod: S$GLB,,, | Performed by: PHYSICIAN ASSISTANT

## 2017-08-16 PROCEDURE — 3008F BODY MASS INDEX DOCD: CPT | Mod: S$GLB,,, | Performed by: PHYSICIAN ASSISTANT

## 2017-08-16 PROCEDURE — 3079F DIAST BP 80-89 MM HG: CPT | Mod: S$GLB,,, | Performed by: PHYSICIAN ASSISTANT

## 2017-08-16 RX ORDER — HYDROCODONE BITARTRATE AND ACETAMINOPHEN 10; 325 MG/1; MG/1
1 TABLET ORAL 4 TIMES DAILY PRN
Qty: 120 TABLET | Refills: 0 | Status: SHIPPED | OUTPATIENT
Start: 2017-09-15 | End: 2017-10-15

## 2017-08-16 RX ORDER — HYDROCODONE BITARTRATE AND ACETAMINOPHEN 10; 325 MG/1; MG/1
1 TABLET ORAL 4 TIMES DAILY PRN
Qty: 120 TABLET | Refills: 0 | Status: SHIPPED | OUTPATIENT
Start: 2017-10-15 | End: 2017-11-14

## 2017-08-16 RX ORDER — HYDROCODONE BITARTRATE AND ACETAMINOPHEN 10; 325 MG/1; MG/1
1 TABLET ORAL 4 TIMES DAILY PRN
Qty: 120 TABLET | Refills: 0 | Status: SHIPPED | OUTPATIENT
Start: 2017-08-16 | End: 2017-09-15

## 2017-08-16 NOTE — PROGRESS NOTES
This note was completed with dictation software and grammatical errors may exist.    CC: Back pain and neck pain     HPI: The patient is a 57-year-old woman with a history of hypertension, diabetes, multiple sclerosis, lumbar postlaminectomy syndrome who presents in self-referral for continued low back pain and bilateral leg pain, neck pain. She returns in follow-up today with neck pain greater than back pain.  She feels that her neck pain is slowly getting worse and radiates throughout the right arm.  She describes this as sharp, worse with movement and improved with pain medication.  She also complains of low back pain that is worse with standing and walking, improved with sitting.  She complains of right lower leg pain and right knee pain secondary to Gibbons's cyst.  She reports weakness in all of her extremities and numbness and tingling in her right arm.  She occasionally has incontinence.    Pain intervention history: She has been followed by one of our previous [facility] pain management physicians, Dr. Chapa in [place] until recently.  She had undergone fusion surgery in 1983 and again surgery in 1993 for her lumbar spine.  She had undergone numerous lumbar spine injections in the past with good relief.  She has tried gabapentin with only minimal relief but states that Lyrica works best.  Sounds like she had a spinal cord stimulator trial in the past that did not help.  She has been taking hydrocodone up to 4 times a day for several years.  She is status post caudal epidural steroid injection on 8/6/14 with 60% relief.  She is status post caudal epidural steroid injection on 5/13/15 with 0% relief.  She is status post caudal epidural steroid injection on 10/2/15 with 0% relief.  She is status post C7-T1 cervical interlaminar epidural steroid injection on 9/7/16 with 100% relief of her bilateral arm pain and mild relief of her neck pain.  She is status post C7-T1 cervical interlaminar epidural steroid  injection on 2/21/17 with at least 50% relief.  She is status post bilateral L1 transforaminal epidural steroid injections on 3/20/17 with 75% relief.    ROS:She reports urinary frequency, joint stiffness, joint swelling, back pain, memory loss, anxiety, depression, difficulty sleeping and loss of balance.  Balance of review of systems is negative.    Medical, surgical, family and social history reviewed elsewhere in record.    Medications/Allergies: See med card    Vitals:    08/16/17 1022   BP: 130/80   Pulse: 80   Resp: 18   Temp: 98.5 °F (36.9 °C)   TempSrc: Oral   Weight: 95.9 kg (211 lb 8 oz)   PainSc:   4   PainLoc: Back         Physical exam:  Gen: A and O x3, pleasant, well-groomed  Skin: No rashes or obvious lesions  HEENT: PERRLA  CVS: Regular rate and rhythm, normal S1 and S2, no murmurs.  Mild right greater than left lower extremity edema.  She has mild tenderness to palpation to the right calf.    Resp: Clear to auscultation bilaterally, no wheezes or rales.  Abdomen: Soft, NT/ND, normal bowel sounds present.  Musculoskeletal: Antalgic gait, slow cautious, kyphotic stance.      Neuro:  Upper extremities: 5/5 strength bilaterally  Lower extremities: 5/5 strength bilaterally  Reflexes: Brachioradialis 2+, Bicep 2+, Tricep 0+. Patellar 0+, Achilles 0+ bilaterally.  Sensory: Intact and symmetrical to light touch and pinprick in C2-T1 dermatomes bilaterally.Intact and symmetrical to light touch and pinprick in L2-S1 dermatomes bilaterally.    Cervical range of motion full with flexion and extension and lateral rotation with moderate increased right greater than left neck pain on extension and lateral rotation.  Spurling's test positive for neck pain and right shoulder pain.  Myofascial exam: Mild tenderness palpation to the bilateral cervical paraspinous muscles and trapezius muscles, worse on the right.    Lumbar spine:  Lumbar spine: Range of motion is moderately decreased with flexion and extension with  mild increased bilateral low back pain.    Jeff's test causes no increased pain on either side.    Supine straight leg raise is positive for back pain only.  Internal and external rotation of the hip causes no increased on either side.  Myofascial exam: Mild tenderness to palpation across the lumbar paraspinous muscles.       Imaging:  3/11/14 MRI L-spine  At the T9-T10 level a disk protrusion appears to be present of approximately 3 mm not entirely visualized. At least mild central canal stenosis is noted. No obvious cord contact is noted.  At the T10-T11 level, disk protrusion appears to present a 4 mm. Moderate bilateral neural foraminal stenosis is noted. Mild central canal stenosis is noted. No definitive cord contact is noted.  At the T11-T12 level, a broad based disk protrusion is noted of approximately 3 to 4 mm paracentric to the right and left. Mild to moderate bilateral neural foraminal narrowing is noted. Mild central canal narrowing is noted.  At the T12-L1 level, disk protrusion appears to be present paracentric to the right of approximately 4 mm. Mild neuroforaminal narrowing is noted. Mild central canal stenosis is noted.  At the L1-L2 level, it is difficult to assess disk bulge or central canal stenosis or neural foraminal narrowing.  At the L2-L3 level, no significant disk bulge, central canal stenosis, or neural foraminal stenosis noted.  At the L3-L4 level, no significant central canal stenosis or neural foraminal stenosis is suspected. Evaluation is moderately hindered by metallic artifact.  At the L4-L5 level, no significant disk bulge, central canal stenosis, or neural foraminal stenosis is noted. Metallic artifact moderately hinders evaluation.  At the L5-S1 level, uncovering of the uncovertebral disk is noted. Mild neural foramina narrowing is suspected bilaterally. Mild central canal stenosis is suspected.    2/24/14: Flex/ext Xray: Extensive postsurgical changes noted involving the  spine with orthopedic rods along with pedicle screws at the L2 L4 and S1 levels. A mild levocurvature is noted centered at the L1-L2 level  One of the screws presumably a right sided screw at the S1 level appears to have fractured.   Disk prosthesis with partial osseous fusion is suspected at the L2-L3 L3-L4 L4-L5 levels. Intervertebral disk height loss is noted at the L5-S1 level.   An anterior listhesis is noted at the L5-S1 level a 1 cm. this appears stable on flexion and extension  Intervertebral disk height loss is noted at the L1-L2 and T12-L1 levels as well as at the T11-T12 level.    3/11/14 MRI C-spine  C2/C3: Minimal left-sided uncovertebral and is neural foraminal narrowing is noted. The central canal is intact.  C3/C4: Mild annular disk bulge and mild uncovertebral induced neural foraminal narrowing is noted bilaterally. The central canal is intact.  C4/C5: There is annular disk bulging and there is mild to moderate uncovertebral and facet induced neural foraminal narrowing bilaterally.  C5/C6: There is a mild central and right paracentral broad-based disk protrusion and there is prominent degenerative facet disease. This and the anterior subluxation produce mild canal stenosis. The central canal is narrowed to 9 mm. There is moderate uncovertebral and facet induced neural foraminal narrowing bilaterally.  C6/C7: There is mild disk bulging and mild uncovertebral induced neural foraminal narrowing is noted. The cord does not appear contacted.  C7/T1: There is a small right paracentral disk extrusion causing mild distortion of the central canal. The foramina are intact.    MRI lumbar spine with and without contrast 12/4/15  T11-12:There is chronic disk degeneration with disk dehydration, disk narrowing, vertebral endplate osteophytes, and degenerative vertebral endplate marrow change. There is a diffuse 2 mm posterior disk bulge causing mild dural compression and moderate bilateral foraminal stenosis.  There has been no change.  T12-L1:There is chronic disk degeneration with disk dehydration, disk narrowing, vertebral end plate osteophytes, and degenerative T1 plate marrow change. There is a diffuse 2-mm posterior disk bulge causing mild thecal sac compression and moderate bilateral foraminal stenosis. There has been no change.  L1-2:There is a broad-based 3.5-mm posterior disk extrusion causing moderate thecal sac compression and severe bilateral foraminal stenosis. There has been no change.  L2-3:There is intervertebral body fusion which is well united and well aligned. There is no compromise of the spinal canal or foramina and there has been no change.  L3-4:There is intervertebral body fusion which is well united and well aligned. There is no compromise of the spinal canal or foramina and there has been no change.  L4-5:There is intervertebral body fusion which is well united and well aligned. There is no compromise of the spinal canal or foramina and there has been no change.  L5-S1:There is chronic disk degeneration with disk dehydration and narrowing. There is chronic 5-mm anterior L5 subluxation causing severe left and moderate right foraminal stenosis. There has been no change.    MRI lumbar spine 2/1/17  There are postoperative changes of posterior instrumented fusion at L2-S1.  There is a grade I anterolisthesis of L5 on S1.  The lumbar vertebral bodies show normal height without evidence of acute compression fracture or pathologic marrow replacement process.  There is a levoconvex curvature of the lumbar spine. There is degenerative desiccation, disc space narrowing, and degenerative endplate change at T11-T12, T12-L1, L1-L2, and L5-S1.  There is near complete fusion of the L2, L3, L4, and L5 vertebral bodies.  There are probable postoperative changes of posterior decompression at L2-S1. The conus medullaris terminates at L1. The visualized retroperitoneal/abdominal soft tissue  structures are  unremarkable.  T11-T12: There is a broad disc bulge which flattens the ventral aspect of the conus.  There is ligamentum flavum thickening and facet arthropathy.  There is moderate bilateral neuroforaminal stenosis present.  There is mild-moderate central canal stenosis.  T12-L1: There is a broad disc bulge with a superimposed ascending right paracentral disc extrusion which extends approximately 10 mm above the disc plane.  There is mild-moderate central canal stenosis present.  There is mild right neuroforaminal stenosis.  L1-L2: There is a broad disc bulge throughout the period there is ligamentum flavum thickening.  The central canal is obscured.  There is a suggestion of a possible right paracentral disc protrusion.  There is mild-moderate overall central canal stenosis.  The neural foramina are not assessed secondary to metal artifact.  L2-L3: No central canal or neuroforaminal stenosis. No disc protrusion or extrusion.  L3-L4: No central canal or neuroforaminal stenosis. No disc protrusion or extrusion.  L4-L5: The left neural foramen is not optimally evaluated due to metal artifact.  No central canal stenosis or right neuroforaminal stenosis.  L5-S1: There is a grade I anterolisthesis of L5 on S1 with associated uncovering of the intervertebral disc.  No definite central canal stenosis or neuroforaminal stenosis.      Assessment:  The patient is a 57-year-old woman with a history of hypertension, diabetes, multiple sclerosis, lumbar postlaminectomy syndrome who presents in self-referral for continued low back pain and bilateral leg pain, neck pain.   1. Cervical radiculopathy     2. Lumbar radiculopathy     3. Postlaminectomy syndrome of lumbar region     4. DDD (degenerative disc disease), lumbar     5. DDD (degenerative disc disease), cervical     6. Multiple sclerosis     7. Cervical stenosis of spinal canal     8. Opioid contract exists                                                                                                                                                                                                  Plan:  1.  Dr. Seaman provided prescriptions for hydrocodone-acetaminophen 10/325 mg up to 4 times a day as needed for pain.  I was unable to look at her prescriptions on the [facility] website.  2.  We discussed repeating a cervical ROBIN and she would like to call to set this up.  3.  Follow-up in 3 months or sooner as needed.    Greater than 50% of this 15 minute visit was spent on counseling the patient.

## 2017-08-23 ENCOUNTER — PATIENT MESSAGE (OUTPATIENT)
Dept: PAIN MEDICINE | Facility: CLINIC | Age: 57
End: 2017-08-23

## 2017-08-23 DIAGNOSIS — M25.561 ACUTE PAIN OF RIGHT KNEE: ICD-10-CM

## 2017-08-23 DIAGNOSIS — M25.511 ACUTE PAIN OF RIGHT SHOULDER: Primary | ICD-10-CM

## 2017-10-02 ENCOUNTER — PATIENT MESSAGE (OUTPATIENT)
Dept: NEUROLOGY | Facility: CLINIC | Age: 57
End: 2017-10-02

## 2017-10-04 ENCOUNTER — PATIENT MESSAGE (OUTPATIENT)
Dept: UROLOGY | Facility: CLINIC | Age: 57
End: 2017-10-04

## 2017-10-09 ENCOUNTER — PATIENT MESSAGE (OUTPATIENT)
Dept: NEUROLOGY | Facility: CLINIC | Age: 57
End: 2017-10-09

## 2017-10-11 ENCOUNTER — PATIENT MESSAGE (OUTPATIENT)
Dept: UROLOGY | Facility: CLINIC | Age: 57
End: 2017-10-11

## 2017-10-11 ENCOUNTER — TELEPHONE (OUTPATIENT)
Dept: UROLOGY | Facility: HOSPITAL | Age: 57
End: 2017-10-11

## 2017-10-11 DIAGNOSIS — R35.1 NOCTURIA: ICD-10-CM

## 2017-10-11 RX ORDER — TROSPIUM CHLORIDE 20 MG/1
20 TABLET, FILM COATED ORAL 2 TIMES DAILY
Qty: 180 TABLET | Refills: 3 | Status: SHIPPED | OUTPATIENT
Start: 2017-10-11 | End: 2018-07-16 | Stop reason: SDUPTHER

## 2017-10-11 NOTE — TELEPHONE ENCOUNTER
Called the pharmacy and the pharmacist kept putting me on hold to check things.  I suggested that they order the medication from a different drug supplier.   I have not heard of a national shortage on trospium.      I called the patient and discussed the difficulty I had in dealing with her local pharmacy. She will find out which mail order pharmacy she uses and I will sent it there.

## 2017-10-11 NOTE — TELEPHONE ENCOUNTER
----- Message from Kamla Nesbitt LPN sent at 10/4/2017  4:31 PM CDT -----  Contact: [facility] [phone] (any pharmacist)      ----- Message -----  From: Kelly Adler MA  Sent: 9/28/2017   1:45 PM  To: Ankush Savage    Trospium 20 mg is not available from the  at this time, asking to discuss an alternative medication.     [facility] [phone] (any pharmacist)

## 2017-10-11 NOTE — TELEPHONE ENCOUNTER
Patient had difficulty obtaining her trospium at the local pharmacy.  Therefore, I contacted her and she sent me her mail order pharmacy information.   The prescription was re prescribed to [facility].    Pt notified that it was done.

## 2017-10-12 ENCOUNTER — PATIENT MESSAGE (OUTPATIENT)
Dept: PAIN MEDICINE | Facility: CLINIC | Age: 57
End: 2017-10-12

## 2017-10-12 NOTE — TELEPHONE ENCOUNTER
Please let the patient know I reviewed her new cervical spine MRI in there have not been any significant changes.  I recommend scheduling a cervical ROBIN with four-week follow-up.

## 2017-10-24 ENCOUNTER — PATIENT MESSAGE (OUTPATIENT)
Dept: PAIN MEDICINE | Facility: CLINIC | Age: 57
End: 2017-10-24

## 2017-10-25 NOTE — TELEPHONE ENCOUNTER
I don't believe we have a pain management doctor in [place] that can do this.  If it has to be done in [place], she would have to switch pain management doctors to a non-[facility] doctor.  Otherwise, perhaps she could have this done by one of our physicians on the [place] maybe in [place] or at [facility].  If she would like to do this, please help her schedule a C7-T1 cervical interlaminar epidural steroid injection.

## 2017-11-15 ENCOUNTER — OFFICE VISIT (OUTPATIENT)
Dept: PAIN MEDICINE | Facility: CLINIC | Age: 57
End: 2017-11-15
Payer: MEDICARE

## 2017-11-15 VITALS
DIASTOLIC BLOOD PRESSURE: 80 MMHG | RESPIRATION RATE: 18 BRPM | BODY MASS INDEX: 35.22 KG/M2 | SYSTOLIC BLOOD PRESSURE: 120 MMHG | HEART RATE: 78 BPM | TEMPERATURE: 98 F | WEIGHT: 211.63 LBS

## 2017-11-15 DIAGNOSIS — M48.02 CERVICAL STENOSIS OF SPINAL CANAL: Primary | ICD-10-CM

## 2017-11-15 DIAGNOSIS — M51.37 DEGENERATION OF LUMBAR OR LUMBOSACRAL INTERVERTEBRAL DISC: ICD-10-CM

## 2017-11-15 DIAGNOSIS — M54.12 CERVICAL RADICULOPATHY: ICD-10-CM

## 2017-11-15 DIAGNOSIS — M50.30 DDD (DEGENERATIVE DISC DISEASE), CERVICAL: ICD-10-CM

## 2017-11-15 DIAGNOSIS — M51.34 DDD (DEGENERATIVE DISC DISEASE), THORACIC: ICD-10-CM

## 2017-11-15 PROCEDURE — 99999 PR PBB SHADOW E&M-EST. PATIENT-LVL V: CPT | Mod: PBBFAC,,, | Performed by: ANESTHESIOLOGY

## 2017-11-15 PROCEDURE — 99499 UNLISTED E&M SERVICE: CPT | Mod: S$PBB,,, | Performed by: ANESTHESIOLOGY

## 2017-11-15 PROCEDURE — 99213 OFFICE O/P EST LOW 20 MIN: CPT | Mod: S$GLB,,, | Performed by: ANESTHESIOLOGY

## 2017-11-15 RX ORDER — HYDROCODONE BITARTRATE AND ACETAMINOPHEN 10; 325 MG/1; MG/1
1 TABLET ORAL EVERY 6 HOURS PRN
Qty: 120 TABLET | Refills: 0 | Status: SHIPPED | OUTPATIENT
Start: 2017-11-15 | End: 2017-12-15

## 2017-11-15 RX ORDER — SODIUM CHLORIDE, SODIUM LACTATE, POTASSIUM CHLORIDE, CALCIUM CHLORIDE 600; 310; 30; 20 MG/100ML; MG/100ML; MG/100ML; MG/100ML
INJECTION, SOLUTION INTRAVENOUS CONTINUOUS
Status: CANCELLED | OUTPATIENT
Start: 2017-12-07

## 2017-11-15 RX ORDER — PREGABALIN 100 MG/1
100 CAPSULE ORAL 3 TIMES DAILY
Qty: 270 CAPSULE | Refills: 1 | Status: SHIPPED | OUTPATIENT
Start: 2017-11-15 | End: 2017-11-21

## 2017-11-15 RX ORDER — HYDROCODONE BITARTRATE AND ACETAMINOPHEN 10; 325 MG/1; MG/1
1 TABLET ORAL EVERY 6 HOURS PRN
Qty: 120 TABLET | Refills: 0 | Status: SHIPPED | OUTPATIENT
Start: 2018-01-14 | End: 2018-02-13

## 2017-11-15 RX ORDER — TIZANIDINE 4 MG/1
4 TABLET ORAL EVERY 8 HOURS PRN
Qty: 270 TABLET | Refills: 1 | Status: SHIPPED | OUTPATIENT
Start: 2017-11-15 | End: 2017-12-15

## 2017-11-15 RX ORDER — HYDROCODONE BITARTRATE AND ACETAMINOPHEN 10; 325 MG/1; MG/1
1 TABLET ORAL EVERY 6 HOURS PRN
Qty: 120 TABLET | Refills: 0 | Status: SHIPPED | OUTPATIENT
Start: 2017-12-15 | End: 2018-01-14

## 2017-11-15 NOTE — PROGRESS NOTES
This note was completed with dictation software and grammatical errors may exist.    CC: Neck and bilateral arm pain     HPI: The patient is a 57-year-old woman with a history of hypertension, diabetes, multiple sclerosis, lumbar postlaminectomy syndrome who presents in self-referral for continued low back pain and bilateral leg pain, neck pain. She returns in follow-up today for severe neck pain and bilateral shoulder pain radiating into her arms.  Several months ago she reports having pain beginning in her bilateral elbows, forearms with numbness and tingling radiating throughout her bilateral hands.  It has now extended up to her bilateral shoulders, neck, clavicle.  She reports having severe pain with trying to lift her arms overhead, severe pain with extension.  She denies any alannah weakness in her arms but she has difficulty raising her arms up above her shoulders secondary to pain.  She has tenderness throughout her bilateral shoulders, denies any bowel or bladder incontinence, no balance issues.    Pain intervention history: She has been followed by one of our previous [facility] pain management physicians, Dr. Chapa in [place] until recently.  She had undergone fusion surgery in 1983 and again surgery in 1993 for her lumbar spine.  She had undergone numerous lumbar spine injections in the past with good relief.  She has tried gabapentin with only minimal relief but states that Lyrica works best.  Sounds like she had a spinal cord stimulator trial in the past that did not help.  She has been taking hydrocodone up to 4 times a day for several years.  She is status post caudal epidural steroid injection on 8/6/14 with 60% relief.  She is status post caudal epidural steroid injection on 5/13/15 with 0% relief.  She is status post caudal epidural steroid injection on 10/2/15 with 0% relief.  She is status post C7-T1 cervical interlaminar epidural steroid injection on 9/7/16 with 100% relief of her bilateral arm  pain and mild relief of her neck pain.  She is status post C7-T1 cervical interlaminar epidural steroid injection on 2/21/17 with at least 50% relief.  She is status post bilateral L1 transforaminal epidural steroid injections on 3/20/17 with 75% relief.    ROS:She reports urinary frequency, joint stiffness, joint swelling, back pain, memory loss, anxiety, depression, difficulty sleeping and loss of balance.  Balance of review of systems is negative.    Medical, surgical, family and social history reviewed elsewhere in record.    Medications/Allergies: See med card    Vitals:    11/15/17 1034   BP: 120/80   Pulse: 78   Resp: 18   Temp: 98.3 °F (36.8 °C)   TempSrc: Oral   Weight: 96 kg (211 lb 10.3 oz)   PainSc:   4   PainLoc: Neck         Physical exam:  Gen: A and O x3, pleasant, well-groomed  Skin: No rashes or obvious lesions  HEENT: PERRLA  CVS: Regular rate and rhythm, normal S1 and S2, no murmurs.  Mild right greater than left lower extremity edema.  She has mild tenderness to palpation to the right calf.    Resp: Clear to auscultation bilaterally, no wheezes or rales.  Abdomen: Soft, NT/ND, normal bowel sounds present.  Musculoskeletal: Antalgic gait, slow cautious, kyphotic stance.      Neuro:  Upper extremities: 5/5 strength bilaterally expect for 4/5 bilateral deltoids, cannot raise arms above 70 degrees b  Lower extremities: 5/5 strength bilaterally  Reflexes: Brachioradialis 2+, Bicep 2+, Tricep 0+. Patellar 0+, Achilles 0+ bilaterally.  Sensory: Intact and symmetrical to light touch and pinprick in C2-T1 dermatomes bilaterally.Intact and symmetrical to light touch and pinprick in L2-S1 dermatomes bilaterally.    Cervical range of motion moderately reduced with flexion and extension and lateral rotation with moderate increased in bilateral posterior neck.  Spurling's test positive for bilateral neck and shoulder pain  Myofascial exam: Exquisite tenderness palpation to the bilateral cervical paraspinous  muscles and trapezius muscles, slightly worse on the right.        Imaging:  MRI C-spine 10/10/17  Comparison MRI cervical spine 08/16/2016.  A minimal reversal of the mid cervical curvature again noted with anatomic alignment.  Degenerative changes of the discs including loss of height and desiccation also again noted at C4-5 and C5-6.  C3-4, moderate right facet arthropathy.  C4-5, bulging of the disc again noted with effacement of the adjacent ventral subarachnoid space.  C5-6, bulging of the disc again noted with superimposed small more focal right paracentral protrusion of the disc with complete effacement of ventral subarachnoid space and flattening of the ventral cord more so on the right, unchanged.  Overall mild degree of canal stenosis results.  Bilateral uncinate hypertrophy is present with moderate to severe bilateral foraminal stenosis.  C6-7, mild annular bulging slightly asymmetric to the left posteriorly.  C7-T1, small posterior midline/right prominent midline protrusion, unchanged.  Mild annular bulging also suspected T1-2.  The cervical cord maintains normal signal throughout.  No abnormal intra-dural enhancement is seen.    MRI lumbar spine 2/1/17  There are postoperative changes of posterior instrumented fusion at L2-S1.  There is a grade I anterolisthesis of L5 on S1.  The lumbar vertebral bodies show normal height without evidence of acute compression fracture or pathologic marrow replacement process.  There is a levoconvex curvature of the lumbar spine. There is degenerative desiccation, disc space narrowing, and degenerative endplate change at T11-T12, T12-L1, L1-L2, and L5-S1.  There is near complete fusion of the L2, L3, L4, and L5 vertebral bodies.  There are probable postoperative changes of posterior decompression at L2-S1. The conus medullaris terminates at L1. The visualized retroperitoneal/abdominal soft tissue  structures are unremarkable.  T11-T12: There is a broad disc bulge which  flattens the ventral aspect of the conus.  There is ligamentum flavum thickening and facet arthropathy.  There is moderate bilateral neuroforaminal stenosis present.  There is mild-moderate central canal stenosis.  T12-L1: There is a broad disc bulge with a superimposed ascending right paracentral disc extrusion which extends approximately 10 mm above the disc plane.  There is mild-moderate central canal stenosis present.  There is mild right neuroforaminal stenosis.  L1-L2: There is a broad disc bulge throughout the period there is ligamentum flavum thickening.  The central canal is obscured.  There is a suggestion of a possible right paracentral disc protrusion.  There is mild-moderate overall central canal stenosis.  The neural foramina are not assessed secondary to metal artifact.  L2-L3: No central canal or neuroforaminal stenosis. No disc protrusion or extrusion.  L3-L4: No central canal or neuroforaminal stenosis. No disc protrusion or extrusion.  L4-L5: The left neural foramen is not optimally evaluated due to metal artifact.  No central canal stenosis or right neuroforaminal stenosis.  L5-S1: There is a grade I anterolisthesis of L5 on S1 with associated uncovering of the intervertebral disc.  No definite central canal stenosis or neuroforaminal stenosis.      Assessment:  The patient is a 57-year-old woman with a history of hypertension, diabetes, multiple sclerosis, lumbar postlaminectomy syndrome who presents in self-referral for continued low back pain and bilateral leg pain, neck pain.   1. Cervical stenosis of spinal canal     2. DDD (degenerative disc disease), thoracic  pregabalin (LYRICA) 100 MG capsule   3. Degeneration of lumbar or lumbosacral intervertebral disc  pregabalin (LYRICA) 100 MG capsule   4. DDD (degenerative disc disease), cervical     5. Cervical radiculopathy  Vital signs    Activity as tolerated    Place 18-22 [facility] IV     Verify informed consent    Notify physician      Notify physician     Notify physician (specify)    Diet NPO    Case Request Operating Room: INJECTION-STEROID-EPIDURAL-CERVICAL    Place in Outpatient    lactated ringers infusion                                                                                                                                                                                               Plan:  1.  We reviewed her new cervical spine MRI and it does not show any major change however she does have definite canal stenosis and right greater than left lateral recess stenosis and foraminal stenosis at C5/6 likely causing her symptoms.  We discussed trying another epidural steroid injection, she has had relief with these in the past, I will set her up with cervical ROBIN and have her follow-up in several weeks after the injection.  We discussed that if she is having relief we can repeat this up to total 3 times.  If she is not having relief I'm going to have her see neurosurgery.  2.  I've refilled her Lyrica at 100 mg 3 times a day, tizanidine q8 hours when necessary and hydrocodone 10/325 up to 4 times a day for now.

## 2017-11-17 DIAGNOSIS — G35 MULTIPLE SCLEROSIS: ICD-10-CM

## 2017-11-21 ENCOUNTER — PATIENT MESSAGE (OUTPATIENT)
Dept: PAIN MEDICINE | Facility: CLINIC | Age: 57
End: 2017-11-21

## 2017-11-21 RX ORDER — PREGABALIN 150 MG/1
150 CAPSULE ORAL 3 TIMES DAILY
Qty: 90 CAPSULE | Refills: 2 | Status: SHIPPED | OUTPATIENT
Start: 2017-11-21 | End: 2018-03-15 | Stop reason: SDUPTHER

## 2017-11-24 ENCOUNTER — PATIENT MESSAGE (OUTPATIENT)
Dept: NEUROLOGY | Facility: CLINIC | Age: 57
End: 2017-11-24

## 2017-11-24 DIAGNOSIS — G35 MULTIPLE SCLEROSIS: Primary | ICD-10-CM

## 2017-11-24 DIAGNOSIS — Z79.899 HIGH RISK MEDICATION USE: ICD-10-CM

## 2017-11-27 ENCOUNTER — PATIENT MESSAGE (OUTPATIENT)
Dept: SURGERY | Facility: HOSPITAL | Age: 57
End: 2017-11-27

## 2017-11-27 ENCOUNTER — PATIENT MESSAGE (OUTPATIENT)
Dept: NEUROLOGY | Facility: CLINIC | Age: 57
End: 2017-11-27

## 2017-11-30 DIAGNOSIS — M50.30 DDD (DEGENERATIVE DISC DISEASE), CERVICAL: Primary | ICD-10-CM

## 2017-12-01 RX ORDER — TERIFLUNOMIDE 14 MG/1
TABLET, FILM COATED ORAL
Qty: 84 TABLET | Refills: 0 | Status: SHIPPED | OUTPATIENT
Start: 2017-12-01 | End: 2018-08-21 | Stop reason: SDUPTHER

## 2017-12-05 RX ORDER — GLIPIZIDE 5 MG/1
2.5 TABLET, FILM COATED, EXTENDED RELEASE ORAL 2 TIMES DAILY
COMMUNITY
End: 2018-07-16

## 2017-12-05 RX ORDER — FENOFIBRATE 145 MG/1
145 TABLET, FILM COATED ORAL DAILY
Status: ON HOLD | COMMUNITY
End: 2020-02-14

## 2017-12-06 ENCOUNTER — TELEPHONE (OUTPATIENT)
Dept: PAIN MEDICINE | Facility: CLINIC | Age: 57
End: 2017-12-06

## 2017-12-06 NOTE — TELEPHONE ENCOUNTER
----- Message from Estelle Oconnell sent at 12/6/2017  9:17 AM CST -----  Contact: self  Patient called asking for advice from [place] about the injection tommorrow.  Please call patient at [phone]. Thanks!

## 2017-12-22 ENCOUNTER — PATIENT MESSAGE (OUTPATIENT)
Dept: SURGERY | Facility: HOSPITAL | Age: 57
End: 2017-12-22

## 2017-12-22 ENCOUNTER — SURGERY (OUTPATIENT)
Age: 57
End: 2017-12-22

## 2017-12-22 ENCOUNTER — HOSPITAL ENCOUNTER (OUTPATIENT)
Facility: HOSPITAL | Age: 57
Discharge: HOME | End: 2017-12-22
Attending: ANESTHESIOLOGY | Admitting: ANESTHESIOLOGY
Payer: MEDICARE

## 2017-12-22 ENCOUNTER — HOSPITAL ENCOUNTER (OUTPATIENT)
Dept: RADIOLOGY | Facility: HOSPITAL | Age: 57
Discharge: HOME | End: 2017-12-22
Attending: ANESTHESIOLOGY | Admitting: ANESTHESIOLOGY
Payer: MEDICARE

## 2017-12-22 ENCOUNTER — TELEPHONE (OUTPATIENT)
Dept: PAIN MEDICINE | Facility: CLINIC | Age: 57
End: 2017-12-22

## 2017-12-22 VITALS
HEIGHT: 65 IN | WEIGHT: 213 LBS | SYSTOLIC BLOOD PRESSURE: 144 MMHG | OXYGEN SATURATION: 95 % | BODY MASS INDEX: 35.49 KG/M2 | TEMPERATURE: 98 F | DIASTOLIC BLOOD PRESSURE: 73 MMHG | HEART RATE: 92 BPM | RESPIRATION RATE: 18 BRPM

## 2017-12-22 DIAGNOSIS — M54.12 CERVICAL RADICULOPATHY: Primary | ICD-10-CM

## 2017-12-22 DIAGNOSIS — M50.30 DDD (DEGENERATIVE DISC DISEASE), CERVICAL: ICD-10-CM

## 2017-12-22 LAB — GLUCOSE SERPL-MCNC: 84 MG/DL (ref 70–110)

## 2017-12-22 PROCEDURE — 62321 NJX INTERLAMINAR CRV/THRC: CPT | Mod: PO | Performed by: ANESTHESIOLOGY

## 2017-12-22 PROCEDURE — 99152 MOD SED SAME PHYS/QHP 5/>YRS: CPT | Mod: ,,, | Performed by: ANESTHESIOLOGY

## 2017-12-22 PROCEDURE — 25500020 PHARM REV CODE 255: Mod: PO | Performed by: ANESTHESIOLOGY

## 2017-12-22 PROCEDURE — 25000003 PHARM REV CODE 250: Mod: PO | Performed by: ANESTHESIOLOGY

## 2017-12-22 PROCEDURE — 76000 FLUOROSCOPY <1 HR PHYS/QHP: CPT | Mod: TC,PO

## 2017-12-22 PROCEDURE — 62321 NJX INTERLAMINAR CRV/THRC: CPT | Mod: ,,, | Performed by: ANESTHESIOLOGY

## 2017-12-22 PROCEDURE — 82962 GLUCOSE BLOOD TEST: CPT | Mod: PO | Performed by: ANESTHESIOLOGY

## 2017-12-22 PROCEDURE — A4216 STERILE WATER/SALINE, 10 ML: HCPCS | Mod: PO | Performed by: ANESTHESIOLOGY

## 2017-12-22 PROCEDURE — 63600175 PHARM REV CODE 636 W HCPCS: Mod: PO | Performed by: ANESTHESIOLOGY

## 2017-12-22 RX ORDER — DICLOFENAC SODIUM 10 MG/G
2 GEL TOPICAL 4 TIMES DAILY
Qty: 3 TUBE | Refills: 2 | Status: SHIPPED | OUTPATIENT
Start: 2017-12-22 | End: 2021-04-13 | Stop reason: SDUPTHER

## 2017-12-22 RX ORDER — SODIUM CHLORIDE, SODIUM LACTATE, POTASSIUM CHLORIDE, CALCIUM CHLORIDE 600; 310; 30; 20 MG/100ML; MG/100ML; MG/100ML; MG/100ML
INJECTION, SOLUTION INTRAVENOUS CONTINUOUS
Status: DISCONTINUED | OUTPATIENT
Start: 2017-12-22 | End: 2017-12-22 | Stop reason: HOSPADM

## 2017-12-22 RX ORDER — METHYLPREDNISOLONE ACETATE 80 MG/ML
INJECTION, SUSPENSION INTRA-ARTICULAR; INTRALESIONAL; INTRAMUSCULAR; SOFT TISSUE
Status: DISCONTINUED | OUTPATIENT
Start: 2017-12-22 | End: 2017-12-22 | Stop reason: HOSPADM

## 2017-12-22 RX ORDER — SODIUM CHLORIDE 9 MG/ML
INJECTION, SOLUTION INTRAMUSCULAR; INTRAVENOUS; SUBCUTANEOUS
Status: DISCONTINUED | OUTPATIENT
Start: 2017-12-22 | End: 2017-12-22 | Stop reason: HOSPADM

## 2017-12-22 RX ORDER — MIDAZOLAM HYDROCHLORIDE 2 MG/2ML
INJECTION, SOLUTION INTRAMUSCULAR; INTRAVENOUS
Status: DISCONTINUED | OUTPATIENT
Start: 2017-12-22 | End: 2017-12-22 | Stop reason: HOSPADM

## 2017-12-22 RX ORDER — TIZANIDINE 4 MG/1
4 TABLET ORAL 3 TIMES DAILY PRN
Qty: 270 TABLET | Refills: 1 | Status: SHIPPED | OUTPATIENT
Start: 2017-12-22 | End: 2019-01-25 | Stop reason: SDUPTHER

## 2017-12-22 RX ORDER — LIDOCAINE HYDROCHLORIDE 10 MG/ML
1 INJECTION, SOLUTION EPIDURAL; INFILTRATION; INTRACAUDAL; PERINEURAL ONCE
Status: COMPLETED | OUTPATIENT
Start: 2017-12-22 | End: 2017-12-22

## 2017-12-22 RX ORDER — LIDOCAINE HYDROCHLORIDE 10 MG/ML
INJECTION, SOLUTION EPIDURAL; INFILTRATION; INTRACAUDAL; PERINEURAL
Status: DISCONTINUED | OUTPATIENT
Start: 2017-12-22 | End: 2017-12-22 | Stop reason: HOSPADM

## 2017-12-22 RX ADMIN — METHYLPREDNISOLONE ACETATE 80 MG: 80 INJECTION, SUSPENSION INTRA-ARTICULAR; INTRALESIONAL; INTRAMUSCULAR; SOFT TISSUE at 01:12

## 2017-12-22 RX ADMIN — MIDAZOLAM HYDROCHLORIDE 1 MG: 1 INJECTION, SOLUTION INTRAMUSCULAR; INTRAVENOUS at 01:12

## 2017-12-22 RX ADMIN — SODIUM CHLORIDE, SODIUM LACTATE, POTASSIUM CHLORIDE, CALCIUM CHLORIDE: 600; 310; 30; 20 INJECTION, SOLUTION INTRAVENOUS at 12:12

## 2017-12-22 RX ADMIN — IOHEXOL 3 ML: 300 INJECTION, SOLUTION INTRAVENOUS at 01:12

## 2017-12-22 RX ADMIN — LIDOCAINE HYDROCHLORIDE 10 ML: 10 INJECTION, SOLUTION EPIDURAL; INFILTRATION; INTRACAUDAL; PERINEURAL at 01:12

## 2017-12-22 RX ADMIN — SODIUM CHLORIDE 4 ML: 9 INJECTION INTRAMUSCULAR; INTRAVENOUS; SUBCUTANEOUS at 01:12

## 2017-12-22 RX ADMIN — LIDOCAINE HYDROCHLORIDE 1 MG: 10 INJECTION, SOLUTION EPIDURAL; INFILTRATION; INTRACAUDAL; PERINEURAL at 12:12

## 2017-12-22 RX ADMIN — MIDAZOLAM HYDROCHLORIDE 1 MG: 1 INJECTION, SOLUTION INTRAMUSCULAR; INTRAVENOUS at 12:12

## 2017-12-22 NOTE — DISCHARGE SUMMARY
[facility]  Discharge Note  Short Stay    Admit Date: 12/22/2017    Discharge Date: 12/22/2017    Attending Physician: Jeffry Seaman MD     Discharge Provider: Jeffry Seaman    Diagnoses:  Active Hospital Problems    Diagnosis  POA    *Cervical radiculopathy [M54.12]  Yes      Resolved Hospital Problems    Diagnosis Date Resolved POA   No resolved problems to display.       Discharged Condition: good    Final Diagnoses: Cervical radiculopathy [M54.12]    Disposition: Home or Self Care    Hospital Course: no complications, uneventful    Outcome of Hospitalization, Treatment, Procedure, or Surgery:  Patient was admitted for outpatient procedure. The patient underwent procedure without complications and are discharged home    Follow up/Patient Instructions:  Follow up as scheduled/Patient has received instructions and follow up date    Medications:  Continue previous medications      Discharge Procedure Orders  Call MD for:  temperature >100.4     Call MD for:  severe uncontrolled pain     Call MD for:  redness, tenderness, or signs of infection (pain, swelling, redness, odor or green/yellow discharge around incision site)     Call MD for:  severe persistent headache     No dressing needed           Discharge Procedure Orders (must include Diet, Follow-up, Activity):    Discharge Procedure Orders (must include Diet, Follow-up, Activity)  Call MD for:  temperature >100.4     Call MD for:  severe uncontrolled pain     Call MD for:  redness, tenderness, or signs of infection (pain, swelling, redness, odor or green/yellow discharge around incision site)     Call MD for:  severe persistent headache     No dressing needed

## 2017-12-22 NOTE — H&P
CC: Neck pain    HPI: The patient is a 58yo woman with a history of cervical radiculopathy here for cervical ROBIN. There are no major changes in history and physical from 11/15/17.    Past Medical History:   Diagnosis Date    Anxiety 3/17/2014    Cataract     Cervical cancer 1996    Combined hyperlipidemia associated with type 2 diabetes mellitus     Coronary artery disease, non-occlusive     DDD (degenerative disc disease), cervical 2/24/2014    DDD (degenerative disc disease), lumbar 2/24/2014    Diabetes mellitus, type 2     Hand arthritis     bilateral    Hypertension associated with diabetes     Lumbar postlaminectomy syndrome 2/24/2014    Multiple sclerosis 2/24/2014       Past Surgical History:   Procedure Laterality Date    BACK SURGERY      x2, both lumbar    CARDIAC CATHETERIZATION      CARPAL TUNNEL RELEASE Left     COLONOSCOPY      COLONOSCOPY N/A 7/26/2016    Procedure: COLONOSCOPY;  Surgeon: Jefe Sow MD;  Location: [facility];  Service: Endoscopy;  Laterality: N/A;    CYST REMOVAL      skin of back    Epidural Steroid injection      HYSTERECTOMY      with one ovary removed    OOPHORECTOMY      2nd ovary removed 1 year after Hysterectomy    OTHER SURGICAL HISTORY      cervical epidural injection    TUBAL LIGATION         Family History   Problem Relation Age of Onset    Breast cancer Neg Hx     Ovarian cancer Neg Hx        Social History     Social History    Marital status:      Spouse name: N/A    Number of children: N/A    Years of education: N/A     Social History Main Topics    Smoking status: Current Every Day Smoker     Packs/day: 0.50     Types: Cigarettes     Start date: 5/7/1973    Smokeless tobacco: Never Used    Alcohol use No    Drug use: Yes     Types: Hydrocodone    Sexual activity: No     Other Topics Concern    None     Social History Narrative    Was not raised by family, does not know history       Current Facility-Administered  "Medications   Medication Dose Route Frequency Provider Last Rate Last Dose    lactated ringers infusion   Intravenous Continuous Jeffry Seaman MD 25 mL/hr at 12/22/17 1214         Review of patient's allergies indicates:   Allergen Reactions    Amoxil [amoxicillin] Itching and Rash    Latex, natural rubber Hives and Swelling    Penicillins        Vitals:    12/22/17 1152   BP: 114/73   Pulse: 100   Temp: 97.5 °F (36.4 °C)   TempSrc: Skin   SpO2: 95%   Weight: 96.6 kg (213 lb)   Height: 5' 5" (1.651 m)       REVIEW OF SYSTEMS:     GENERAL: No weight loss, malaise or fevers.  HEENT:  No recent changes in vision or hearing  NECK: Negative for lumps, no difficulty with swallowing.  RESPIRATORY: Negative for cough, wheezing or shortness of breath, patient denies any recent URI.  CARDIOVASCULAR: Negative for chest pain, leg swelling or palpitations.  GI: Negative for abdominal discomfort, blood in stools or black stools or change in bowel habits.  MUSCULOSKELETAL: See HPI.  SKIN: Negative for lesions, rash, and itching.  PSYCH: No suicidal or homicidal ideations, no current mood disturbances.  HEMATOLOGY/LYMPHOLOGY: Negative for prolonged bleeding, bruising easily or swollen nodes. Patient is not currently taking any anti-coagulants  ENDO: No history of diabetes or thyroid dysfunction  NEURO: No history of syncope, paralysis, seizures or tremors.All other reviewed and negative other than HPI.    Physical exam:  Gen: A and O x3, pleasant, well-groomed  Skin: No rashes or obvious lesions  HEENT: PERRLA, no obvious deformities on ears or in canals. No thyroid masses, trachea midline, no palpable lymph nodes in neck, axilla.  CVS: Regular rate and rhythm, normal S1 and S2, no murmurs.  Resp: Clear to auscultation bilaterally.  Abdomen: Soft, NT/ND, normal bowel sounds present.  Musculoskeletal/Neuro: Moving all extremities    Assessment:  Cervical radiculopathy  -     Case Request Operating Room: " INJECTION-STEROID-EPIDURAL-CERVICAL  -     Activity as tolerated; Standing  -     Place in Outpatient; Standing  -     Diet NPO; Standing  -     lactated ringers infusion; Inject into the vein continuous.  -     Notify physician ; Standing  -     Notify physician ; Standing  -     Notify physician (specify); Standing  -     Place 18-22 gauage peripheral IV ; Standing  -     Verify informed consent; Standing  -     Vital signs; Standing    Other orders  -     lidocaine (PF) 10 mg/ml (1%) injection 10 mg; 1 mL (10 mg total) by Other route once.

## 2017-12-22 NOTE — OP NOTE
PROCEDURE DATE: 12/22/2017    Procedure: C7-T1 cervical interlaminar epidural steroid injection under utilizing fluoroscopy.    Diagnosis: Cervical Radiculopathy    POSTOP DIAGNOSIS: SAME    Physician: Jeffry Seaman MD    Medications injected:  Methylprednisone 80mg followed by a slow injection of 4 mL sterile, preservative-free normal saline.    Local anesthetic used: Lidocaine 1%, 4 ml.    Sedation Medications: 4mg versed    Complications:  none    Estimated blood loss: none    Technique:  A time-out was taken to identify patient and procedure prior to starting the procedure.  With the patient laying in a prone position with the neck in a mid-flexed forward position, the area was prepped and draped in the usual sterile fashion using ChloraPrep and a fenestrated drape.  The area was determined under AP fluoroscopic guidance.  Local anesthetic was given using a 25-gauge 1.5 inch needle by raising a wheal and then infiltrating ventrally.  A 3.5 inch 20-gauge Touhy needle was introduced under fluoroscopic guidance to meet the lamina of C7.  The needle was then hinged under the lamina then advanced using loss of resistance technique.  Once the tip of the needle was in the desired position, the contrast dye Omnipaque was injected to determine placement and no uptake.  The steroid was then injected slowly followed by a slow injection of 4 mL of the sterile preservative-free normal saline.  The patient tolerated the procedure well.    The patient was monitored after the procedure and was given post-procedure and discharge instructions to follow at home. The patient was discharged in a stable condition.

## 2017-12-22 NOTE — DISCHARGE INSTRUCTIONS
Home care instructions  Apply ice pack to the injection site for 20 minutes periods for the first 24 hrs for soreness/discomfort at injection site DO NOT USE HEAT FOR 24 HOURS  Keep site clean and dry for 24 hours, remove bandaid when desired  Do not drive until tomorrow  Take care when walking after a cervical injection  Avoid strenuous activities for 2 days  Make take 2 weeks to feel the full effects   Resume home medication as prescribed today  Resume Aspirin, Plavix, or Coumadin the day after the procedure unless otherwise instructed.    SEE IMMEDIATE MEDICAL HELP FOR:  Severe increase in your usual pain or appearance of new pain  Prolonged or increasing weakness or numbness in the legs or arms  Drainage, redness, active bleeding, or increased swelling at the injection site  Temperature over 100.0 degrees F.  Headache that increases when your head is upright and decreases when you lie flat    CALL 911 OR GO DIRECTLY TO EMERGENCY DEPARTMENT FOR:  Shortness of breath, chest pain, or problems breathing

## 2018-01-08 ENCOUNTER — PATIENT MESSAGE (OUTPATIENT)
Dept: PAIN MEDICINE | Facility: CLINIC | Age: 58
End: 2018-01-08

## 2018-01-08 NOTE — TELEPHONE ENCOUNTER
That's good news things are improving. Since it helped but is still causing pain, we can repeat the KATARINA to see if she gets further benefit, often it can take a few injections to get closer to full relief.

## 2018-01-19 ENCOUNTER — PATIENT MESSAGE (OUTPATIENT)
Dept: NEUROLOGY | Facility: CLINIC | Age: 58
End: 2018-01-19

## 2018-01-22 ENCOUNTER — PATIENT MESSAGE (OUTPATIENT)
Dept: PAIN MEDICINE | Facility: CLINIC | Age: 58
End: 2018-01-22

## 2018-01-23 ENCOUNTER — TELEPHONE (OUTPATIENT)
Dept: NEUROLOGY | Facility: CLINIC | Age: 58
End: 2018-01-23

## 2018-01-23 ENCOUNTER — OFFICE VISIT (OUTPATIENT)
Dept: NEUROLOGY | Facility: CLINIC | Age: 58
End: 2018-01-23
Payer: MEDICARE

## 2018-01-23 VITALS
SYSTOLIC BLOOD PRESSURE: 142 MMHG | BODY MASS INDEX: 35.87 KG/M2 | HEIGHT: 65 IN | WEIGHT: 215.31 LBS | HEART RATE: 83 BPM | DIASTOLIC BLOOD PRESSURE: 88 MMHG

## 2018-01-23 DIAGNOSIS — G35 MULTIPLE SCLEROSIS: Primary | ICD-10-CM

## 2018-01-23 DIAGNOSIS — Z79.899 HIGH RISK MEDICATION USE: ICD-10-CM

## 2018-01-23 DIAGNOSIS — M62.838 MUSCLE SPASM: ICD-10-CM

## 2018-01-23 DIAGNOSIS — R19.7 DIARRHEA, UNSPECIFIED TYPE: ICD-10-CM

## 2018-01-23 DIAGNOSIS — Z29.89 PROPHYLACTIC IMMUNOTHERAPY: ICD-10-CM

## 2018-01-23 DIAGNOSIS — G89.29 OTHER CHRONIC PAIN: ICD-10-CM

## 2018-01-23 DIAGNOSIS — Z71.89 COUNSELING REGARDING GOALS OF CARE: ICD-10-CM

## 2018-01-23 PROCEDURE — 99215 OFFICE O/P EST HI 40 MIN: CPT | Mod: S$GLB,,, | Performed by: CLINICAL NURSE SPECIALIST

## 2018-01-23 PROCEDURE — 99999 PR PBB SHADOW E&M-EST. PATIENT-LVL III: CPT | Mod: PBBFAC,,, | Performed by: CLINICAL NURSE SPECIALIST

## 2018-01-23 RX ORDER — BACLOFEN 10 MG/1
TABLET ORAL
Qty: 45 TABLET | Refills: 5 | Status: SHIPPED | OUTPATIENT
Start: 2018-01-23 | End: 2018-07-16 | Stop reason: SDUPTHER

## 2018-01-23 NOTE — Clinical Note
Justin is taking tizanidine 4mg in the morning and 8mg at bedtime, along with Baclofen 5mg in the morning and 10mg at bedtime. She had been without baclofen for a few months, and spasms were worse. I just want to make sure it's ok for her to be taking both.

## 2018-01-23 NOTE — Clinical Note
"Please review my assessment on this patient at bottom of note. She said her PCP told her she might be "blacking out" and falling. She is not sure if this is happening, but says she rarely remembers the act of falling. "

## 2018-01-23 NOTE — TELEPHONE ENCOUNTER
Returned call to pharmacist at [facility] and informed her Justin will be taking Baclofen in addition to her Tizanidine 8mg qhs and 4mg qam. She clarified the Tizaidine rx is written for 4 mg TID.

## 2018-01-23 NOTE — TELEPHONE ENCOUNTER
----- Message from Margarita Thurman sent at 1/23/2018  9:25 AM CST -----  Contact: [facility]- [phone]  [place]- pharmacy called with questions about the pts rx baclofen (LIORESAL) 10 MG tablet- says the pt has another script already for a muscle relaxer- wanted to know if she needs both- please call pharmacy back at [phone]

## 2018-01-23 NOTE — PROGRESS NOTES
"Subjective:       Patient ID: Justin Barnes is a 57 y.o. female who presents today for a routine clinic visit for MS. She was last seen in February 2017. The history has been provided by the patient.    MS HPI:  · DMT: Aubagio 14mg daily for about about 13 months.    · Side effects from DMT: She had some hair loss, but this has resolved.   · Taking vitamin D3 as recommended? Yes -  Dose: 5000 units during the week and 10,000 units on the weekends.   She continues to have a lot of diarrhea. She takes anti-diarrhea medications every day. She denies any current nausea. She has not seen gastroenterology. She denies any bleeding with bowel movements, but the stool is loose most of the time. She has not had a colonoscopy in "quite a while."    She is falling more. She falls more than once a month. She states that her PCP told her she may be "blacking out." She does not remember these falls when they happen. Her blood sugar has been running low. Her medication was adjusted, but the afternoon blood sugars are still low.       SOCIAL HISTORY  Social History   Substance Use Topics    Smoking status: Current Every Day Smoker     Packs/day: 0.50     Types: Cigarettes     Start date: 5/7/1973    Smokeless tobacco: Never Used    Alcohol use No     Living arrangements - the patient lives with her  in [place].  Employment: She is on disability.     MS ROS:  · Fatigue: Yes - Energy level is very low.   · Sleep Disturbance: Yes -She has trouble staying asleep. She gets about 4 hours of sleep at night. She snores. She has not had a sleep study and she does not want one. She thinks that the pain wakes her and keeps her up at night.    · Bladder Dysfunction: Yes -She denies any recent UTIs. She reports frequent infections last fall. No incontinence. She takes Sanctura.   · Bowel Dysfunction: As above, she has diarrhea and has some bowel incontinence.   · Spasticity: Yes - She has not had Baclofen Rx lately, so her " "spasms have not been controlled. She has "spasms everywhere."  She takes Tizanidine 8mg at night and 4mg in the morning.   · Visual Symptoms: Yes - She feels like her vision has changed. She is due to see her eye doctor in March.   · Cognitive: Yes - Memory is "not what I'd like it to be." She forgot what floor our office is on. Her memory issues do not interfere with daily function. She has trouble with word finding and forgets what she is talking about in the middle of conversations sometimes.   · Mood Disorder: Yes -She is not as depressed, but she gets frustrated by the limitations that pain forces on her. She states that her mood has been "pretty good." She still takes Effexor and Cymbalta.   · Gait Disturbance: Yes - Pain interferes with her walking. Her legs feel heavy. She has to take breaks when walking distances.   · Falls: She had a fall on 1/6/18.   · Hand Dysfunction: Yes - She drops things easily, mostly little things. She also has trouble doing buttons on her shirt.   · Pain: Yes - She has chronic back and neck pain pain that limits her activities. She also has arm pain and heaviness in legs.  She sees pain management. She just had an injection in her neck in December. She has another one scheduled. She did see an improvement. She has "bone chips" to the right shoulder. She also has an appointment with an orthopedist soon. She has headaches that stem from her neck pain.   · Sexual Dysfunction: Not Assessed  · Skin Breakdown: No  · Tremors: No.   · Dysphagia:  No  · Dysarthria:  No.   · Heat sensitivity:  No. She sweats easily.   · Any un-met adaptive needs? She is interested in toilet frame.   · Copay Assist?  $0 copay for Aubagio        Objective:        1. 25 foot timed walk: 6.1 seconds today without assist (antalgic gait); was 5.7 seconds at last visit without assist  Timed 25 Foot Walk: 10/3/2016   Did patient wear an AFO? No   Was assistive device used? No   Time for 25 Foot Walk (seconds) 4.98 "   Time for 25 Foot Walk (seconds) 5       Neurologic Exam     Mental Status   Oriented to person, place, and time.   Attention: normal.   Speech: speech is normal   Level of consciousness: alert  Knowledge: good.   Normal comprehension.     Cranial Nerves     CN III, IV, VI   Pupils are equal, round, and reactive to light.  Extraocular motions are normal.   Right pupil: Shape: regular. Reactivity: brisk.   Left pupil: Shape: regular. Reactivity: brisk.   CN III: no CN III palsy  CN VI: no CN VI palsy  Nystagmus: none     CN V   Facial sensation intact.     CN VII   Right facial weakness: none  Left facial weakness: none    CN VIII   Hearing: intact    CN IX, X   Palate: symmetric    CN XI   Right sternocleidomastoid strength: normal  Left sternocleidomastoid strength: normal  Right trapezius strength: normal  Left trapezius strength: normal    CN XII   Tongue deviation: none    Motor Exam     Strength   Right neck flexion: 5/5  Left neck flexion: 5/5  Right neck extension: 5/5  Left neck extension: 5/5  Right deltoid: 5/5  Left deltoid: 5/5  Right biceps: 5/5  Left biceps: 5/5  Right triceps: 5/5  Left triceps: 5/5  Right wrist flexion: 5/5  Left wrist flexion: 5/5  Right wrist extension: 5/5  Left wrist extension: 5/5  Right interossei: 5/5  Left interossei: 5/5  Right iliopsoas: 5/5  Left iliopsoas: 5/5  Right quadriceps: 5/5  Left quadriceps: 5/5  Right hamstrin/5  Left hamstrin/5  Right anterior tibial: 5/5  Left anterior tibial: 5/5  Right gastroc: 5/5  Left gastroc: 5/5    Sensory Exam   Right arm vibration: decreased from fingers  Left arm vibration: decreased from fingers  Right leg vibration: decreased from knee  Left leg vibration: decreased from knee    Gait, Coordination, and Reflexes     Gait  Gait: (antalgic and slightly wide-based)    Coordination   Romberg: positive  Finger to nose coordination: normal  Tandem walking coordination: abnormal (slightly off balance)    Tremor   Resting tremor:  absent    Reflexes   Right brachioradialis: 2+  Left brachioradialis: 2+  Right biceps: 2+  Left biceps: 2+  Right triceps: 2+  Left triceps: 2+  Right patellar: 2+  Left patellar: 2+  Right achilles: 2+  Left achilles: 2+  Right plantar: equivocal  Left plantar: equivocal  Normal rapid sequential movements in upper extremities.          Labs:   Vit D=78    Lab Results   Component Value Date    WBC 10.55 12/01/2017    HGB 12.5 12/01/2017    HCT 38.5 12/01/2017    MCV 92 12/01/2017     12/01/2017       Lab Results   Component Value Date    ALT 29 12/01/2017    AST 28 12/01/2017    ALKPHOS 86 12/01/2017    BILITOT 0.3 12/01/2017     Imaging:     Results for orders placed during the hospital encounter of 10/10/17   MRI Brain W WO Contrast    Narrative Examination:  MRI Brain without and with contrast.    Technique: Brain studied using sagittal and axial T1, axial T2, Flair, DWI and axial and coronal T1W images with gadolinium.    Clinical history:Multiple sclerosis    Findings:    Comparison 10/12/2016 MRI brain with contrast.    Again demonstrated are multiple demyelinating plaques in this patient with known MS.  No associated abnormal enhancement is seen at this time.  The ventricular system and cortical sulci are normal size.    Impression Overall grossly stable appearance of brain including demyelinating plaques again noted in this patient with known MS.  No associated abnormal enhancement is seen at this time.        Electronically signed by: Dr. DEVANG YAP  Date:     10/10/17  Time:    13:55      Results for orders placed during the hospital encounter of 10/10/17   MRI Cervical Spine W WO Cont    Narrative Technique: Cervical spine studied using sagittal and axial T1 and T2 W images.    Clinical history: Multiple sclerosis, neck pain, sensory disturbance    Findings:    Comparison MRI cervical spine 08/16/2016.    A minimal reversal of the mid cervical curvature again noted with anatomic alignment.   Degenerative changes of the discs including loss of height and desiccation also again noted at C4-5 and C5-6.    C3-4, moderate right facet arthropathy.    C4-5, bulging of the disc again noted with effacement of the adjacent ventral subarachnoid space.    C5-6, bulging of the disc again noted with superimposed small more focal right paracentral protrusion of the disc with complete effacement of ventral subarachnoid space and flattening of the ventral cord more so on the right, unchanged.  Overall mild degree of canal stenosis results.  Bilateral uncinate hypertrophy is present with moderate to severe bilateral foraminal stenosis.    C6-7, mild annular bulging slightly asymmetric to the left posteriorly.    C7-T1, small posterior midline/right prominent midline protrusion, unchanged.    Mild annular bulging also suspected T1-2.    The cervical cord maintains normal signal throughout.  No abnormal intra-dural enhancement is seen.    Impression No appreciable change as compared to the MRI cervical spine 10/12/2016.  No focal signal abnormality appreciated cervical cord and no abnormal intradural enhancement is appreciated.    Multilevel disc and/or facet degenerative changes as detailed for each level above and unchanged.            Electronically signed by: Dr. DEVANG YAP  Date:     10/10/17  Time:    14:04      Images reviewed with the patient.   Diagnosis/Assessment/Plan:    1. Multiple Sclerosis  · Assessment: Justin is stable from an MS perspective. Her timed walk is slightly slower, but I feel that this is due to pain more than worsening MS, as the remainder of her exam is stable. She is considering seeing a neurosurgeonfor her neck/spine if she does not get adequate relief with current pain management techniques. I would like to refer her to gastroenterology for evaluation for chronic diarrhea. I am also concerned that she might be fainting when she falls, as she does not recall actually falling, but says  that this only lasts for a few seconds. No one has witnessed her fall lately. I advised that she continue to check her blood sugar and blood pressure. She states that her blood sugars have been running low. She will follow up with her PCP about this to see if her medication needs to be adjusted.   · Imaging: Annual brain MRI due in October 2018.    · Disease Modifying Therapies: Continue Aubagio. She will need CBC and LFTs in March and every 3 months going forward. These have been ordered.  Continue Vitamin D. Will check Vitamin D with March labs, as well.   2. MS Symptom Assessment / Management  · Sleep: She is not interested in a sleep study.   · Spasticity: Continue taking Baclofen 10mg at bedtime and 5mg midday. Rx refilled today. She also takes tizanidine. Will discuss with Dr. Martinez.   · Mood Disorder: Continue Effexor and Cymbalta.   · Gait Disturbance: She has antalgic gait. She will let me know if she wants to pursue neurosurgery referral.   · Other: We discussed some smoking cessations programs that are more convenient for her at [facility] or [facility].     Over 50% of this 50 minute visit was spent in direct face to face counseling of the patient about MS and the management of her symptoms.The patient agrees with the plan of care. She will follow up with Dr. Martinez in 4-5 months.   There are no diagnoses linked to this encounter.

## 2018-01-24 ENCOUNTER — PATIENT MESSAGE (OUTPATIENT)
Dept: NEUROLOGY | Facility: CLINIC | Age: 58
End: 2018-01-24

## 2018-01-25 ENCOUNTER — DOCUMENTATION ONLY (OUTPATIENT)
Dept: NEUROLOGY | Facility: CLINIC | Age: 58
End: 2018-01-25

## 2018-01-26 DIAGNOSIS — M54.12 CERVICAL RADICULOPATHY: Primary | ICD-10-CM

## 2018-01-26 RX ORDER — SODIUM CHLORIDE, SODIUM LACTATE, POTASSIUM CHLORIDE, CALCIUM CHLORIDE 600; 310; 30; 20 MG/100ML; MG/100ML; MG/100ML; MG/100ML
INJECTION, SOLUTION INTRAVENOUS CONTINUOUS
Status: CANCELLED | OUTPATIENT
Start: 2018-02-09

## 2018-01-29 ENCOUNTER — PATIENT MESSAGE (OUTPATIENT)
Dept: NEUROLOGY | Facility: CLINIC | Age: 58
End: 2018-01-29

## 2018-02-06 ENCOUNTER — PATIENT MESSAGE (OUTPATIENT)
Dept: NEUROLOGY | Facility: CLINIC | Age: 58
End: 2018-02-06

## 2018-02-06 DIAGNOSIS — G35 MULTIPLE SCLEROSIS: Primary | ICD-10-CM

## 2018-02-06 DIAGNOSIS — R26.9 GAIT DISTURBANCE: ICD-10-CM

## 2018-02-07 NOTE — TELEPHONE ENCOUNTER
Faxed prior auth/medical necessity form to [facility] [phone] and a copy of these forms and orders/clincals to [facility] [phone], only contracted DME provider per  provider directory.

## 2018-02-08 DIAGNOSIS — M50.30 DDD (DEGENERATIVE DISC DISEASE), CERVICAL: Primary | ICD-10-CM

## 2018-02-09 ENCOUNTER — HOSPITAL ENCOUNTER (OUTPATIENT)
Dept: RADIOLOGY | Facility: HOSPITAL | Age: 58
Discharge: HOME | End: 2018-02-09
Attending: ANESTHESIOLOGY
Payer: MEDICARE

## 2018-02-09 ENCOUNTER — SURGERY (OUTPATIENT)
Age: 58
End: 2018-02-09

## 2018-02-09 ENCOUNTER — HOSPITAL ENCOUNTER (OUTPATIENT)
Facility: HOSPITAL | Age: 58
Discharge: HOME | End: 2018-02-09
Attending: ANESTHESIOLOGY | Admitting: ANESTHESIOLOGY
Payer: MEDICARE

## 2018-02-09 DIAGNOSIS — M50.30 DDD (DEGENERATIVE DISC DISEASE), CERVICAL: ICD-10-CM

## 2018-02-09 DIAGNOSIS — M54.12 CERVICAL RADICULOPATHY: Primary | ICD-10-CM

## 2018-02-09 LAB — GLUCOSE SERPL-MCNC: 97 MG/DL (ref 70–110)

## 2018-02-09 PROCEDURE — 25500020 PHARM REV CODE 255: Mod: PO | Performed by: ANESTHESIOLOGY

## 2018-02-09 PROCEDURE — 76000 FLUOROSCOPY <1 HR PHYS/QHP: CPT | Mod: TC,PO

## 2018-02-09 PROCEDURE — 25000003 PHARM REV CODE 250: Mod: PO | Performed by: ANESTHESIOLOGY

## 2018-02-09 PROCEDURE — 63600175 PHARM REV CODE 636 W HCPCS: Mod: PO | Performed by: ANESTHESIOLOGY

## 2018-02-09 PROCEDURE — 62321 NJX INTERLAMINAR CRV/THRC: CPT | Mod: ,,, | Performed by: ANESTHESIOLOGY

## 2018-02-09 PROCEDURE — 99152 MOD SED SAME PHYS/QHP 5/>YRS: CPT | Mod: ,,, | Performed by: ANESTHESIOLOGY

## 2018-02-09 PROCEDURE — 62321 NJX INTERLAMINAR CRV/THRC: CPT | Mod: PO | Performed by: ANESTHESIOLOGY

## 2018-02-09 RX ORDER — MIDAZOLAM HYDROCHLORIDE 1 MG/ML
INJECTION INTRAMUSCULAR; INTRAVENOUS
Status: DISCONTINUED | OUTPATIENT
Start: 2018-02-09 | End: 2018-02-09 | Stop reason: HOSPADM

## 2018-02-09 RX ORDER — LIDOCAINE HYDROCHLORIDE 10 MG/ML
INJECTION, SOLUTION EPIDURAL; INFILTRATION; INTRACAUDAL; PERINEURAL
Status: DISCONTINUED | OUTPATIENT
Start: 2018-02-09 | End: 2018-02-09 | Stop reason: HOSPADM

## 2018-02-09 RX ORDER — SODIUM CHLORIDE, SODIUM LACTATE, POTASSIUM CHLORIDE, CALCIUM CHLORIDE 600; 310; 30; 20 MG/100ML; MG/100ML; MG/100ML; MG/100ML
INJECTION, SOLUTION INTRAVENOUS CONTINUOUS
Status: DISCONTINUED | OUTPATIENT
Start: 2018-02-09 | End: 2018-02-09 | Stop reason: HOSPADM

## 2018-02-09 RX ORDER — METHYLPREDNISOLONE ACETATE 80 MG/ML
INJECTION, SUSPENSION INTRA-ARTICULAR; INTRALESIONAL; INTRAMUSCULAR; SOFT TISSUE
Status: DISCONTINUED | OUTPATIENT
Start: 2018-02-09 | End: 2018-02-09 | Stop reason: HOSPADM

## 2018-02-09 RX ORDER — LIDOCAINE HYDROCHLORIDE 10 MG/ML
1 INJECTION, SOLUTION EPIDURAL; INFILTRATION; INTRACAUDAL; PERINEURAL ONCE
Status: COMPLETED | OUTPATIENT
Start: 2018-02-09 | End: 2018-02-09

## 2018-02-09 RX ORDER — HYDROCODONE BITARTRATE AND ACETAMINOPHEN 10; 325 MG/1; MG/1
1 TABLET ORAL EVERY 6 HOURS PRN
Qty: 120 TABLET | Refills: 0 | Status: SHIPPED | OUTPATIENT
Start: 2018-02-09 | End: 2018-03-02 | Stop reason: SDUPTHER

## 2018-02-09 RX ORDER — OXYCODONE AND ACETAMINOPHEN 10; 325 MG/1; MG/1
1 TABLET ORAL EVERY 6 HOURS PRN
Qty: 120 TABLET | Refills: 0 | Status: SHIPPED | OUTPATIENT
Start: 2018-02-13 | End: 2018-03-15

## 2018-02-09 RX ADMIN — SODIUM CHLORIDE, SODIUM LACTATE, POTASSIUM CHLORIDE, CALCIUM CHLORIDE: 600; 310; 30; 20 INJECTION, SOLUTION INTRAVENOUS at 12:02

## 2018-02-09 RX ADMIN — METHYLPREDNISOLONE ACETATE 80 MG: 80 INJECTION, SUSPENSION INTRA-ARTICULAR; INTRALESIONAL; INTRAMUSCULAR; SOFT TISSUE at 01:02

## 2018-02-09 RX ADMIN — LIDOCAINE HYDROCHLORIDE 10 ML: 10 INJECTION, SOLUTION EPIDURAL; INFILTRATION; INTRACAUDAL; PERINEURAL at 01:02

## 2018-02-09 RX ADMIN — IOHEXOL 3 ML: 300 INJECTION, SOLUTION INTRAVENOUS at 01:02

## 2018-02-09 RX ADMIN — MIDAZOLAM HYDROCHLORIDE 2 MG: 1 INJECTION, SOLUTION INTRAMUSCULAR; INTRAVENOUS at 01:02

## 2018-02-09 RX ADMIN — LIDOCAINE HYDROCHLORIDE 20 MG: 10 INJECTION, SOLUTION EPIDURAL; INFILTRATION; INTRACAUDAL; PERINEURAL at 12:02

## 2018-02-09 NOTE — OP NOTE
PROCEDURE DATE: 2/9/2018    Procedure: C7-T1 cervical interlaminar epidural steroid injection under utilizing fluoroscopy.    Diagnosis: Cervical Radiculopathy    POSTOP DIAGNOSIS: SAME    Physician: Jeffry Seaman MD    Medications injected:  Methylprednisone 80mg followed by a slow injection of 4 mL sterile, preservative-free normal saline.    Local anesthetic used: Lidocaine 1%, 4 ml.    Sedation Medications: 4mg versed    Complications:  none    Estimated blood loss: none    Technique:  A time-out was taken to identify patient and procedure prior to starting the procedure.  With the patient laying in a prone position with the neck in a mid-flexed forward position, the area was prepped and draped in the usual sterile fashion using ChloraPrep and a fenestrated drape.  The area was determined under AP fluoroscopic guidance.  Local anesthetic was given using a 25-gauge 1.5 inch needle by raising a wheal and then infiltrating ventrally.  A 3.5 inch 20-gauge Touhy needle was introduced under fluoroscopic guidance to meet the lamina of C7.  The needle was then hinged under the lamina then advanced using loss of resistance technique.  Once the tip of the needle was in the desired position, the contrast dye Omnipaque was injected to determine placement and no uptake.  The steroid was then injected slowly followed by a slow injection of 4 mL of the sterile preservative-free normal saline.  The patient tolerated the procedure well.    The patient was monitored after the procedure and was given post-procedure and discharge instructions to follow at home. The patient was discharged in a stable condition.

## 2018-02-09 NOTE — DISCHARGE INSTRUCTIONS
Recovery After Procedural Sedation (Adult)  You have been given medicine by vein to make you sleep during your surgery. This may have included both a pain medicine and sleeping medicine. Most of the effects have worn off. But you may still have some drowsiness for the next 6 to 8 hours.  Home care  Follow these guidelines when you get home:  · For the next 8 hours, you should be watched by a responsible adult. This person should make sure your condition is not getting worse.  · Don't drink any alcohol for the next 24 hours.  · Don't drive, operate dangerous machinery, or make important business or personal decisions during the next 24 hours.  Note: Your healthcare provider may tell you not to take any medicine by mouth for pain or sleep in the next 4 hours. These medicines may react with the medicines you were given in the hospital. This could cause a much stronger response than usual.  Follow-up care  Follow up with your healthcare provider if you are not alert and back to your usual level of activity within 12 hours.  When to seek medical advice  Call your healthcare provider right away if any of these occur:  · Drowsiness gets worse  · Weakness or dizziness gets worse  · Repeated vomiting  · You can't be awakened   Date Last Reviewed: 10/18/2016  © 7412-1796 [employer]. [address] [place] [address], [place] [address]. All rights reserved. This information is not intended as a substitute for professional medical care. Always follow your healthcare professional's instructions.      Home care instructions  Apply ice pack to the injection site for 20 minutes periods for the first 24 hrs for soreness/discomfort at injection site DO NOT USE HEAT FOR 24 HOURS  Keep site clean and dry for 24 hours, remove bandaid when desired  Do not drive until tomorrow  Take care when DOING NORMAL ACTIVITY OR ANY ACTIVITY AFTER YOUR CERVICAL STEROID injection  Avoid strenuous activities for 2 days  Make take 2 weeks to feel  the full effects   Resume home medication as prescribed today  Resume Aspirin, Plavix, or Coumadin the day after the procedure unless otherwise instructed.    SEE IMMEDIATE MEDICAL HELP FOR:  Severe increase in your usual pain or appearance of new pain  Prolonged or increasing weakness or numbness in the legs or arms  Drainage, redness, active bleeding, or increased swelling at the injection site  Temperature over 100.0 degrees F.  Headache that increases when your head is upright and decreases when you lie flat    CALL 911 OR GO DIRECTLY TO EMERGENCY DEPARTMENT FOR:  Shortness of breath, chest pain, or problems breathing

## 2018-02-09 NOTE — H&P
CC: Neck pain    HPI: The patient is a 56yo woman with a history of cervical radiculopathy here for cervical ROBIN. There are no major changes in history and physical from 11/15/17.    Past Medical History:   Diagnosis Date    Anxiety 3/17/2014    Cervical cancer 1996    Combined hyperlipidemia associated with type 2 diabetes mellitus     Coronary artery disease, non-occlusive     DDD (degenerative disc disease), cervical 2/24/2014    DDD (degenerative disc disease), lumbar 2/24/2014    Diabetes mellitus, type 2     Hand arthritis     bilateral    Hypertension associated with diabetes     Lumbar postlaminectomy syndrome 2/24/2014    Multiple sclerosis 2/24/2014       Past Surgical History:   Procedure Laterality Date    BACK SURGERY      x2, both lumbar    CARDIAC CATHETERIZATION      no CAD    CARPAL TUNNEL RELEASE Left     COLONOSCOPY      COLONOSCOPY N/A 7/26/2016    Procedure: COLONOSCOPY;  Surgeon: Jefe Sow MD;  [facility];  Service: Endoscopy;  Laterality: N/A;    CYST REMOVAL      skin of back    Epidural Steroid injection      HYSTERECTOMY      with one ovary removed    OOPHORECTOMY      2nd ovary removed 1 year after Hysterectomy    OTHER SURGICAL HISTORY      cervical epidural injection    TUBAL LIGATION         Family History   Problem Relation Age of Onset    Breast cancer Neg Hx     Ovarian cancer Neg Hx        Social History     Social History    Marital status:      Spouse name: N/A    Number of children: N/A    Years of education: N/A     Social History Main Topics    Smoking status: Current Every Day Smoker     Packs/day: 0.50     Types: Cigarettes     Start date: 5/7/1973    Smokeless tobacco: Never Used    Alcohol use No    Drug use: Yes     Types: Hydrocodone    Sexual activity: No     Other Topics Concern    None     Social History Narrative    Was not raised by family, does not know history       Current Facility-Administered Medications  "  Medication Dose Route Frequency Provider Last Rate Last Dose    lactated ringers infusion   Intravenous Continuous Jeffry Seaman MD           Review of patient's allergies indicates:   Allergen Reactions    Amoxil [amoxicillin] Itching and Rash    Latex, natural rubber Hives and Swelling    Penicillins        Vitals:    02/09/18 1245 02/09/18 1252   BP:  (!) 179/85   Pulse:  82   Resp:  18   Temp:  97.9 °F (36.6 °C)   TempSrc:  Skin   Weight: 97.5 kg (215 lb)    Height: 5' 5" (1.651 m)        REVIEW OF SYSTEMS:     GENERAL: No weight loss, malaise or fevers.  HEENT:  No recent changes in vision or hearing  NECK: Negative for lumps, no difficulty with swallowing.  RESPIRATORY: Negative for cough, wheezing or shortness of breath, patient denies any recent URI.  CARDIOVASCULAR: Negative for chest pain, leg swelling or palpitations.  GI: Negative for abdominal discomfort, blood in stools or black stools or change in bowel habits.  MUSCULOSKELETAL: See HPI.  SKIN: Negative for lesions, rash, and itching.  PSYCH: No suicidal or homicidal ideations, no current mood disturbances.  HEMATOLOGY/LYMPHOLOGY: Negative for prolonged bleeding, bruising easily or swollen nodes. Patient is not currently taking any anti-coagulants  ENDO: No history of diabetes or thyroid dysfunction  NEURO: No history of syncope, paralysis, seizures or tremors.All other reviewed and negative other than HPI.    Physical exam:  Gen: A and O x3, pleasant, well-groomed  Skin: No rashes or obvious lesions  HEENT: PERRLA, no obvious deformities on ears or in canals. No thyroid masses, trachea midline, no palpable lymph nodes in neck, axilla.  CVS: Regular rate and rhythm, normal S1 and S2, no murmurs.  Resp: Clear to auscultation bilaterally.  Abdomen: Soft, NT/ND, normal bowel sounds present.  Musculoskeletal/Neuro: Moving all extremities    Assessment:  Cervical radiculopathy  -     Case Request Operating Room: " INJECTION-STEROID-EPIDURAL-CERVICAL  -     Activity as tolerated; Standing  -     Place in Outpatient; Standing  -     Diet NPO; Standing  -     lactated ringers infusion; Inject into the vein continuous.  -     Notify physician ; Standing  -     Notify physician ; Standing  -     Notify physician (specify); Standing  -     Place 18-22 gauage peripheral IV ; Standing  -     Verify informed consent; Standing  -     Vital signs; Standing

## 2018-02-09 NOTE — DISCHARGE SUMMARY
[facility]  Discharge Note  Short Stay    Admit Date: 2/9/2018    Discharge Date: 2/9/2018    Attending Physician: Jeffry Seaman MD     Discharge Provider: Jeffry Seaman    Diagnoses:  Active Hospital Problems    Diagnosis  POA    *Cervical radiculopathy [M54.12]  Yes      Resolved Hospital Problems    Diagnosis Date Resolved POA   No resolved problems to display.       Discharged Condition: good    Final Diagnoses: Cervical radiculopathy [M54.12]    Disposition: Home or Self Care    Hospital Course: no complications, uneventful    Outcome of Hospitalization, Treatment, Procedure, or Surgery:  Patient was admitted for outpatient procedure. The patient underwent procedure without complications and are discharged home    Follow up/Patient Instructions:  Follow up as scheduled/Patient has received instructions and follow up date    Medications:  Continue previous medications      Discharge Procedure Orders  Call MD for:  temperature >100.4     Call MD for:  severe uncontrolled pain     Call MD for:  redness, tenderness, or signs of infection (pain, swelling, redness, odor or green/yellow discharge around incision site)     Call MD for:  severe persistent headache     No dressing needed           Discharge Procedure Orders (must include Diet, Follow-up, Activity):    Discharge Procedure Orders (must include Diet, Follow-up, Activity)  Call MD for:  temperature >100.4     Call MD for:  severe uncontrolled pain     Call MD for:  redness, tenderness, or signs of infection (pain, swelling, redness, odor or green/yellow discharge around incision site)     Call MD for:  severe persistent headache     No dressing needed

## 2018-02-10 ENCOUNTER — PATIENT MESSAGE (OUTPATIENT)
Dept: PAIN MEDICINE | Facility: CLINIC | Age: 58
End: 2018-02-10

## 2018-02-12 ENCOUNTER — PATIENT MESSAGE (OUTPATIENT)
Dept: PAIN MEDICINE | Facility: CLINIC | Age: 58
End: 2018-02-12

## 2018-02-12 VITALS
DIASTOLIC BLOOD PRESSURE: 74 MMHG | TEMPERATURE: 98 F | SYSTOLIC BLOOD PRESSURE: 163 MMHG | WEIGHT: 215 LBS | HEART RATE: 88 BPM | HEIGHT: 65 IN | BODY MASS INDEX: 35.82 KG/M2 | RESPIRATION RATE: 15 BRPM | OXYGEN SATURATION: 96 %

## 2018-02-19 ENCOUNTER — PATIENT MESSAGE (OUTPATIENT)
Dept: NEUROLOGY | Facility: CLINIC | Age: 58
End: 2018-02-19

## 2018-02-22 NOTE — TELEPHONE ENCOUNTER
Phoned pt to check on status of medical equipment; she was at the doctor with her .  I will call her tomorrow.

## 2018-03-01 ENCOUNTER — PATIENT MESSAGE (OUTPATIENT)
Dept: PAIN MEDICINE | Facility: CLINIC | Age: 58
End: 2018-03-01

## 2018-03-02 ENCOUNTER — PATIENT MESSAGE (OUTPATIENT)
Dept: PAIN MEDICINE | Facility: CLINIC | Age: 58
End: 2018-03-02

## 2018-03-04 RX ORDER — HYDROCODONE BITARTRATE AND ACETAMINOPHEN 10; 325 MG/1; MG/1
1 TABLET ORAL EVERY 6 HOURS PRN
Qty: 120 TABLET | Refills: 0 | Status: SHIPPED | OUTPATIENT
Start: 2018-03-11 | End: 2018-03-15 | Stop reason: SDUPTHER

## 2018-03-15 ENCOUNTER — OFFICE VISIT (OUTPATIENT)
Dept: PAIN MEDICINE | Facility: CLINIC | Age: 58
End: 2018-03-15
Payer: MEDICARE

## 2018-03-15 ENCOUNTER — HOSPITAL ENCOUNTER (OUTPATIENT)
Dept: RADIOLOGY | Facility: HOSPITAL | Age: 58
Discharge: HOME | End: 2018-03-15
Attending: PHYSICIAN ASSISTANT
Payer: MEDICARE

## 2018-03-15 ENCOUNTER — TELEPHONE (OUTPATIENT)
Dept: PAIN MEDICINE | Facility: CLINIC | Age: 58
End: 2018-03-15

## 2018-03-15 VITALS
DIASTOLIC BLOOD PRESSURE: 93 MMHG | OXYGEN SATURATION: 93 % | SYSTOLIC BLOOD PRESSURE: 164 MMHG | BODY MASS INDEX: 35.75 KG/M2 | HEART RATE: 104 BPM | RESPIRATION RATE: 20 BRPM | TEMPERATURE: 99 F | WEIGHT: 214.81 LBS

## 2018-03-15 DIAGNOSIS — M48.02 CERVICAL STENOSIS OF SPINAL CANAL: ICD-10-CM

## 2018-03-15 DIAGNOSIS — Z79.891 OPIOID CONTRACT EXISTS: ICD-10-CM

## 2018-03-15 DIAGNOSIS — R60.9 EDEMA, UNSPECIFIED TYPE: ICD-10-CM

## 2018-03-15 DIAGNOSIS — M54.12 CERVICAL RADICULOPATHY: Primary | ICD-10-CM

## 2018-03-15 DIAGNOSIS — M51.36 DDD (DEGENERATIVE DISC DISEASE), LUMBAR: ICD-10-CM

## 2018-03-15 PROCEDURE — 93971 EXTREMITY STUDY: CPT | Mod: 26,,, | Performed by: RADIOLOGY

## 2018-03-15 PROCEDURE — 99213 OFFICE O/P EST LOW 20 MIN: CPT | Mod: S$GLB,,, | Performed by: PHYSICIAN ASSISTANT

## 2018-03-15 PROCEDURE — 99999 PR PBB SHADOW E&M-EST. PATIENT-LVL V: CPT | Mod: PBBFAC,,, | Performed by: PHYSICIAN ASSISTANT

## 2018-03-15 PROCEDURE — 3080F DIAST BP >= 90 MM HG: CPT | Mod: CPTII,S$GLB,, | Performed by: PHYSICIAN ASSISTANT

## 2018-03-15 PROCEDURE — 3077F SYST BP >= 140 MM HG: CPT | Mod: CPTII,S$GLB,, | Performed by: PHYSICIAN ASSISTANT

## 2018-03-15 PROCEDURE — 93971 EXTREMITY STUDY: CPT | Mod: TC,PO

## 2018-03-15 PROCEDURE — 99499 UNLISTED E&M SERVICE: CPT | Mod: S$GLB,,, | Performed by: PHYSICIAN ASSISTANT

## 2018-03-15 RX ORDER — HYDROCODONE BITARTRATE AND ACETAMINOPHEN 10; 325 MG/1; MG/1
1 TABLET ORAL EVERY 6 HOURS PRN
Qty: 120 TABLET | Refills: 0 | Status: SHIPPED | OUTPATIENT
Start: 2018-05-13 | End: 2018-06-12

## 2018-03-15 RX ORDER — HYDROCODONE BITARTRATE AND ACETAMINOPHEN 10; 325 MG/1; MG/1
1 TABLET ORAL EVERY 6 HOURS PRN
Qty: 120 TABLET | Refills: 0 | Status: SHIPPED | OUTPATIENT
Start: 2018-06-12 | End: 2018-06-22 | Stop reason: SDUPTHER

## 2018-03-15 RX ORDER — PREGABALIN 150 MG/1
150 CAPSULE ORAL 3 TIMES DAILY
Qty: 90 CAPSULE | Refills: 2 | Status: SHIPPED | OUTPATIENT
Start: 2018-03-15 | End: 2018-06-13 | Stop reason: SDUPTHER

## 2018-03-15 RX ORDER — HYDROCODONE BITARTRATE AND ACETAMINOPHEN 10; 325 MG/1; MG/1
1 TABLET ORAL EVERY 6 HOURS PRN
Qty: 120 TABLET | Refills: 0 | Status: SHIPPED | OUTPATIENT
Start: 2018-04-13 | End: 2018-05-13

## 2018-03-15 NOTE — TELEPHONE ENCOUNTER
Called patient and gave her ultrasound results and advised her to make an appointment with her PCP.

## 2018-03-15 NOTE — TELEPHONE ENCOUNTER
Please let the patient know that she does not have a DVT and to discuss her lower extremity edema with her primary care physician.

## 2018-03-16 ENCOUNTER — PATIENT MESSAGE (OUTPATIENT)
Dept: PAIN MEDICINE | Facility: CLINIC | Age: 58
End: 2018-03-16

## 2018-03-16 ENCOUNTER — TELEPHONE (OUTPATIENT)
Dept: PAIN MEDICINE | Facility: CLINIC | Age: 58
End: 2018-03-16

## 2018-03-16 NOTE — TELEPHONE ENCOUNTER
----- Message from Julia Bogan sent at 3/16/2018 11:29 AM CDT -----  Please fax ultrasound results to Dr Miles office at [phone].   Any questions call Kerline at [phone].

## 2018-03-18 NOTE — PROGRESS NOTES
This note was completed with dictation software and grammatical errors may exist.    CC: Neck and bilateral arm pain     HPI: The patient is a 58-year-old woman with a history of hypertension, diabetes, multiple sclerosis, lumbar postlaminectomy syndrome who presents in self-referral for continued low back pain and bilateral leg pain, neck pain. She is status post C7-T1 cervical interlaminar epidural steroid injection on 12/22/17 and again on 2/9/18 with 90% relief of her neck and arm pain.  She has some mild neck and mild low back pain but reports some pain throughout her left lower leg due to edema for the past couple days.  She complains of weakness in her hands.  She denies numbness, bladder or bowel incontinence.  She is currently doing physical therapy.  She is actually requesting to increase her pain medication even though she reports 90% relief after injections.    Pain intervention history: She has been followed by one of our previous [facility] pain management physicians, Dr. Chapa in [place] until recently.  She had undergone fusion surgery in 1983 and again surgery in 1993 for her lumbar spine.  She had undergone numerous lumbar spine injections in the past with good relief.  She has tried gabapentin with only minimal relief but states that Lyrica works best.  Sounds like she had a spinal cord stimulator trial in the past that did not help.  She has been taking hydrocodone up to 4 times a day for several years.  She is status post caudal epidural steroid injection on 8/6/14 with 60% relief.  She is status post caudal epidural steroid injection on 5/13/15 with 0% relief.  She is status post caudal epidural steroid injection on 10/2/15 with 0% relief.  She is status post C7-T1 cervical interlaminar epidural steroid injection on 9/7/16 with 100% relief of her bilateral arm pain and mild relief of her neck pain.  She is status post C7-T1 cervical interlaminar epidural steroid injection on 2/21/17 with at  least 50% relief.  She is status post bilateral L1 transforaminal epidural steroid injections on 3/20/17 with 75% relief. She is status post C7-T1 cervical interlaminar epidural steroid injection on 12/22/17 and again on 2/9/18 with 90% relief of her neck and arm pain.     ROS:She reports urinary frequency, joint stiffness, joint swelling, back pain, memory loss, anxiety, depression, difficulty sleeping and loss of balance.  Balance of review of systems is negative.    Medical, surgical, family and social history reviewed elsewhere in record.    Medications/Allergies: See med card    Vitals:    03/15/18 1028   BP: (!) 164/93   Pulse: 104   Resp: 20   Temp: 98.6 °F (37 °C)   TempSrc: Oral   SpO2: (!) 93%   Weight: 97.4 kg (214 lb 13.4 oz)   PainSc:   6   PainLoc: Leg         Physical exam:  Gen: A and O x3, pleasant, well-groomed  Skin: No rashes or obvious lesions  HEENT: PERRLA  CVS: Regular rate and rhythm, normal S1 and S2, no murmurs.  Mild right greater than left lower extremity edema.  She has mild tenderness to palpation to the right calf.    Resp: Clear to auscultation bilaterally, no wheezes or rales.  Abdomen: Soft, NT/ND, normal bowel sounds present.  Musculoskeletal: Antalgic gait, slow cautious, kyphotic stance.      Neuro:  Upper extremities: 5/5 strength bilaterally  Lower extremities: 5/5 strength bilaterally  Reflexes: Brachioradialis 2+, Bicep 2+, Tricep 0+. Patellar 0+, Achilles 0+ bilaterally.  Sensory: Intact and symmetrical to light touch and pinprick in C2-T1 dermatomes bilaterally.Intact and symmetrical to light touch and pinprick in L2-S1 dermatomes bilaterally.    Cervical range of motion m mildly reduced with flexion, extension and lateral rotation without increased pain.  Spurling's test positive for bilateral neck and shoulder pain  Myofascial exam: No tenderness palpation to the cervical paraspinous muscles.      Imaging:  MRI C-spine 10/10/17  Comparison MRI cervical spine  08/16/2016.  A minimal reversal of the mid cervical curvature again noted with anatomic alignment.  Degenerative changes of the discs including loss of height and desiccation also again noted at C4-5 and C5-6.  C3-4, moderate right facet arthropathy.  C4-5, bulging of the disc again noted with effacement of the adjacent ventral subarachnoid space.  C5-6, bulging of the disc again noted with superimposed small more focal right paracentral protrusion of the disc with complete effacement of ventral subarachnoid space and flattening of the ventral cord more so on the right, unchanged.  Overall mild degree of canal stenosis results.  Bilateral uncinate hypertrophy is present with moderate to severe bilateral foraminal stenosis.  C6-7, mild annular bulging slightly asymmetric to the left posteriorly.  C7-T1, small posterior midline/right prominent midline protrusion, unchanged.  Mild annular bulging also suspected T1-2.  The cervical cord maintains normal signal throughout.  No abnormal intra-dural enhancement is seen.    MRI lumbar spine 2/1/17  There are postoperative changes of posterior instrumented fusion at L2-S1.  There is a grade I anterolisthesis of L5 on S1.  The lumbar vertebral bodies show normal height without evidence of acute compression fracture or pathologic marrow replacement process.  There is a levoconvex curvature of the lumbar spine. There is degenerative desiccation, disc space narrowing, and degenerative endplate change at T11-T12, T12-L1, L1-L2, and L5-S1.  There is near complete fusion of the L2, L3, L4, and L5 vertebral bodies.  There are probable postoperative changes of posterior decompression at L2-S1. The conus medullaris terminates at L1. The visualized retroperitoneal/abdominal soft tissue  structures are unremarkable.  T11-T12: There is a broad disc bulge which flattens the ventral aspect of the conus.  There is ligamentum flavum thickening and facet arthropathy.  There is moderate  bilateral neuroforaminal stenosis present.  There is mild-moderate central canal stenosis.  T12-L1: There is a broad disc bulge with a superimposed ascending right paracentral disc extrusion which extends approximately 10 mm above the disc plane.  There is mild-moderate central canal stenosis present.  There is mild right neuroforaminal stenosis.  L1-L2: There is a broad disc bulge throughout the period there is ligamentum flavum thickening.  The central canal is obscured.  There is a suggestion of a possible right paracentral disc protrusion.  There is mild-moderate overall central canal stenosis.  The neural foramina are not assessed secondary to metal artifact.  L2-L3: No central canal or neuroforaminal stenosis. No disc protrusion or extrusion.  L3-L4: No central canal or neuroforaminal stenosis. No disc protrusion or extrusion.  L4-L5: The left neural foramen is not optimally evaluated due to metal artifact.  No central canal stenosis or right neuroforaminal stenosis.  L5-S1: There is a grade I anterolisthesis of L5 on S1 with associated uncovering of the intervertebral disc.  No definite central canal stenosis or neuroforaminal stenosis.      Assessment:  The patient is a 58-year-old woman with a history of hypertension, diabetes, multiple sclerosis, lumbar postlaminectomy syndrome who presents in self-referral for continued low back pain and bilateral leg pain, neck pain.   1. Cervical radiculopathy     2. Cervical stenosis of spinal canal     3. DDD (degenerative disc disease), lumbar     4. Opioid contract exists     5. Edema, unspecified type  US Lower Extremity Veins Left                                                                                                                                                                                               Plan:  1.  Dr. Seaman provided prescriptions for hydrocodone-acetaminophen 10/325 mg up to 4 times a day as needed for pain.  The patient asked to  increase her medication but I recommended against increasing opioids especially since she is improving after the injections.  2.  For her left lower leg edema, I have ordered a venous ultrasound to rule out DVT which is negative.  I advised her to discuss her edema with her primary care physician.  3.  Follow-up in 3 months or as needed.

## 2018-05-21 DIAGNOSIS — G35 MULTIPLE SCLEROSIS: ICD-10-CM

## 2018-05-21 DIAGNOSIS — Z79.899 OTHER LONG TERM (CURRENT) DRUG THERAPY: ICD-10-CM

## 2018-05-21 DIAGNOSIS — G35 MULTIPLE SCLEROSIS: Primary | ICD-10-CM

## 2018-05-31 ENCOUNTER — PATIENT MESSAGE (OUTPATIENT)
Dept: NEUROLOGY | Facility: CLINIC | Age: 58
End: 2018-05-31

## 2018-06-04 ENCOUNTER — PATIENT MESSAGE (OUTPATIENT)
Dept: PAIN MEDICINE | Facility: CLINIC | Age: 58
End: 2018-06-04

## 2018-06-06 DIAGNOSIS — M62.838 MUSCLE SPASM: ICD-10-CM

## 2018-06-07 RX ORDER — DULOXETIN HYDROCHLORIDE 60 MG/1
CAPSULE, DELAYED RELEASE ORAL
Qty: 90 CAPSULE | Refills: 3 | OUTPATIENT
Start: 2018-06-07

## 2018-06-13 NOTE — TELEPHONE ENCOUNTER
----- Message from RT Estela sent at 6/13/2018  3:09 PM CDT -----  Contact: Ghada,[phone], [facility]  Ghada,[phone], [facility], requesting medication refill: earlene Koehler.

## 2018-06-14 ENCOUNTER — TELEPHONE (OUTPATIENT)
Dept: PAIN MEDICINE | Facility: CLINIC | Age: 58
End: 2018-06-14

## 2018-06-14 RX ORDER — PREGABALIN 150 MG/1
150 CAPSULE ORAL 3 TIMES DAILY
Qty: 90 CAPSULE | Refills: 2 | Status: SHIPPED | OUTPATIENT
Start: 2018-06-14 | End: 2018-06-14

## 2018-06-14 NOTE — TELEPHONE ENCOUNTER
Spoke with the pharmacist. Lyrica 150 mg was called into the pharmacy but the patient has been on the 100 mg because the 150 mg was too strong. RX changed to Lyrica 100 mg.

## 2018-06-14 NOTE — TELEPHONE ENCOUNTER
----- Message from Sobeida Lara sent at 6/14/2018  3:15 PM CDT -----  Contact: [facility], Ghada Urrutia want to speak with a nurse regarding doctor BLACK number and questions about rx you call in please call back at       [id number] - [place] - [address] [place]  [address] [address]  Phone: [phone] Fax: [phone]

## 2018-06-22 RX ORDER — HYDROCODONE BITARTRATE AND ACETAMINOPHEN 10; 325 MG/1; MG/1
1 TABLET ORAL EVERY 6 HOURS PRN
Qty: 120 TABLET | Refills: 0 | Status: SHIPPED | OUTPATIENT
Start: 2018-08-11 | End: 2018-09-10

## 2018-06-22 RX ORDER — HYDROCODONE BITARTRATE AND ACETAMINOPHEN 10; 325 MG/1; MG/1
1 TABLET ORAL EVERY 6 HOURS PRN
Qty: 120 TABLET | Refills: 0 | Status: SHIPPED | OUTPATIENT
Start: 2018-09-10 | End: 2018-10-03 | Stop reason: SDUPTHER

## 2018-06-22 RX ORDER — HYDROCODONE BITARTRATE AND ACETAMINOPHEN 10; 325 MG/1; MG/1
1 TABLET ORAL EVERY 6 HOURS PRN
Qty: 120 TABLET | Refills: 0 | Status: SHIPPED | OUTPATIENT
Start: 2018-07-12 | End: 2018-08-11

## 2018-06-25 ENCOUNTER — PATIENT MESSAGE (OUTPATIENT)
Dept: PAIN MEDICINE | Facility: CLINIC | Age: 58
End: 2018-06-25

## 2018-06-27 RX ORDER — TERIFLUNOMIDE 14 MG/1
TABLET, FILM COATED ORAL
Qty: 84 TABLET | Refills: 0 | OUTPATIENT
Start: 2018-06-27

## 2018-07-10 ENCOUNTER — DOCUMENTATION ONLY (OUTPATIENT)
Dept: NEUROLOGY | Facility: CLINIC | Age: 58
End: 2018-07-10

## 2018-07-10 ENCOUNTER — PATIENT MESSAGE (OUTPATIENT)
Dept: PAIN MEDICINE | Facility: CLINIC | Age: 58
End: 2018-07-10

## 2018-07-10 NOTE — PROGRESS NOTES
Faxed lab orders per pt request to : [facility]     [phone]-fax  [phone]-phone

## 2018-07-13 ENCOUNTER — OFFICE VISIT (OUTPATIENT)
Dept: PAIN MEDICINE | Facility: CLINIC | Age: 58
End: 2018-07-13
Payer: MEDICARE

## 2018-07-13 VITALS
DIASTOLIC BLOOD PRESSURE: 72 MMHG | BODY MASS INDEX: 34.47 KG/M2 | TEMPERATURE: 97 F | WEIGHT: 207.13 LBS | HEART RATE: 74 BPM | RESPIRATION RATE: 20 BRPM | SYSTOLIC BLOOD PRESSURE: 142 MMHG | OXYGEN SATURATION: 95 %

## 2018-07-13 DIAGNOSIS — M48.02 CERVICAL STENOSIS OF SPINAL CANAL: ICD-10-CM

## 2018-07-13 DIAGNOSIS — M25.562 LEFT KNEE PAIN, UNSPECIFIED CHRONICITY: ICD-10-CM

## 2018-07-13 DIAGNOSIS — Z79.891 OPIOID CONTRACT EXISTS: ICD-10-CM

## 2018-07-13 DIAGNOSIS — M54.12 CERVICAL RADICULOPATHY: ICD-10-CM

## 2018-07-13 DIAGNOSIS — M96.1 POSTLAMINECTOMY SYNDROME OF LUMBAR REGION: ICD-10-CM

## 2018-07-13 DIAGNOSIS — G35 MULTIPLE SCLEROSIS: ICD-10-CM

## 2018-07-13 DIAGNOSIS — M54.16 LUMBAR RADICULOPATHY: Primary | ICD-10-CM

## 2018-07-13 PROCEDURE — 3078F DIAST BP <80 MM HG: CPT | Mod: CPTII,S$GLB,, | Performed by: PHYSICIAN ASSISTANT

## 2018-07-13 PROCEDURE — 3008F BODY MASS INDEX DOCD: CPT | Mod: CPTII,S$GLB,, | Performed by: PHYSICIAN ASSISTANT

## 2018-07-13 PROCEDURE — 99499 UNLISTED E&M SERVICE: CPT | Mod: S$GLB,,, | Performed by: PHYSICIAN ASSISTANT

## 2018-07-13 PROCEDURE — 99999 PR PBB SHADOW E&M-EST. PATIENT-LVL V: CPT | Mod: PBBFAC,,, | Performed by: PHYSICIAN ASSISTANT

## 2018-07-13 PROCEDURE — 3077F SYST BP >= 140 MM HG: CPT | Mod: CPTII,S$GLB,, | Performed by: PHYSICIAN ASSISTANT

## 2018-07-13 PROCEDURE — 99214 OFFICE O/P EST MOD 30 MIN: CPT | Mod: S$GLB,,, | Performed by: PHYSICIAN ASSISTANT

## 2018-07-13 RX ORDER — ALPRAZOLAM 0.5 MG/1
1 TABLET, ORALLY DISINTEGRATING ORAL ONCE AS NEEDED
Status: CANCELLED | OUTPATIENT
Start: 2018-08-08 | End: 2018-08-08

## 2018-07-13 RX ORDER — SODIUM CHLORIDE, SODIUM LACTATE, POTASSIUM CHLORIDE, CALCIUM CHLORIDE 600; 310; 30; 20 MG/100ML; MG/100ML; MG/100ML; MG/100ML
INJECTION, SOLUTION INTRAVENOUS CONTINUOUS
Status: CANCELLED | OUTPATIENT
Start: 2018-09-05

## 2018-07-13 NOTE — PROGRESS NOTES
This note was completed with dictation software and grammatical errors may exist.    CC: Neck and bilateral arm pain, back pain, knee pain     HPI: The patient is a 58-year-old woman with a history of hypertension, diabetes, multiple sclerosis, lumbar postlaminectomy syndrome who presents in self-referral for continued low back pain and bilateral leg pain, neck pain. She returns in follow-up today with multiple pain complaints.  Her neck pain has returned and is radiating to both arms.  She also reports a return of her pain in the upper lumbar region but her main complaint is left knee pain.  She saw an orthopedic surgeon in her hometown that told her she needed a knee replacement but she does not want to see him anymore.  She would like to see an orthopedic surgeon at [facility].  Her pain is worse with walking, activity, are mildly improved with medication.  She is again requesting to increase her opioids.  She reports weakness in her left foot and hands.  She reports numbness in her toes.  She reports occasional incontinence.    Pain intervention history: She has been followed by one of our previous [facility] pain management physicians, Dr. Chapa in [place] until recently.  She had undergone fusion surgery in 1983 and again surgery in 1993 for her lumbar spine.  She had undergone numerous lumbar spine injections in the past with good relief.  She has tried gabapentin with only minimal relief but states that Lyrica works best.  Sounds like she had a spinal cord stimulator trial in the past that did not help.  She has been taking hydrocodone up to 4 times a day for several years.  She is status post caudal epidural steroid injection on 8/6/14 with 60% relief.  She is status post caudal epidural steroid injection on 5/13/15 with 0% relief.  She is status post caudal epidural steroid injection on 10/2/15 with 0% relief.  She is status post C7-T1 cervical interlaminar epidural steroid injection on 9/7/16 with  100% relief of her bilateral arm pain and mild relief of her neck pain.  She is status post C7-T1 cervical interlaminar epidural steroid injection on 2/21/17 with at least 50% relief.  She is status post bilateral L1 transforaminal epidural steroid injections on 3/20/17 with 75% relief. She is status post C7-T1 cervical interlaminar epidural steroid injection on 12/22/17 and again on 2/9/18 with 90% relief of her neck and arm pain.     ROS:She reports urinary frequency, joint stiffness, joint swelling, back pain, memory loss, anxiety, depression, difficulty sleeping and loss of balance.  Balance of review of systems is negative.    Medical, surgical, family and social history reviewed elsewhere in record.    Medications/Allergies: See med card    Vitals:    07/13/18 0818   BP: (!) 142/72   Pulse: 74   Resp: 20   Temp: 97.4 °F (36.3 °C)   TempSrc: Oral   SpO2: 95%   Weight: 93.9 kg (207 lb 2 oz)   PainSc:   6   PainLoc: Neck         Physical exam:  Gen: A and O x3, pleasant, well-groomed  Skin: No rashes or obvious lesions  HEENT: PERRLA  CVS: Regular rate and rhythm, normal S1 and S2, no murmurs.    Resp: Clear to auscultation bilaterally, no wheezes or rales.  Abdomen: Soft, NT/ND, normal bowel sounds present.  Musculoskeletal: Antalgic gait, slow cautious, kyphotic stance.      Neuro:  Upper extremities: 5/5 strength bilaterally, except for 4/5  strength  Lower extremities: 5/5 strength bilaterally, except for 4/5 left foot dorsiflexion  Reflexes: Brachioradialis 0+, Bicep 1+, Tricep 0+. Patellar 0+, Achilles 0+ bilaterally.  Sensory: Intact and symmetrical to light touch and pinprick in C2-T1 dermatomes bilaterally.Intact and symmetrical to light touch and pinprick in L2-S1 dermatomes bilaterally.    Cervical spine:   Range of motion mildly reduced with flexion, extension and lateral rotation without increased pain.  Spurling's test positive for bilateral neck and shoulder pain  Myofascial exam: Mild  tenderness palpation to the cervical paraspinous muscles.    Lumbar spine:  Lumbar spine: ROM is Severely limited with flexion and extension with increased upper lumbar region pain during each maneuver.  Jeff's test is deferred due to left knee pain.  Supine straight leg raise is negative bilaterally.    Internal and external rotation of the hip causes no increased pain on either side.  Myofascial exam: mild tenderness to palpation across the upper lumbar paraspinous muscles.      Imaging:  MRI C-spine 10/10/17  Comparison MRI cervical spine 08/16/2016.  A minimal reversal of the mid cervical curvature again noted with anatomic alignment.  Degenerative changes of the discs including loss of height and desiccation also again noted at C4-5 and C5-6.  C3-4, moderate right facet arthropathy.  C4-5, bulging of the disc again noted with effacement of the adjacent ventral subarachnoid space.  C5-6, bulging of the disc again noted with superimposed small more focal right paracentral protrusion of the disc with complete effacement of ventral subarachnoid space and flattening of the ventral cord more so on the right, unchanged.  Overall mild degree of canal stenosis results.  Bilateral uncinate hypertrophy is present with moderate to severe bilateral foraminal stenosis.  C6-7, mild annular bulging slightly asymmetric to the left posteriorly.  C7-T1, small posterior midline/right prominent midline protrusion, unchanged.  Mild annular bulging also suspected T1-2.  The cervical cord maintains normal signal throughout.  No abnormal intra-dural enhancement is seen.    MRI lumbar spine 2/1/17  There are postoperative changes of posterior instrumented fusion at L2-S1.  There is a grade I anterolisthesis of L5 on S1.  The lumbar vertebral bodies show normal height without evidence of acute compression fracture or pathologic marrow replacement process.  There is a levoconvex curvature of the lumbar spine. There is degenerative  desiccation, disc space narrowing, and degenerative endplate change at T11-T12, T12-L1, L1-L2, and L5-S1.  There is near complete fusion of the L2, L3, L4, and L5 vertebral bodies.  There are probable postoperative changes of posterior decompression at L2-S1. The conus medullaris terminates at L1. The visualized retroperitoneal/abdominal soft tissue  structures are unremarkable.  T11-T12: There is a broad disc bulge which flattens the ventral aspect of the conus.  There is ligamentum flavum thickening and facet arthropathy.  There is moderate bilateral neuroforaminal stenosis present.  There is mild-moderate central canal stenosis.  T12-L1: There is a broad disc bulge with a superimposed ascending right paracentral disc extrusion which extends approximately 10 mm above the disc plane.  There is mild-moderate central canal stenosis present.  There is mild right neuroforaminal stenosis.  L1-L2: There is a broad disc bulge throughout the period there is ligamentum flavum thickening.  The central canal is obscured.  There is a suggestion of a possible right paracentral disc protrusion.  There is mild-moderate overall central canal stenosis.  The neural foramina are not assessed secondary to metal artifact.  L2-L3: No central canal or neuroforaminal stenosis. No disc protrusion or extrusion.  L3-L4: No central canal or neuroforaminal stenosis. No disc protrusion or extrusion.  L4-L5: The left neural foramen is not optimally evaluated due to metal artifact.  No central canal stenosis or right neuroforaminal stenosis.  L5-S1: There is a grade I anterolisthesis of L5 on S1 with associated uncovering of the intervertebral disc.  No definite central canal stenosis or neuroforaminal stenosis.      Assessment:  The patient is a 58-year-old woman with a history of hypertension, diabetes, multiple sclerosis, lumbar postlaminectomy syndrome who presents in self-referral for continued low back pain and bilateral leg pain, neck  pain.   1. Lumbar radiculopathy     2. Postlaminectomy syndrome of lumbar region     3. Cervical radiculopathy     4. Cervical stenosis of spinal canal     5. Left knee pain, unspecified chronicity  Ambulatory referral to Orthopedics   6. Multiple sclerosis     7. Opioid contract exists                                                                                                                                                                                                 Plan:  1.  For her back pain, I will set her up to repeat bilateral L1/2 transforaminal epidural steroid injections.  She did very well with this over one year ago.  4 weeks later we will schedule a cervical ROBIN for her neck and bilateral arm pain.  2.  I placed a referral to orthopedics for her left knee pain.  She would like to see someone at [facility] and will make this appointment.  3.  She again asked to increase her opioids and we have recommended against this.  Dr. Seaman provided prescriptions for hydrocodone-acetaminophen 10/325 mg up to 4 times a day as needed for pain.  4.  Follow-up in 4 weeks after the cervical ROBIN or sooner as needed.    Greater than 50% of this 25 minute visit was spent on counseling the patient.

## 2018-07-14 ENCOUNTER — PATIENT MESSAGE (OUTPATIENT)
Dept: PAIN MEDICINE | Facility: CLINIC | Age: 58
End: 2018-07-14

## 2018-07-16 RX ORDER — HYDROCHLOROTHIAZIDE 25 MG/1
25 TABLET ORAL DAILY
COMMUNITY
End: 2021-11-10 | Stop reason: CLARIF

## 2018-07-16 RX ORDER — METOPROLOL TARTRATE 50 MG/1
50 TABLET ORAL 2 TIMES DAILY
COMMUNITY

## 2018-07-19 ENCOUNTER — PATIENT MESSAGE (OUTPATIENT)
Dept: PAIN MEDICINE | Facility: CLINIC | Age: 58
End: 2018-07-19

## 2018-07-19 NOTE — TELEPHONE ENCOUNTER
Please call the patient and let her know that the referral can be for any orthopedic surgeon in the [facility] system as we discussed during the visit.  She has to call Elías Gutierrez to make an appointment.  In regards to the injection, I believe the pain is still coming from the same location at L1/2.  She does not have any canal or foraminal stenosis at the levels below this but has canal stenosis at L1/2.

## 2018-07-30 ENCOUNTER — PATIENT MESSAGE (OUTPATIENT)
Dept: PSYCHIATRY | Facility: CLINIC | Age: 58
End: 2018-07-30

## 2018-08-01 ENCOUNTER — PATIENT MESSAGE (OUTPATIENT)
Dept: PAIN MEDICINE | Facility: CLINIC | Age: 58
End: 2018-08-01

## 2018-08-01 NOTE — TELEPHONE ENCOUNTER
Spoke with pt about her financial barriers.  Pt and  both receive SSDI totaling ~ 29,500.  This places them over the limit for Medicaid and [place] Medicare Savings Programs.  Pt is receiving Extra Help through [facility] for her Part D benefits but understands that if her  applies he will be denied and she may lose this benefit because their income is too high, now (pt began receiving this benefit when her  was not receiving benefits).  DORIS advised that couple may be eligible for [facility] Financial Assistance and assisted pt to call [facility] billing.  Spoke to Lauren, who advised pt will need to complete a full application and submit income verification.  Assisted pt with application and emailed to her [employer] address.  She will sign and return the signature page, along with documentation of the couple's income, bank statements, and 's insurance card.  After this is completed, we will explore additional resources for pt and .

## 2018-08-07 DIAGNOSIS — M51.36 DDD (DEGENERATIVE DISC DISEASE), LUMBAR: Primary | ICD-10-CM

## 2018-08-08 ENCOUNTER — HOSPITAL ENCOUNTER (OUTPATIENT)
Facility: HOSPITAL | Age: 58
Discharge: HOME | End: 2018-08-08
Attending: ANESTHESIOLOGY | Admitting: ANESTHESIOLOGY
Payer: MEDICARE

## 2018-08-08 ENCOUNTER — SURGERY (OUTPATIENT)
Age: 58
End: 2018-08-08

## 2018-08-08 ENCOUNTER — HOSPITAL ENCOUNTER (OUTPATIENT)
Dept: RADIOLOGY | Facility: HOSPITAL | Age: 58
Discharge: HOME | End: 2018-08-08
Attending: ANESTHESIOLOGY | Admitting: ANESTHESIOLOGY
Payer: MEDICARE

## 2018-08-08 VITALS
HEART RATE: 70 BPM | OXYGEN SATURATION: 96 % | SYSTOLIC BLOOD PRESSURE: 165 MMHG | TEMPERATURE: 98 F | DIASTOLIC BLOOD PRESSURE: 85 MMHG | RESPIRATION RATE: 18 BRPM

## 2018-08-08 DIAGNOSIS — M54.16 LUMBAR RADICULOPATHY: Primary | ICD-10-CM

## 2018-08-08 DIAGNOSIS — M51.36 DDD (DEGENERATIVE DISC DISEASE), LUMBAR: ICD-10-CM

## 2018-08-08 LAB — GLUCOSE SERPL-MCNC: 122 MG/DL (ref 70–110)

## 2018-08-08 PROCEDURE — 76000 FLUOROSCOPY <1 HR PHYS/QHP: CPT | Mod: TC,PO

## 2018-08-08 PROCEDURE — 64483 NJX AA&/STRD TFRM EPI L/S 1: CPT | Mod: 50,PO | Performed by: ANESTHESIOLOGY

## 2018-08-08 PROCEDURE — 25000003 PHARM REV CODE 250: Mod: PO | Performed by: ANESTHESIOLOGY

## 2018-08-08 PROCEDURE — 63600175 PHARM REV CODE 636 W HCPCS: Mod: PO | Performed by: ANESTHESIOLOGY

## 2018-08-08 PROCEDURE — 64483 NJX AA&/STRD TFRM EPI L/S 1: CPT | Mod: 50,,, | Performed by: ANESTHESIOLOGY

## 2018-08-08 PROCEDURE — 25500020 PHARM REV CODE 255: Mod: PO | Performed by: ANESTHESIOLOGY

## 2018-08-08 RX ORDER — ALPRAZOLAM 0.5 MG/1
1 TABLET, ORALLY DISINTEGRATING ORAL ONCE AS NEEDED
Status: COMPLETED | OUTPATIENT
Start: 2018-08-08 | End: 2018-08-08

## 2018-08-08 RX ORDER — LIDOCAINE HYDROCHLORIDE 20 MG/ML
INJECTION, SOLUTION INFILTRATION; PERINEURAL
Status: DISCONTINUED | OUTPATIENT
Start: 2018-08-08 | End: 2018-08-08 | Stop reason: HOSPADM

## 2018-08-08 RX ORDER — BUPIVACAINE HYDROCHLORIDE 2.5 MG/ML
INJECTION, SOLUTION EPIDURAL; INFILTRATION; INTRACAUDAL
Status: DISCONTINUED | OUTPATIENT
Start: 2018-08-08 | End: 2018-08-08 | Stop reason: HOSPADM

## 2018-08-08 RX ORDER — METHYLPREDNISOLONE ACETATE 80 MG/ML
INJECTION, SUSPENSION INTRA-ARTICULAR; INTRALESIONAL; INTRAMUSCULAR; SOFT TISSUE
Status: DISCONTINUED | OUTPATIENT
Start: 2018-08-08 | End: 2018-08-08 | Stop reason: HOSPADM

## 2018-08-08 RX ADMIN — BUPIVACAINE HYDROCHLORIDE 3 ML: 2.5 INJECTION, SOLUTION EPIDURAL; INFILTRATION; INTRACAUDAL; PERINEURAL at 01:08

## 2018-08-08 RX ADMIN — ALPRAZOLAM 1 MG: 0.5 TABLET, ORALLY DISINTEGRATING ORAL at 12:08

## 2018-08-08 RX ADMIN — METHYLPREDNISOLONE ACETATE 80 MG: 80 INJECTION, SUSPENSION INTRA-ARTICULAR; INTRALESIONAL; INTRAMUSCULAR; SOFT TISSUE at 01:08

## 2018-08-08 RX ADMIN — IOHEXOL 3 ML: 300 INJECTION, SOLUTION INTRAVENOUS at 01:08

## 2018-08-08 RX ADMIN — LIDOCAINE HYDROCHLORIDE 10 ML: 20 INJECTION, SOLUTION INFILTRATION; PERINEURAL at 01:08

## 2018-08-08 NOTE — OP NOTE
PROCEDURE DATE: 8/8/2018    PROCEDURE: Bilateral L1/2 transforaminal epidural steroid injection under fluoroscopy    DIAGNOSIS: Lumbar  Radiculopathy    Post op diagnosis: Same    PHYSICIAN: Jeffry Seaman MD    MEDICATIONS INJECTED:  Methylprednisolone 40mg (0.5ml) and 1.5ml 0.25% bupivicaine at each nerve root.     LOCAL ANESTHETIC INJECTED:  Lidocaine 2%. 4 ml per site.    SEDATION MEDICATIONS: none    ESTIMATED BLOOD LOSS:  none    COMPLICATIONS:  none    TECHNIQUE:   A time-out was taken to identify patient and procedure side prior to starting the procedure. The patient was placed in a prone position, prepped and draped in the usual sterile fashion using ChloraPrep and sterile towels.  The area to be injected was determined under fluoroscopic guidance in AP and oblique view.  Local anesthetic was given by raising a wheal and going down to the hub of a 25-gauge 1.5 inch needle.  In oblique view, a 3.5 inch 22-gauge bent-tip spinal needle was introduced towards 6 oclock position of the pedicle of each above named nerve root level.  The needle was walked medially then hinged into the neural foramen and position was confirmed in AP and lateral views.  Omnipaque contrast dye was injected to confirm appropriate placement and that there was no vascular uptake.  After negative aspiration for blood or CSF, the medication was then injected. This was performed at the right and then left L1/2 level(s). The patient tolerated the procedure well.    The patient was monitored after the procedure.  Patient was given post procedure and discharge instructions to follow at home. The patient was discharged in a stable condition.

## 2018-08-08 NOTE — DISCHARGE INSTRUCTIONS
Recovery After Procedural Sedation (Adult)  You have been given medicine by vein to make you sleep during your surgery. This may have included both a pain medicine and sleeping medicine. Most of the effects have worn off. But you may still have some drowsiness for the next 6 to 8 hours.  Home care  Follow these guidelines when you get home:  · For the next 8 hours, you should be watched by a responsible adult. This person should make sure your condition is not getting worse.  · Don't drink any alcohol for the next 24 hours.  · Don't drive, operate dangerous machinery, or make important business or personal decisions during the next 24 hours.  Note: Your healthcare provider may tell you not to take any medicine by mouth for pain or sleep in the next 4 hours. These medicines may react with the medicines you were given in the hospital. This could cause a much stronger response than usual.  Follow-up care  Follow up with your healthcare provider if you are not alert and back to your usual level of activity within 12 hours.  When to seek medical advice  Call your healthcare provider right away if any of these occur:  · Drowsiness gets worse  · Weakness or dizziness gets worse  · Repeated vomiting  · You can't be awakened   Date Last Reviewed: 10/18/2016  © 9740-9983 [employer]. [address] [place] [address], [place] [address]. All rights reserved. This information is not intended as a substitute for professional medical care. Always follow your healthcare professional's instructions.    Home care instructions  Apply ice pack to the injection site for 20 minutes periods for the first 24 hrs for soreness/discomfort at injection site DO NOT USE HEAT FOR 24 HOURS  Keep site clean and dry for 24 hours, remove bandaid when desired  Do not drive until tomorrow  Take care when walking after a lumbar injection  Avoid strenuous activities for 2 days  Make take 2 weeks to feel the full effects   Resume home medication as  prescribed today  Resume Aspirin, Plavix, or Coumadin the day after the procedure unless otherwise instructed.    SEE IMMEDIATE MEDICAL HELP FOR:  Severe increase in your usual pain or appearance of new pain  Prolonged or increasing weakness or numbness in the legs or arms  Drainage, redness, active bleeding, or increased swelling at the injection site  Temperature over 100.0 degrees F.  Headache that increases when your head is upright and decreases when you lie flat    CALL 911 OR GO DIRECTLY TO EMERGENCY DEPARTMENT FOR:  Shortness of breath, chest pain, or problems breathing

## 2018-08-08 NOTE — H&P (VIEW-ONLY)
This note was completed with dictation software and grammatical errors may exist.    CC: Neck and bilateral arm pain, back pain, knee pain     HPI: The patient is a 58-year-old woman with a history of hypertension, diabetes, multiple sclerosis, lumbar postlaminectomy syndrome who presents in self-referral for continued low back pain and bilateral leg pain, neck pain. She returns in follow-up today with multiple pain complaints.  Her neck pain has returned and is radiating to both arms.  She also reports a return of her pain in the upper lumbar region but her main complaint is left knee pain.  She saw an orthopedic surgeon in her hometown that told her she needed a knee replacement but she does not want to see him anymore.  She would like to see an orthopedic surgeon at [facility].  Her pain is worse with walking, activity, are mildly improved with medication.  She is again requesting to increase her opioids.  She reports weakness in her left foot and hands.  She reports numbness in her toes.  She reports occasional incontinence.    Pain intervention history: She has been followed by one of our previous [facility] pain management physicians, Dr. Chapa in [place] until recently.  She had undergone fusion surgery in 1983 and again surgery in 1993 for her lumbar spine.  She had undergone numerous lumbar spine injections in the past with good relief.  She has tried gabapentin with only minimal relief but states that Lyrica works best.  Sounds like she had a spinal cord stimulator trial in the past that did not help.  She has been taking hydrocodone up to 4 times a day for several years.  She is status post caudal epidural steroid injection on 8/6/14 with 60% relief.  She is status post caudal epidural steroid injection on 5/13/15 with 0% relief.  She is status post caudal epidural steroid injection on 10/2/15 with 0% relief.  She is status post C7-T1 cervical interlaminar epidural steroid injection on 9/7/16 with  100% relief of her bilateral arm pain and mild relief of her neck pain.  She is status post C7-T1 cervical interlaminar epidural steroid injection on 2/21/17 with at least 50% relief.  She is status post bilateral L1 transforaminal epidural steroid injections on 3/20/17 with 75% relief. She is status post C7-T1 cervical interlaminar epidural steroid injection on 12/22/17 and again on 2/9/18 with 90% relief of her neck and arm pain.     ROS:She reports urinary frequency, joint stiffness, joint swelling, back pain, memory loss, anxiety, depression, difficulty sleeping and loss of balance.  Balance of review of systems is negative.    Medical, surgical, family and social history reviewed elsewhere in record.    Medications/Allergies: See med card    Vitals:    07/13/18 0818   BP: (!) 142/72   Pulse: 74   Resp: 20   Temp: 97.4 °F (36.3 °C)   TempSrc: Oral   SpO2: 95%   Weight: 93.9 kg (207 lb 2 oz)   PainSc:   6   PainLoc: Neck         Physical exam:  Gen: A and O x3, pleasant, well-groomed  Skin: No rashes or obvious lesions  HEENT: PERRLA  CVS: Regular rate and rhythm, normal S1 and S2, no murmurs.    Resp: Clear to auscultation bilaterally, no wheezes or rales.  Abdomen: Soft, NT/ND, normal bowel sounds present.  Musculoskeletal: Antalgic gait, slow cautious, kyphotic stance.      Neuro:  Upper extremities: 5/5 strength bilaterally, except for 4/5  strength  Lower extremities: 5/5 strength bilaterally, except for 4/5 left foot dorsiflexion  Reflexes: Brachioradialis 0+, Bicep 1+, Tricep 0+. Patellar 0+, Achilles 0+ bilaterally.  Sensory: Intact and symmetrical to light touch and pinprick in C2-T1 dermatomes bilaterally.Intact and symmetrical to light touch and pinprick in L2-S1 dermatomes bilaterally.    Cervical spine:   Range of motion mildly reduced with flexion, extension and lateral rotation without increased pain.  Spurling's test positive for bilateral neck and shoulder pain  Myofascial exam: Mild  tenderness palpation to the cervical paraspinous muscles.    Lumbar spine:  Lumbar spine: ROM is Severely limited with flexion and extension with increased upper lumbar region pain during each maneuver.  Jeff's test is deferred due to left knee pain.  Supine straight leg raise is negative bilaterally.    Internal and external rotation of the hip causes no increased pain on either side.  Myofascial exam: mild tenderness to palpation across the upper lumbar paraspinous muscles.      Imaging:  MRI C-spine 10/10/17  Comparison MRI cervical spine 08/16/2016.  A minimal reversal of the mid cervical curvature again noted with anatomic alignment.  Degenerative changes of the discs including loss of height and desiccation also again noted at C4-5 and C5-6.  C3-4, moderate right facet arthropathy.  C4-5, bulging of the disc again noted with effacement of the adjacent ventral subarachnoid space.  C5-6, bulging of the disc again noted with superimposed small more focal right paracentral protrusion of the disc with complete effacement of ventral subarachnoid space and flattening of the ventral cord more so on the right, unchanged.  Overall mild degree of canal stenosis results.  Bilateral uncinate hypertrophy is present with moderate to severe bilateral foraminal stenosis.  C6-7, mild annular bulging slightly asymmetric to the left posteriorly.  C7-T1, small posterior midline/right prominent midline protrusion, unchanged.  Mild annular bulging also suspected T1-2.  The cervical cord maintains normal signal throughout.  No abnormal intra-dural enhancement is seen.    MRI lumbar spine 2/1/17  There are postoperative changes of posterior instrumented fusion at L2-S1.  There is a grade I anterolisthesis of L5 on S1.  The lumbar vertebral bodies show normal height without evidence of acute compression fracture or pathologic marrow replacement process.  There is a levoconvex curvature of the lumbar spine. There is degenerative  desiccation, disc space narrowing, and degenerative endplate change at T11-T12, T12-L1, L1-L2, and L5-S1.  There is near complete fusion of the L2, L3, L4, and L5 vertebral bodies.  There are probable postoperative changes of posterior decompression at L2-S1. The conus medullaris terminates at L1. The visualized retroperitoneal/abdominal soft tissue  structures are unremarkable.  T11-T12: There is a broad disc bulge which flattens the ventral aspect of the conus.  There is ligamentum flavum thickening and facet arthropathy.  There is moderate bilateral neuroforaminal stenosis present.  There is mild-moderate central canal stenosis.  T12-L1: There is a broad disc bulge with a superimposed ascending right paracentral disc extrusion which extends approximately 10 mm above the disc plane.  There is mild-moderate central canal stenosis present.  There is mild right neuroforaminal stenosis.  L1-L2: There is a broad disc bulge throughout the period there is ligamentum flavum thickening.  The central canal is obscured.  There is a suggestion of a possible right paracentral disc protrusion.  There is mild-moderate overall central canal stenosis.  The neural foramina are not assessed secondary to metal artifact.  L2-L3: No central canal or neuroforaminal stenosis. No disc protrusion or extrusion.  L3-L4: No central canal or neuroforaminal stenosis. No disc protrusion or extrusion.  L4-L5: The left neural foramen is not optimally evaluated due to metal artifact.  No central canal stenosis or right neuroforaminal stenosis.  L5-S1: There is a grade I anterolisthesis of L5 on S1 with associated uncovering of the intervertebral disc.  No definite central canal stenosis or neuroforaminal stenosis.      Assessment:  The patient is a 58-year-old woman with a history of hypertension, diabetes, multiple sclerosis, lumbar postlaminectomy syndrome who presents in self-referral for continued low back pain and bilateral leg pain, neck  pain.   1. Lumbar radiculopathy     2. Postlaminectomy syndrome of lumbar region     3. Cervical radiculopathy     4. Cervical stenosis of spinal canal     5. Left knee pain, unspecified chronicity  Ambulatory referral to Orthopedics   6. Multiple sclerosis     7. Opioid contract exists                                                                                                                                                                                                 Plan:  1.  For her back pain, I will set her up to repeat bilateral L1/2 transforaminal epidural steroid injections.  She did very well with this over one year ago.  4 weeks later we will schedule a cervical ROBIN for her neck and bilateral arm pain.  2.  I placed a referral to orthopedics for her left knee pain.  She would like to see someone at [facility] and will make this appointment.  3.  She again asked to increase her opioids and we have recommended against this.  Dr. Seaman provided prescriptions for hydrocodone-acetaminophen 10/325 mg up to 4 times a day as needed for pain.  4.  Follow-up in 4 weeks after the cervical ROBIN or sooner as needed.    Greater than 50% of this 25 minute visit was spent on counseling the patient.

## 2018-08-08 NOTE — DISCHARGE SUMMARY
[facility]  Discharge Note  Short Stay    Admit Date: 8/8/2018    Discharge Date: 8/8/2018    Attending Physician: Jeffry Seaman MD     Discharge Provider: Jeffry Seaman    Diagnoses:  Active Hospital Problems    Diagnosis  POA    *Lumbar radiculopathy [M54.16]  Yes      Resolved Hospital Problems    Diagnosis Date Resolved POA   No resolved problems to display.       Discharged Condition: good    Final Diagnoses: Lumbar radiculopathy [M54.16]    Disposition: Home or Self Care    Hospital Course: no complications, uneventful    Outcome of Hospitalization, Treatment, Procedure, or Surgery:  Patient was admitted for outpatient procedure. The patient underwent procedure without complications and are discharged home    Follow up/Patient Instructions:  Follow up as scheduled in Pain Management clinic in 3-4 weeks/Patient has received instructions and follow up date and time    Medications:  Continue previous medications      Discharge Procedure Orders  Call MD for:  temperature >100.4     Call MD for:  severe uncontrolled pain     Call MD for:  redness, tenderness, or signs of infection (pain, swelling, redness, odor or green/yellow discharge around incision site)     Call MD for:  severe persistent headache     No dressing needed     Call MD for:  temperature >100.4     Call MD for:  severe uncontrolled pain     Call MD for:  redness, tenderness, or signs of infection (pain, swelling, redness, odor or green/yellow discharge around incision site)     Call MD for:  severe persistent headache     No dressing needed           Discharge Procedure Orders (must include Diet, Follow-up, Activity):    Discharge Procedure Orders (must include Diet, Follow-up, Activity)  Call MD for:  temperature >100.4     Call MD for:  severe uncontrolled pain     Call MD for:  redness, tenderness, or signs of infection (pain, swelling, redness, odor or green/yellow discharge around incision site)     Call MD for:  severe  persistent headache     No dressing needed     Call MD for:  temperature >100.4     Call MD for:  severe uncontrolled pain     Call MD for:  redness, tenderness, or signs of infection (pain, swelling, redness, odor or green/yellow discharge around incision site)     Call MD for:  severe persistent headache     No dressing needed

## 2018-08-10 ENCOUNTER — TELEPHONE (OUTPATIENT)
Dept: PSYCHIATRY | Facility: CLINIC | Age: 58
End: 2018-08-10

## 2018-08-10 NOTE — TELEPHONE ENCOUNTER
Sent email to pt to follow up on documents that are needed to complete application for [employer] $ assistance.  Awaiting response.

## 2018-08-16 ENCOUNTER — PATIENT MESSAGE (OUTPATIENT)
Dept: SURGERY | Facility: HOSPITAL | Age: 58
End: 2018-08-16

## 2018-08-20 ENCOUNTER — PATIENT MESSAGE (OUTPATIENT)
Dept: PSYCHIATRY | Facility: CLINIC | Age: 58
End: 2018-08-20

## 2018-08-21 ENCOUNTER — DOCUMENTATION ONLY (OUTPATIENT)
Dept: NEUROLOGY | Facility: CLINIC | Age: 58
End: 2018-08-21

## 2018-08-21 ENCOUNTER — PATIENT MESSAGE (OUTPATIENT)
Dept: NEUROLOGY | Facility: CLINIC | Age: 58
End: 2018-08-21

## 2018-08-21 DIAGNOSIS — G35 MULTIPLE SCLEROSIS: ICD-10-CM

## 2018-08-21 RX ORDER — TERIFLUNOMIDE 14 MG/1
14 TABLET, FILM COATED ORAL DAILY
Qty: 90 TABLET | Refills: 0 | Status: ON HOLD | OUTPATIENT
Start: 2018-08-21 | End: 2020-02-14

## 2018-08-21 NOTE — PROGRESS NOTES
Fax received from [facility] with recent lab results: T3, T4, TSH, HbA1C, vitamin D, LFT, CBC (collected 8/20/18). Placed in Adrianne's folder for review.

## 2018-08-21 NOTE — TELEPHONE ENCOUNTER
Received labs done at [facility] on 8/20/18.     WBC=14.1 (high)--will reach out to patient to inquire about recent infections.   Free T3=3.5 (normal)  Free T4=0.99 (normal)  TSH=1.70 (normal)  HB1C=7.8 (high)--she must discuss with primary or endocrinology  Vit D=57.1 (She should continue taking 5000 units during the week and 10,000 on the weekends).     ALT=37  AST=22      Message sent to patient with results.

## 2018-08-27 NOTE — TELEPHONE ENCOUNTER
Received pt's financial assistance application by mail and hand-delivered to Saurabh in Financial Services.  Notified pt that she should expect a decision in about 2 weeks.

## 2018-08-29 ENCOUNTER — TELEPHONE (OUTPATIENT)
Dept: PAIN MEDICINE | Facility: CLINIC | Age: 58
End: 2018-08-29

## 2018-08-29 NOTE — TELEPHONE ENCOUNTER
----- Message from Kieran López sent at 8/29/2018  8:36 AM CDT -----  Contact: Pt  The Pt is requesting a call back regarding cancelling her procedure. Please call Pt to advise.     Call Back#: [phone]   Thanks

## 2018-08-29 NOTE — TELEPHONE ENCOUNTER
Spoke with the patient and she will call back at a later date to schedule. She is having problems with her hip and has torn ligaments.

## 2018-10-02 ENCOUNTER — PATIENT MESSAGE (OUTPATIENT)
Dept: NEUROLOGY | Facility: CLINIC | Age: 58
End: 2018-10-02

## 2018-10-03 ENCOUNTER — OFFICE VISIT (OUTPATIENT)
Dept: PAIN MEDICINE | Facility: CLINIC | Age: 58
End: 2018-10-03
Payer: MEDICARE

## 2018-10-03 VITALS
DIASTOLIC BLOOD PRESSURE: 74 MMHG | HEART RATE: 78 BPM | TEMPERATURE: 98 F | RESPIRATION RATE: 18 BRPM | SYSTOLIC BLOOD PRESSURE: 126 MMHG

## 2018-10-03 DIAGNOSIS — G35 MULTIPLE SCLEROSIS: ICD-10-CM

## 2018-10-03 DIAGNOSIS — M48.02 CERVICAL STENOSIS OF SPINAL CANAL: ICD-10-CM

## 2018-10-03 DIAGNOSIS — M25.562 LEFT KNEE PAIN, UNSPECIFIED CHRONICITY: ICD-10-CM

## 2018-10-03 DIAGNOSIS — M25.551 RIGHT HIP PAIN: ICD-10-CM

## 2018-10-03 DIAGNOSIS — M54.16 LUMBAR RADICULOPATHY: Primary | ICD-10-CM

## 2018-10-03 DIAGNOSIS — Z79.891 OPIOID CONTRACT EXISTS: ICD-10-CM

## 2018-10-03 DIAGNOSIS — M96.1 POSTLAMINECTOMY SYNDROME OF LUMBAR REGION: ICD-10-CM

## 2018-10-03 PROCEDURE — 99214 OFFICE O/P EST MOD 30 MIN: CPT | Mod: S$PBB,,, | Performed by: PHYSICIAN ASSISTANT

## 2018-10-03 PROCEDURE — 99999 PR PBB SHADOW E&M-EST. PATIENT-LVL V: CPT | Mod: PBBFAC,,, | Performed by: PHYSICIAN ASSISTANT

## 2018-10-03 PROCEDURE — 3074F SYST BP LT 130 MM HG: CPT | Mod: CPTII,,, | Performed by: PHYSICIAN ASSISTANT

## 2018-10-03 PROCEDURE — 99215 OFFICE O/P EST HI 40 MIN: CPT | Mod: PBBFAC,PN | Performed by: PHYSICIAN ASSISTANT

## 2018-10-03 PROCEDURE — 3078F DIAST BP <80 MM HG: CPT | Mod: CPTII,,, | Performed by: PHYSICIAN ASSISTANT

## 2018-10-03 RX ORDER — HYDROCODONE BITARTRATE AND ACETAMINOPHEN 10; 325 MG/1; MG/1
1 TABLET ORAL EVERY 6 HOURS PRN
Qty: 120 TABLET | Refills: 0 | Status: SHIPPED | OUTPATIENT
Start: 2018-11-09 | End: 2018-12-09

## 2018-10-03 RX ORDER — HYDROCODONE BITARTRATE AND ACETAMINOPHEN 10; 325 MG/1; MG/1
1 TABLET ORAL EVERY 6 HOURS PRN
Qty: 120 TABLET | Refills: 0 | Status: SHIPPED | OUTPATIENT
Start: 2018-10-10 | End: 2018-11-09

## 2018-10-03 RX ORDER — HYDROCODONE BITARTRATE AND ACETAMINOPHEN 10; 325 MG/1; MG/1
1 TABLET ORAL EVERY 6 HOURS PRN
Qty: 120 TABLET | Refills: 0 | Status: SHIPPED | OUTPATIENT
Start: 2018-12-09 | End: 2019-01-09 | Stop reason: SDUPTHER

## 2018-10-03 NOTE — PROGRESS NOTES
This note was completed with dictation software and grammatical errors may exist.    CC: Neck and bilateral arm pain, back pain, knee pain     HPI: The patient is a 58-year-old woman with a history of hypertension, diabetes, multiple sclerosis, lumbar postlaminectomy syndrome who presents in self-referral for continued low back pain and bilateral leg pain, neck pain. She is status post bilateral L1/2 transforaminal epidural steroid injections on 08/08/2018 with almost 100% relief of her low back pain. She did not have the cervical ROBIN that was scheduled, states that her neck pain and arm pain has significantly improved.  Her main complaints are left knee pain and she is going to have a total knee replacement on October 22nd with Dr Hess in [place].  She also reports right hip pain. She reports falling at 1:00 a.m. on 08/09 and 2 or a muscle in her right hip.  She reports having a right GTB injection with her orthopedic surgeon without relief.  She states that he told her this will just have to heal and no surgery is recommended.  She has been relatively sedentary because it is difficult for her to walk due to her left knee and right hip pain. She reports generalized weakness, occasional incontinence as well as numbness in her toes.    Pain intervention history: She has been followed by one of our previous [facility] pain management physicians, Dr. Chapa in [place] until recently.  She had undergone fusion surgery in 1983 and again surgery in 1993 for her lumbar spine.  She had undergone numerous lumbar spine injections in the past with good relief.  She has tried gabapentin with only minimal relief but states that Lyrica works best.  Sounds like she had a spinal cord stimulator trial in the past that did not help.  She has been taking hydrocodone up to 4 times a day for several years.  She is status post caudal epidural steroid injection on 8/6/14 with 60% relief.  She is status post caudal epidural steroid  injection on 5/13/15 with 0% relief.  She is status post caudal epidural steroid injection on 10/2/15 with 0% relief.  She is status post C7-T1 cervical interlaminar epidural steroid injection on 9/7/16 with 100% relief of her bilateral arm pain and mild relief of her neck pain.  She is status post C7-T1 cervical interlaminar epidural steroid injection on 2/21/17 with at least 50% relief.  She is status post bilateral L1 transforaminal epidural steroid injections on 3/20/17 with 75% relief. She is status post C7-T1 cervical interlaminar epidural steroid injection on 12/22/17 and again on 2/9/18 with 90% relief of her neck and arm pain.  She is status post bilateral L1/2 transforaminal epidural steroid injections on 08/08/2018 with almost 100% relief of her low back pain.    ROS:She reports urinary frequency, joint stiffness, joint swelling, back pain, memory loss, anxiety, depression, difficulty sleeping and loss of balance.  Balance of review of systems is negative.    Medical, surgical, family and social history reviewed elsewhere in record.    Medications/Allergies: See med card    Vitals:    10/03/18 1040   BP: 126/74   Pulse: 78   Resp: 18   Temp: 98 °F (36.7 °C)   TempSrc: Oral   PainSc:   6   PainLoc: Back         Physical exam:  Gen: A and O x3, pleasant, well-groomed  Skin: No rashes or obvious lesions  HEENT: PERRLA  CVS: Regular rate and rhythm, normal S1 and S2, no murmurs.    Resp: Clear to auscultation bilaterally, no wheezes or rales.  Abdomen: Soft, NT/ND, normal bowel sounds present.  Musculoskeletal: Antalgic gait, slow cautious, kyphotic stance.      Neuro:  Upper extremities: 5/5 strength bilaterally, except for 4/5  strength  Lower extremities: 5/5 strength bilaterally, except for 4/5 left foot dorsiflexion  Reflexes: Brachioradialis 0+, Bicep 1+, Tricep 0+. Patellar 0+, Achilles 0+ bilaterally.  Sensory: Intact and symmetrical to light touch and pinprick in C2-T1 dermatomes  bilaterally.Intact and symmetrical to light touch and pinprick in L2-S1 dermatomes bilaterally.    Cervical spine:   Range of motion mildly reduced with flexion, extension and lateral rotation without increased pain.  Spurling's test positive for bilateral neck and shoulder pain  Myofascial exam: Mild tenderness palpation to the cervical paraspinous muscles.    Lumbar spine:  Lumbar spine: ROM is moderately reduced with flexion extension without increased pain.  Jeff's test is deferred due to left knee pain and right hip pain.  Supine straight leg raise is negative bilaterally.    Internal and external rotation of the hip is deferred due to left knee pain and right hip pain.  Myofascial exam:  No tenderness to palpation to the lumbar paraspinous muscles.      Imaging:  MRI C-spine 10/10/17  Comparison MRI cervical spine 08/16/2016.  A minimal reversal of the mid cervical curvature again noted with anatomic alignment.  Degenerative changes of the discs including loss of height and desiccation also again noted at C4-5 and C5-6.  C3-4, moderate right facet arthropathy.  C4-5, bulging of the disc again noted with effacement of the adjacent ventral subarachnoid space.  C5-6, bulging of the disc again noted with superimposed small more focal right paracentral protrusion of the disc with complete effacement of ventral subarachnoid space and flattening of the ventral cord more so on the right, unchanged.  Overall mild degree of canal stenosis results.  Bilateral uncinate hypertrophy is present with moderate to severe bilateral foraminal stenosis.  C6-7, mild annular bulging slightly asymmetric to the left posteriorly.  C7-T1, small posterior midline/right prominent midline protrusion, unchanged.  Mild annular bulging also suspected T1-2.  The cervical cord maintains normal signal throughout.  No abnormal intra-dural enhancement is seen.    MRI lumbar spine 2/1/17  There are postoperative changes of posterior instrumented  fusion at L2-S1.  There is a grade I anterolisthesis of L5 on S1.  The lumbar vertebral bodies show normal height without evidence of acute compression fracture or pathologic marrow replacement process.  There is a levoconvex curvature of the lumbar spine. There is degenerative desiccation, disc space narrowing, and degenerative endplate change at T11-T12, T12-L1, L1-L2, and L5-S1.  There is near complete fusion of the L2, L3, L4, and L5 vertebral bodies.  There are probable postoperative changes of posterior decompression at L2-S1. The conus medullaris terminates at L1. The visualized retroperitoneal/abdominal soft tissue  structures are unremarkable.  T11-T12: There is a broad disc bulge which flattens the ventral aspect of the conus.  There is ligamentum flavum thickening and facet arthropathy.  There is moderate bilateral neuroforaminal stenosis present.  There is mild-moderate central canal stenosis.  T12-L1: There is a broad disc bulge with a superimposed ascending right paracentral disc extrusion which extends approximately 10 mm above the disc plane.  There is mild-moderate central canal stenosis present.  There is mild right neuroforaminal stenosis.  L1-L2: There is a broad disc bulge throughout the period there is ligamentum flavum thickening.  The central canal is obscured.  There is a suggestion of a possible right paracentral disc protrusion.  There is mild-moderate overall central canal stenosis.  The neural foramina are not assessed secondary to metal artifact.  L2-L3: No central canal or neuroforaminal stenosis. No disc protrusion or extrusion.  L3-L4: No central canal or neuroforaminal stenosis. No disc protrusion or extrusion.  L4-L5: The left neural foramen is not optimally evaluated due to metal artifact.  No central canal stenosis or right neuroforaminal stenosis.  L5-S1: There is a grade I anterolisthesis of L5 on S1 with associated uncovering of the intervertebral disc.  No definite central  canal stenosis or neuroforaminal stenosis.      Assessment:  The patient is a 58-year-old woman with a history of hypertension, diabetes, multiple sclerosis, lumbar postlaminectomy syndrome who presents in self-referral for continued low back pain and bilateral leg pain, neck pain.   1. Lumbar radiculopathy     2. Postlaminectomy syndrome of lumbar region     3. Cervical stenosis of spinal canal     4. Left knee pain, unspecified chronicity     5. Right hip pain     6. Multiple sclerosis     7. Opioid contract exists                                                                                                                                                                                                 Plan:  1.  The patient did well following the bilateral L1/2 transforaminal epidural steroid injections.  This can be repeated in the future if necessary.  If her neck and bilateral arm pain returns, we can repeat a cervical ROBIN.  2.  Dr. Seaman provided prescriptions for hydrocodone-acetaminophen 10/325 mg up to 4 times daily as needed for pain.  3.  She is going to have a left total knee replacement on 10/22/2018.  4.  Follow-up in 3 months or sooner as needed.    Greater than 50% of this 25 minute visit was spent on counseling the patient.

## 2018-11-28 ENCOUNTER — PATIENT MESSAGE (OUTPATIENT)
Dept: PSYCHIATRY | Facility: CLINIC | Age: 58
End: 2018-11-28

## 2018-12-20 ENCOUNTER — PATIENT MESSAGE (OUTPATIENT)
Dept: PAIN MEDICINE | Facility: CLINIC | Age: 58
End: 2018-12-20

## 2018-12-20 ENCOUNTER — TELEPHONE (OUTPATIENT)
Dept: PAIN MEDICINE | Facility: CLINIC | Age: 58
End: 2018-12-20

## 2019-01-01 ENCOUNTER — PATIENT MESSAGE (OUTPATIENT)
Dept: PAIN MEDICINE | Facility: CLINIC | Age: 59
End: 2019-01-01

## 2019-01-09 ENCOUNTER — PATIENT MESSAGE (OUTPATIENT)
Dept: PAIN MEDICINE | Facility: CLINIC | Age: 59
End: 2019-01-09

## 2019-01-09 RX ORDER — HYDROCODONE BITARTRATE AND ACETAMINOPHEN 10; 325 MG/1; MG/1
1 TABLET ORAL EVERY 6 HOURS PRN
Qty: 120 TABLET | Refills: 0 | Status: SHIPPED | OUTPATIENT
Start: 2019-01-09 | End: 2019-01-25 | Stop reason: SDUPTHER

## 2019-01-25 ENCOUNTER — TELEPHONE (OUTPATIENT)
Dept: PAIN MEDICINE | Facility: CLINIC | Age: 59
End: 2019-01-25

## 2019-01-25 ENCOUNTER — OFFICE VISIT (OUTPATIENT)
Dept: PAIN MEDICINE | Facility: CLINIC | Age: 59
End: 2019-01-25
Payer: MEDICARE

## 2019-01-25 VITALS
RESPIRATION RATE: 18 BRPM | TEMPERATURE: 97 F | SYSTOLIC BLOOD PRESSURE: 129 MMHG | BODY MASS INDEX: 31.95 KG/M2 | OXYGEN SATURATION: 95 % | WEIGHT: 192 LBS | DIASTOLIC BLOOD PRESSURE: 59 MMHG | HEART RATE: 80 BPM

## 2019-01-25 DIAGNOSIS — Z79.891 OPIOID CONTRACT EXISTS: ICD-10-CM

## 2019-01-25 DIAGNOSIS — M96.1 POSTLAMINECTOMY SYNDROME OF LUMBAR REGION: ICD-10-CM

## 2019-01-25 DIAGNOSIS — G35 MULTIPLE SCLEROSIS: ICD-10-CM

## 2019-01-25 DIAGNOSIS — M54.12 CERVICAL RADICULOPATHY: Primary | ICD-10-CM

## 2019-01-25 DIAGNOSIS — M48.02 CERVICAL STENOSIS OF SPINAL CANAL: ICD-10-CM

## 2019-01-25 DIAGNOSIS — M54.16 LUMBAR RADICULOPATHY: ICD-10-CM

## 2019-01-25 PROCEDURE — 3008F PR BODY MASS INDEX (BMI) DOCUMENTED: ICD-10-PCS | Mod: CPTII,S$GLB,, | Performed by: PHYSICIAN ASSISTANT

## 2019-01-25 PROCEDURE — 3074F SYST BP LT 130 MM HG: CPT | Mod: CPTII,S$GLB,, | Performed by: PHYSICIAN ASSISTANT

## 2019-01-25 PROCEDURE — 99214 OFFICE O/P EST MOD 30 MIN: CPT | Mod: S$GLB,,, | Performed by: PHYSICIAN ASSISTANT

## 2019-01-25 PROCEDURE — 3078F PR MOST RECENT DIASTOLIC BLOOD PRESSURE < 80 MM HG: ICD-10-PCS | Mod: CPTII,S$GLB,, | Performed by: PHYSICIAN ASSISTANT

## 2019-01-25 PROCEDURE — 3008F BODY MASS INDEX DOCD: CPT | Mod: CPTII,S$GLB,, | Performed by: PHYSICIAN ASSISTANT

## 2019-01-25 PROCEDURE — 99999 PR PBB SHADOW E&M-EST. PATIENT-LVL V: ICD-10-PCS | Mod: PBBFAC,,, | Performed by: PHYSICIAN ASSISTANT

## 2019-01-25 PROCEDURE — 99999 PR PBB SHADOW E&M-EST. PATIENT-LVL V: CPT | Mod: PBBFAC,,, | Performed by: PHYSICIAN ASSISTANT

## 2019-01-25 PROCEDURE — 99214 PR OFFICE/OUTPT VISIT, EST, LEVL IV, 30-39 MIN: ICD-10-PCS | Mod: S$GLB,,, | Performed by: PHYSICIAN ASSISTANT

## 2019-01-25 PROCEDURE — 99499 RISK ADDL DX/OHS AUDIT: ICD-10-PCS | Mod: S$GLB,,, | Performed by: PHYSICIAN ASSISTANT

## 2019-01-25 PROCEDURE — 3078F DIAST BP <80 MM HG: CPT | Mod: CPTII,S$GLB,, | Performed by: PHYSICIAN ASSISTANT

## 2019-01-25 PROCEDURE — 99499 UNLISTED E&M SERVICE: CPT | Mod: S$GLB,,, | Performed by: PHYSICIAN ASSISTANT

## 2019-01-25 PROCEDURE — 3074F PR MOST RECENT SYSTOLIC BLOOD PRESSURE < 130 MM HG: ICD-10-PCS | Mod: CPTII,S$GLB,, | Performed by: PHYSICIAN ASSISTANT

## 2019-01-25 RX ORDER — HYDROCODONE BITARTRATE AND ACETAMINOPHEN 10; 325 MG/1; MG/1
1 TABLET ORAL EVERY 6 HOURS PRN
Qty: 120 TABLET | Refills: 0 | Status: SHIPPED | OUTPATIENT
Start: 2019-03-10 | End: 2019-04-09

## 2019-01-25 RX ORDER — TIZANIDINE 4 MG/1
4 TABLET ORAL 3 TIMES DAILY PRN
Qty: 270 TABLET | Refills: 1 | Status: SHIPPED | OUTPATIENT
Start: 2019-01-25 | End: 2019-02-24

## 2019-01-25 RX ORDER — SODIUM CHLORIDE, SODIUM LACTATE, POTASSIUM CHLORIDE, CALCIUM CHLORIDE 600; 310; 30; 20 MG/100ML; MG/100ML; MG/100ML; MG/100ML
INJECTION, SOLUTION INTRAVENOUS CONTINUOUS
Status: CANCELLED | OUTPATIENT
Start: 2019-02-05

## 2019-01-25 RX ORDER — PREGABALIN 50 MG/1
50 CAPSULE ORAL 3 TIMES DAILY
Qty: 90 CAPSULE | Refills: 2 | Status: SHIPPED | OUTPATIENT
Start: 2019-01-25 | End: 2019-05-07 | Stop reason: SDUPTHER

## 2019-01-25 RX ORDER — HYDROCODONE BITARTRATE AND ACETAMINOPHEN 10; 325 MG/1; MG/1
1 TABLET ORAL EVERY 6 HOURS PRN
Qty: 120 TABLET | Refills: 0 | Status: SHIPPED | OUTPATIENT
Start: 2019-04-09 | End: 2019-05-07 | Stop reason: SDUPTHER

## 2019-01-25 RX ORDER — HYDROCODONE BITARTRATE AND ACETAMINOPHEN 10; 325 MG/1; MG/1
1 TABLET ORAL EVERY 6 HOURS PRN
Qty: 120 TABLET | Refills: 0 | Status: SHIPPED | OUTPATIENT
Start: 2019-02-08 | End: 2019-03-10

## 2019-01-27 NOTE — PROGRESS NOTES
This note was completed with dictation software and grammatical errors may exist.    CC: Neck and bilateral arm pain, back pain, knee pain     HPI: The patient is a 58-year-old woman with a history of hypertension, diabetes, multiple sclerosis, lumbar postlaminectomy syndrome who presents in self-referral for continued low back pain and bilateral leg pain, neck pain. She returns in follow-up today with multiple pain complaints.  She is status post left total knee replacement on 10/22/2018.  She fell and her incision reopened so she had to go back to the operating room on 11/16/2018 to revise in close.  Her knee is improving but she reports a return of her neck pain and worsening low back pain. She describes neck pain that radiates to the trapezius muscles and shoulders, upper arms.  This is worse with using her arms in turning her head.  She also complains of bilateral low back pain currently without radiation to her legs.  She reports numbness in her toes, generalized weakness and occasional incontinence.    Pain intervention history: She has been followed by one of our previous [facility] pain management physicians, Dr. Chapa in [place] until recently.  She had undergone fusion surgery in 1983 and again surgery in 1993 for her lumbar spine.  She had undergone numerous lumbar spine injections in the past with good relief.  She has tried gabapentin with only minimal relief but states that Lyrica works best.  Sounds like she had a spinal cord stimulator trial in the past that did not help.  She has been taking hydrocodone up to 4 times a day for several years.  She is status post caudal epidural steroid injection on 8/6/14 with 60% relief.  She is status post caudal epidural steroid injection on 5/13/15 with 0% relief.  She is status post caudal epidural steroid injection on 10/2/15 with 0% relief.  She is status post C7-T1 cervical interlaminar epidural steroid injection on 9/7/16 with 100% relief of her bilateral  arm pain and mild relief of her neck pain.  She is status post C7-T1 cervical interlaminar epidural steroid injection on 2/21/17 with at least 50% relief.  She is status post bilateral L1 transforaminal epidural steroid injections on 3/20/17 with 75% relief. She is status post C7-T1 cervical interlaminar epidural steroid injection on 12/22/17 and again on 2/9/18 with 90% relief of her neck and arm pain.  She is status post bilateral L1/2 transforaminal epidural steroid injections on 08/08/2018 with almost 100% relief of her low back pain.    ROS:She reports urinary frequency, joint stiffness, joint swelling, back pain, memory loss, anxiety, depression, difficulty sleeping and loss of balance.  Balance of review of systems is negative.    Medical, surgical, family and social history reviewed elsewhere in record.    Medications/Allergies: See med card    Vitals:    01/25/19 1200   BP: (!) 129/59   Pulse: 80   Resp: 18   Temp: 97 °F (36.1 °C)   TempSrc: Oral   SpO2: 95%   Weight: 87.1 kg (192 lb)   PainSc:   6   PainLoc: Knee         Physical exam:  Gen: A and O x3, pleasant, well-groomed  Skin: No rashes or obvious lesions  HEENT: PERRLA  CVS: Regular rate and rhythm, normal S1 and S2, no murmurs.    Resp: Clear to auscultation bilaterally, no wheezes or rales.  Abdomen: Soft, NT/ND, normal bowel sounds present.  Musculoskeletal: Antalgic gait, slow cautious, kyphotic stance.      Neuro:  Upper extremities: 5/5 strength bilaterally, except for 4/5  strength  Lower extremities: 5/5 strength bilaterally, except for 4/5 left foot dorsiflexion  Reflexes: Brachioradialis 0+, Bicep 1+, Tricep 0+. Patellar 0+, Achilles 0+ bilaterally.  Sensory: Intact and symmetrical to light touch and pinprick in C2-T1 dermatomes bilaterally.Intact and symmetrical to light touch and pinprick in L2-S1 dermatomes bilaterally.    Cervical spine:   Range of motion mildly reduced with flexion, extension and lateral rotation with moderate  increased bilateral neck pain during each maneuver.  Spurling's test positive for bilateral neck and shoulder pain  Myofascial exam: Mild tenderness palpation to the cervical paraspinous muscles.    Lumbar spine:  Lumbar spine: ROM is moderately reduced with flexion extension with mild increased low back pain during each maneuver.  Jeff's test is deferred due to left knee pain and right hip pain.  Supine straight leg raise is negative bilaterally.    Internal and external rotation of the hip is deferred due to left knee pain and right hip pain.  Myofascial exam:  No tenderness to palpation to the lumbar paraspinous muscles.      Imaging:  MRI C-spine 10/10/17  Comparison MRI cervical spine 08/16/2016.  A minimal reversal of the mid cervical curvature again noted with anatomic alignment.  Degenerative changes of the discs including loss of height and desiccation also again noted at C4-5 and C5-6.  C3-4, moderate right facet arthropathy.  C4-5, bulging of the disc again noted with effacement of the adjacent ventral subarachnoid space.  C5-6, bulging of the disc again noted with superimposed small more focal right paracentral protrusion of the disc with complete effacement of ventral subarachnoid space and flattening of the ventral cord more so on the right, unchanged.  Overall mild degree of canal stenosis results.  Bilateral uncinate hypertrophy is present with moderate to severe bilateral foraminal stenosis.  C6-7, mild annular bulging slightly asymmetric to the left posteriorly.  C7-T1, small posterior midline/right prominent midline protrusion, unchanged.  Mild annular bulging also suspected T1-2.  The cervical cord maintains normal signal throughout.  No abnormal intra-dural enhancement is seen.    MRI lumbar spine 2/1/17  There are postoperative changes of posterior instrumented fusion at L2-S1.  There is a grade I anterolisthesis of L5 on S1.  The lumbar vertebral bodies show normal height without evidence  of acute compression fracture or pathologic marrow replacement process.  There is a levoconvex curvature of the lumbar spine. There is degenerative desiccation, disc space narrowing, and degenerative endplate change at T11-T12, T12-L1, L1-L2, and L5-S1.  There is near complete fusion of the L2, L3, L4, and L5 vertebral bodies.  There are probable postoperative changes of posterior decompression at L2-S1. The conus medullaris terminates at L1. The visualized retroperitoneal/abdominal soft tissue  structures are unremarkable.  T11-T12: There is a broad disc bulge which flattens the ventral aspect of the conus.  There is ligamentum flavum thickening and facet arthropathy.  There is moderate bilateral neuroforaminal stenosis present.  There is mild-moderate central canal stenosis.  T12-L1: There is a broad disc bulge with a superimposed ascending right paracentral disc extrusion which extends approximately 10 mm above the disc plane.  There is mild-moderate central canal stenosis present.  There is mild right neuroforaminal stenosis.  L1-L2: There is a broad disc bulge throughout the period there is ligamentum flavum thickening.  The central canal is obscured.  There is a suggestion of a possible right paracentral disc protrusion.  There is mild-moderate overall central canal stenosis.  The neural foramina are not assessed secondary to metal artifact.  L2-L3: No central canal or neuroforaminal stenosis. No disc protrusion or extrusion.  L3-L4: No central canal or neuroforaminal stenosis. No disc protrusion or extrusion.  L4-L5: The left neural foramen is not optimally evaluated due to metal artifact.  No central canal stenosis or right neuroforaminal stenosis.  L5-S1: There is a grade I anterolisthesis of L5 on S1 with associated uncovering of the intervertebral disc.  No definite central canal stenosis or neuroforaminal stenosis.      Assessment:  The patient is a 58-year-old woman with a history of hypertension,  diabetes, multiple sclerosis, lumbar postlaminectomy syndrome who presents in self-referral for continued low back pain and bilateral leg pain, neck pain.   1. Cervical radiculopathy  Vital signs    Place 18-22 gaua peripheral IV     Verify informed consent    Notify physician     Notify physician     Notify physician (specify)    Diet NPO    Case Request Operating Room: Injection-steroid-epidural-cervical    Place in Outpatient    lactated ringers infusion   2. Lumbar radiculopathy     3. Postlaminectomy syndrome of lumbar region     4. Cervical stenosis of spinal canal     5. Multiple sclerosis     6. Opioid contract exists                                                                                                                                                                                                 Plan:  1.  I will schedule the patient to repeat a cervical ROBIN.  She has done well with this in the past.  2.  We discussed repeating bilateral L1/2 transforaminal epidural steroid injections in the future if necessary.  3.  Dr. Seaman provided prescriptions for hydrocodone-acetaminophen 10/325 mg up to 4 times daily as needed pain.  I have reviewed the [place] Board of Pharmacy website and there are no abberancies.    4.  Follow-up in 4 weeks postprocedure or sooner as needed.    Greater than 50% of this 25 minute visit was spent on counseling the patient.

## 2019-01-31 ENCOUNTER — TELEPHONE (OUTPATIENT)
Dept: SURGERY | Facility: HOSPITAL | Age: 59
End: 2019-01-31

## 2019-02-19 ENCOUNTER — PATIENT MESSAGE (OUTPATIENT)
Dept: PAIN MEDICINE | Facility: CLINIC | Age: 59
End: 2019-02-19

## 2019-03-26 ENCOUNTER — PATIENT MESSAGE (OUTPATIENT)
Dept: PAIN MEDICINE | Facility: CLINIC | Age: 59
End: 2019-03-26

## 2019-04-15 ENCOUNTER — PATIENT MESSAGE (OUTPATIENT)
Dept: PAIN MEDICINE | Facility: CLINIC | Age: 59
End: 2019-04-15

## 2019-04-16 ENCOUNTER — TELEPHONE (OUTPATIENT)
Dept: PAIN MEDICINE | Facility: CLINIC | Age: 59
End: 2019-04-16

## 2019-04-16 NOTE — TELEPHONE ENCOUNTER
----- Message from Sandra Feng sent at 4/15/2019 12:05 PM CDT -----  Contact: self  Patient requesting to speak to Susie or Shala regarding injection appt on 04/18       Please call to advice [phone] (home)

## 2019-04-16 NOTE — TELEPHONE ENCOUNTER
Spoke with the patient and she had knee replacement and now is infected. She will be seeing the infectious disease orthopedic surgeon may 2 and will need to hold off on the injection until clear. She will call us in the beginning of May for her refill on her pain medication.

## 2019-05-07 ENCOUNTER — PATIENT MESSAGE (OUTPATIENT)
Dept: PAIN MEDICINE | Facility: CLINIC | Age: 59
End: 2019-05-07

## 2019-05-07 RX ORDER — PREGABALIN 50 MG/1
50 CAPSULE ORAL 3 TIMES DAILY
Qty: 90 CAPSULE | Refills: 2 | Status: SHIPPED | OUTPATIENT
Start: 2019-05-07 | End: 2019-09-25 | Stop reason: SDUPTHER

## 2019-05-07 RX ORDER — HYDROCODONE BITARTRATE AND ACETAMINOPHEN 10; 325 MG/1; MG/1
1 TABLET ORAL EVERY 6 HOURS PRN
Qty: 120 TABLET | Refills: 0 | Status: SHIPPED | OUTPATIENT
Start: 2019-05-07 | End: 2019-05-07 | Stop reason: SDUPTHER

## 2019-05-07 RX ORDER — HYDROCODONE BITARTRATE AND ACETAMINOPHEN 10; 325 MG/1; MG/1
1 TABLET ORAL EVERY 6 HOURS PRN
Qty: 120 TABLET | Refills: 0 | Status: SHIPPED | OUTPATIENT
Start: 2019-05-07 | End: 2019-06-03 | Stop reason: SDUPTHER

## 2019-05-07 RX ORDER — PREGABALIN 50 MG/1
50 CAPSULE ORAL 3 TIMES DAILY
Qty: 90 CAPSULE | Refills: 2 | Status: SHIPPED | OUTPATIENT
Start: 2019-05-07 | End: 2019-05-07 | Stop reason: SDUPTHER

## 2019-05-07 NOTE — TELEPHONE ENCOUNTER
----- Message from Carly Santana sent at 5/7/2019 12:19 PM CDT -----  Contact: Vi w/ [facility]  Type:  Pharmacy Calling to Clarify an RX    Name of Caller:  Vi  Pharmacy Name:  [facility]  Prescription Name:  HYDROcodone-acetaminophen (NORCO)  mg per tablet  What do they need to clarify?: na  Best Call Back Number:   [facility] - [place], [place] - [address] [address]  Phone: [phone] Fax: [phone]  Additional Information:  Calling to speak with the Nurse. The patient normally gets his medication refilled at [facility] - [place], [place]  But today it was sent to [facility]. She is requesting clinical notes and diagnosis code. Please advise.

## 2019-05-08 NOTE — TELEPHONE ENCOUNTER
Spoke with the pharmacy and they have the RX and will be getting this ready for the patient to . Patient notified via phone call.

## 2019-05-30 ENCOUNTER — TELEPHONE (OUTPATIENT)
Dept: PAIN MEDICINE | Facility: CLINIC | Age: 59
End: 2019-05-30

## 2019-05-30 NOTE — TELEPHONE ENCOUNTER
----- Message from Justin Barnes sent at 5/30/2019  9:00 AM CDT -----  Regarding: RE: Reminder for Upcoming Procedure  Contact: [phone]  Good morning Dr. Seaman  Just got a reminder for procedure on the 6th   I am having my 3rd. Surgery for my left knee replacement tomorrow 5/31/19.  Don't think you can do procedure even if I could make it there.  Don't know if you've been told but my left knee has been full of infection for about 5 months.  Finally found a doctor that seems to know and is able to correct the problem. Still have 1 more surgery three months after this one to put permanent hardware. In those three months I will be on 6 weeks of IV antibiotics.    Sure miss seeing all of you.  Hope to be able to get there soon.  Need injection so bad.  Sincerely  Justin Barnes    ----- Message -----  From: Jeffry Seaman MD  Sent: 5/30/19 8:30 AM  To: Justin Barnes  Subject: Reminder for Upcoming Procedure    [facility]  [address] [place] [address]    05/30/2019      Dear Justin,      This is a reminder for your upcoming procedure with Jeffry Seaman MD on 6/6/2019. We will contact you again before the day of your procedure with your scheduled arrival time unless you have already received this information from your physicians office.         If you have questions or scheduling concerns, you can contact your physicians office:   Jeffry Seaman MD  Phone Number: [phone]      Sincerely,     [facility]   [address] [place] [address]

## 2019-05-31 ENCOUNTER — PATIENT MESSAGE (OUTPATIENT)
Dept: PAIN MEDICINE | Facility: CLINIC | Age: 59
End: 2019-05-31

## 2019-06-03 ENCOUNTER — PATIENT MESSAGE (OUTPATIENT)
Dept: PAIN MEDICINE | Facility: CLINIC | Age: 59
End: 2019-06-03

## 2019-06-04 RX ORDER — HYDROCODONE BITARTRATE AND ACETAMINOPHEN 10; 325 MG/1; MG/1
1 TABLET ORAL EVERY 6 HOURS PRN
Qty: 120 TABLET | Refills: 0 | Status: SHIPPED | OUTPATIENT
Start: 2019-06-04 | End: 2019-07-05 | Stop reason: SDUPTHER

## 2019-07-05 ENCOUNTER — PATIENT MESSAGE (OUTPATIENT)
Dept: PAIN MEDICINE | Facility: CLINIC | Age: 59
End: 2019-07-05

## 2019-07-05 RX ORDER — HYDROCODONE BITARTRATE AND ACETAMINOPHEN 10; 325 MG/1; MG/1
1 TABLET ORAL EVERY 6 HOURS PRN
Qty: 120 TABLET | Refills: 0 | Status: SHIPPED | OUTPATIENT
Start: 2019-07-05 | End: 2019-07-31 | Stop reason: SDUPTHER

## 2019-07-30 ENCOUNTER — PATIENT MESSAGE (OUTPATIENT)
Dept: PAIN MEDICINE | Facility: CLINIC | Age: 59
End: 2019-07-30

## 2019-07-31 RX ORDER — HYDROCODONE BITARTRATE AND ACETAMINOPHEN 10; 325 MG/1; MG/1
1 TABLET ORAL EVERY 6 HOURS PRN
Qty: 120 TABLET | Refills: 0 | Status: SHIPPED | OUTPATIENT
Start: 2019-08-04 | End: 2019-07-31 | Stop reason: SDUPTHER

## 2019-07-31 RX ORDER — HYDROCODONE BITARTRATE AND ACETAMINOPHEN 10; 325 MG/1; MG/1
1 TABLET ORAL EVERY 6 HOURS PRN
Qty: 120 TABLET | Refills: 0 | Status: SHIPPED | OUTPATIENT
Start: 2019-08-03 | End: 2019-08-28 | Stop reason: SDUPTHER

## 2019-08-21 NOTE — TELEPHONE ENCOUNTER
Patient needs to reschedule her procedure on 2-5-19. Please call her to reschedule. Thanks.   [General Appearance - Alert] : alert [General Appearance - In No Acute Distress] : in no acute distress [General Appearance - Well Nourished] : well nourished [General Appearance - Well Developed] : well developed [Extraocular Movements] : extraocular movements were intact [Sclera] : the sclera and conjunctiva were normal [Outer Ear] : the ears and nose were normal in appearance [Strabismus] : no strabismus was seen [Neck Appearance] : the appearance of the neck was normal [Neck Cervical Mass (___cm)] : no neck mass was observed [Oropharynx] : the oropharynx was normal [Jugular Venous Distention Increased] : there was no jugular-venous distention [Thyroid Diffuse Enlargement] : the thyroid was not enlarged [Auscultation Breath Sounds / Voice Sounds] : lungs were clear to auscultation bilaterally [Heart Rate And Rhythm] : heart rate was normal and rhythm regular [Heart Sounds Gallop] : no gallops [Heart Sounds] : normal S1 and S2 [Murmurs] : no murmurs [Heart Sounds Pericardial Friction Rub] : no pericardial rub [Abdomen Soft] : soft [Bowel Sounds] : normal bowel sounds [Abdomen Tenderness] : non-tender [Cervical Lymph Nodes Enlarged Posterior Bilaterally] : posterior cervical [Abdomen Mass (___ Cm)] : no abdominal mass palpated [Supraclavicular Lymph Nodes Enlarged Bilaterally] : supraclavicular [Cervical Lymph Nodes Enlarged Anterior Bilaterally] : anterior cervical [No CVA Tenderness] : no ~M costovertebral angle tenderness [No Spinal Tenderness] : no spinal tenderness [Abnormal Walk] : normal gait [Nail Clubbing] : no clubbing  or cyanosis of the fingernails [Musculoskeletal - Swelling] : no joint swelling seen [Motor Tone] : muscle strength and tone were normal [Skin Color & Pigmentation] : normal skin color and pigmentation [Skin Turgor] : normal skin turgor [] : no rash [Sensation] : the sensory exam was normal to light touch and pinprick [Motor Exam] : the motor exam was normal [Oriented To Time, Place, And Person] : oriented to person, place, and time [Affect] : the affect was normal [Impaired Insight] : insight and judgment were intact

## 2019-08-28 ENCOUNTER — PATIENT MESSAGE (OUTPATIENT)
Dept: PAIN MEDICINE | Facility: CLINIC | Age: 59
End: 2019-08-28

## 2019-08-28 RX ORDER — HYDROCODONE BITARTRATE AND ACETAMINOPHEN 10; 325 MG/1; MG/1
1 TABLET ORAL EVERY 6 HOURS PRN
Qty: 120 TABLET | Refills: 0 | Status: SHIPPED | OUTPATIENT
Start: 2019-08-31 | End: 2019-09-25 | Stop reason: SDUPTHER

## 2019-08-30 ENCOUNTER — PATIENT MESSAGE (OUTPATIENT)
Dept: PAIN MEDICINE | Facility: CLINIC | Age: 59
End: 2019-08-30

## 2019-09-25 ENCOUNTER — PATIENT MESSAGE (OUTPATIENT)
Dept: PAIN MEDICINE | Facility: CLINIC | Age: 59
End: 2019-09-25

## 2019-09-25 RX ORDER — PREGABALIN 50 MG/1
50 CAPSULE ORAL 3 TIMES DAILY
Qty: 90 CAPSULE | Refills: 2 | Status: SHIPPED | OUTPATIENT
Start: 2019-09-25 | End: 2020-02-06 | Stop reason: SDUPTHER

## 2019-09-25 RX ORDER — HYDROCODONE BITARTRATE AND ACETAMINOPHEN 10; 325 MG/1; MG/1
1 TABLET ORAL EVERY 6 HOURS PRN
Qty: 120 TABLET | Refills: 0 | Status: SHIPPED | OUTPATIENT
Start: 2019-09-27 | End: 2019-10-25 | Stop reason: SDUPTHER

## 2019-10-25 ENCOUNTER — PATIENT MESSAGE (OUTPATIENT)
Dept: PAIN MEDICINE | Facility: CLINIC | Age: 59
End: 2019-10-25

## 2019-10-27 RX ORDER — HYDROCODONE BITARTRATE AND ACETAMINOPHEN 10; 325 MG/1; MG/1
1 TABLET ORAL EVERY 6 HOURS PRN
Qty: 120 TABLET | Refills: 0 | Status: SHIPPED | OUTPATIENT
Start: 2019-10-27 | End: 2019-11-20 | Stop reason: SDUPTHER

## 2019-10-28 ENCOUNTER — PATIENT MESSAGE (OUTPATIENT)
Dept: PAIN MEDICINE | Facility: CLINIC | Age: 59
End: 2019-10-28

## 2019-11-20 ENCOUNTER — PATIENT MESSAGE (OUTPATIENT)
Dept: PAIN MEDICINE | Facility: CLINIC | Age: 59
End: 2019-11-20

## 2019-11-20 RX ORDER — HYDROCODONE BITARTRATE AND ACETAMINOPHEN 10; 325 MG/1; MG/1
1 TABLET ORAL EVERY 6 HOURS PRN
Qty: 120 TABLET | Refills: 0 | Status: SHIPPED | OUTPATIENT
Start: 2019-11-26 | End: 2019-12-17 | Stop reason: SDUPTHER

## 2019-12-17 ENCOUNTER — PATIENT MESSAGE (OUTPATIENT)
Dept: PAIN MEDICINE | Facility: CLINIC | Age: 59
End: 2019-12-17

## 2019-12-17 RX ORDER — HYDROCODONE BITARTRATE AND ACETAMINOPHEN 10; 325 MG/1; MG/1
1 TABLET ORAL EVERY 6 HOURS PRN
Qty: 120 TABLET | Refills: 0 | Status: SHIPPED | OUTPATIENT
Start: 2019-12-26 | End: 2020-01-17 | Stop reason: SDUPTHER

## 2019-12-17 NOTE — TELEPHONE ENCOUNTER
I wrote the last Rx on 11/20 to fill on 11/26 so she is not due until 12/26. If her pharmacy filled early, they should not have and she should have pills left over unless she took more than prescribed. Furthermore, she received scripts from another physician at the same time.

## 2020-01-17 ENCOUNTER — PATIENT MESSAGE (OUTPATIENT)
Dept: PAIN MEDICINE | Facility: CLINIC | Age: 60
End: 2020-01-17

## 2020-01-17 ENCOUNTER — TELEPHONE (OUTPATIENT)
Dept: PAIN MEDICINE | Facility: CLINIC | Age: 60
End: 2020-01-17

## 2020-01-17 RX ORDER — HYDROCODONE BITARTRATE AND ACETAMINOPHEN 10; 325 MG/1; MG/1
1 TABLET ORAL EVERY 6 HOURS PRN
Qty: 120 TABLET | Refills: 0 | Status: SHIPPED | OUTPATIENT
Start: 2020-01-25 | End: 2020-01-19 | Stop reason: SDUPTHER

## 2020-01-17 NOTE — TELEPHONE ENCOUNTER
Patient is asking for a refill on pain medication. Its been a year since we have seen her. Please advise. Thanks.

## 2020-01-18 NOTE — TELEPHONE ENCOUNTER
----- Message from Kieran López sent at 1/17/2020  3:44 PM CST -----  Contact: Gladys with [facility]  Type:  Pharmacy Calling to Clarify an RX    Name of Caller:  Gladys  Pharm[facility]  Prescription Name:  HYDROcodone-acetaminophen (NORCO)  mg per tablet  What do they need to clarify?:  Needs Dr / pt  Relationship and start date  Best Call Back Number:  [phone]  Additional Information:  Please call to advise

## 2020-01-19 RX ORDER — HYDROCODONE BITARTRATE AND ACETAMINOPHEN 10; 325 MG/1; MG/1
1 TABLET ORAL EVERY 6 HOURS PRN
Qty: 120 TABLET | Refills: 0 | Status: SHIPPED | OUTPATIENT
Start: 2020-01-25 | End: 2020-02-06 | Stop reason: SDUPTHER

## 2020-01-20 ENCOUNTER — TELEPHONE (OUTPATIENT)
Dept: PAIN MEDICINE | Facility: CLINIC | Age: 60
End: 2020-01-20

## 2020-01-20 NOTE — TELEPHONE ENCOUNTER
----- Message from Carlos Ortiz sent at 1/20/2020  4:21 PM CST -----  Type:  Pharmacy Calling to Clarify an RX    Name of Caller:  Gladys  Pharmacy Name:    [facility] [place]      Prescription Name:  HYDROcodone-acetaminophen (NORCO)  mg per tablet  What do they need to clarify?: Caller states that the pharmacy needs to know if the provider had spoken to the patient about why the pharmacy won't fill her prescription.   Best Call Back Number:  [phone]  Additional Information:

## 2020-01-20 NOTE — TELEPHONE ENCOUNTER
Please let the patient know that if she feels narcotics from other providers again.  If she is going to receive any narcotics from other providers she needs to tell us.  Also she does not follow-up and make an appointment, we can no longer provide her any medication.

## 2020-01-20 NOTE — TELEPHONE ENCOUNTER
----- Message from Kings Schafer sent at 1/20/2020 10:19 AM CST -----  Contact: Estelle with [facility]  Type:  Pharmacy Calling to Clarify an RX    Name of Caller:  Estelle  Pharmacy Name:  [facility]  Prescription Name:  HYDROcodone-acetaminophen (NORCO)  mg per tablet  What do they need to clarify?:  Pt has been getting other pain medication filled from other providers.   Best Call Back Number:  [phone]  Additional Information:

## 2020-01-21 NOTE — TELEPHONE ENCOUNTER
"Spoke with patient and informed her of this information. She stated "well if someone would check that has already been done and no I am not getting medication from another doctor." Patient has an appointment scheduled.     "

## 2020-02-06 ENCOUNTER — OFFICE VISIT (OUTPATIENT)
Dept: PAIN MEDICINE | Facility: CLINIC | Age: 60
End: 2020-02-06
Payer: MEDICARE

## 2020-02-06 VITALS
WEIGHT: 202.63 LBS | TEMPERATURE: 97 F | DIASTOLIC BLOOD PRESSURE: 70 MMHG | SYSTOLIC BLOOD PRESSURE: 148 MMHG | RESPIRATION RATE: 20 BRPM | HEART RATE: 71 BPM | OXYGEN SATURATION: 97 % | BODY MASS INDEX: 33.71 KG/M2

## 2020-02-06 DIAGNOSIS — Z79.891 OPIOID CONTRACT EXISTS: ICD-10-CM

## 2020-02-06 DIAGNOSIS — M48.02 CERVICAL STENOSIS OF SPINAL CANAL: ICD-10-CM

## 2020-02-06 DIAGNOSIS — M54.12 CERVICAL RADICULOPATHY: Primary | ICD-10-CM

## 2020-02-06 DIAGNOSIS — M54.16 LUMBAR RADICULOPATHY: ICD-10-CM

## 2020-02-06 DIAGNOSIS — M96.1 POSTLAMINECTOMY SYNDROME OF LUMBAR REGION: ICD-10-CM

## 2020-02-06 PROCEDURE — 3078F DIAST BP <80 MM HG: CPT | Mod: CPTII,S$GLB,, | Performed by: PHYSICIAN ASSISTANT

## 2020-02-06 PROCEDURE — 99999 PR PBB SHADOW E&M-EST. PATIENT-LVL V: CPT | Mod: PBBFAC,,, | Performed by: PHYSICIAN ASSISTANT

## 2020-02-06 PROCEDURE — 3008F BODY MASS INDEX DOCD: CPT | Mod: CPTII,S$GLB,, | Performed by: PHYSICIAN ASSISTANT

## 2020-02-06 PROCEDURE — 3008F PR BODY MASS INDEX (BMI) DOCUMENTED: ICD-10-PCS | Mod: CPTII,S$GLB,, | Performed by: PHYSICIAN ASSISTANT

## 2020-02-06 PROCEDURE — 3078F PR MOST RECENT DIASTOLIC BLOOD PRESSURE < 80 MM HG: ICD-10-PCS | Mod: CPTII,S$GLB,, | Performed by: PHYSICIAN ASSISTANT

## 2020-02-06 PROCEDURE — 99214 PR OFFICE/OUTPT VISIT, EST, LEVL IV, 30-39 MIN: ICD-10-PCS | Mod: S$GLB,,, | Performed by: PHYSICIAN ASSISTANT

## 2020-02-06 PROCEDURE — 99999 PR PBB SHADOW E&M-EST. PATIENT-LVL V: ICD-10-PCS | Mod: PBBFAC,,, | Performed by: PHYSICIAN ASSISTANT

## 2020-02-06 PROCEDURE — 80307 DRUG TEST PRSMV CHEM ANLYZR: CPT

## 2020-02-06 PROCEDURE — 99214 OFFICE O/P EST MOD 30 MIN: CPT | Mod: S$GLB,,, | Performed by: PHYSICIAN ASSISTANT

## 2020-02-06 PROCEDURE — 3077F PR MOST RECENT SYSTOLIC BLOOD PRESSURE >= 140 MM HG: ICD-10-PCS | Mod: CPTII,S$GLB,, | Performed by: PHYSICIAN ASSISTANT

## 2020-02-06 PROCEDURE — 3077F SYST BP >= 140 MM HG: CPT | Mod: CPTII,S$GLB,, | Performed by: PHYSICIAN ASSISTANT

## 2020-02-06 RX ORDER — HYDROCODONE BITARTRATE AND ACETAMINOPHEN 10; 325 MG/1; MG/1
1 TABLET ORAL EVERY 6 HOURS PRN
Qty: 120 TABLET | Refills: 0 | Status: SHIPPED | OUTPATIENT
Start: 2020-03-25 | End: 2020-04-24

## 2020-02-06 RX ORDER — HYDRALAZINE HYDROCHLORIDE 25 MG/1
50 TABLET, FILM COATED ORAL 2 TIMES DAILY
COMMUNITY

## 2020-02-06 RX ORDER — HYDROCODONE BITARTRATE AND ACETAMINOPHEN 10; 325 MG/1; MG/1
1 TABLET ORAL EVERY 6 HOURS PRN
Qty: 120 TABLET | Refills: 0 | Status: SHIPPED | OUTPATIENT
Start: 2020-04-24 | End: 2020-05-20 | Stop reason: SDUPTHER

## 2020-02-06 RX ORDER — SPIRONOLACTONE 25 MG/1
25 TABLET ORAL DAILY
Status: ON HOLD | COMMUNITY
End: 2020-02-14

## 2020-02-06 RX ORDER — HYDROCODONE BITARTRATE AND ACETAMINOPHEN 10; 325 MG/1; MG/1
1 TABLET ORAL EVERY 6 HOURS PRN
Qty: 120 TABLET | Refills: 0 | Status: SHIPPED | OUTPATIENT
Start: 2020-02-24 | End: 2020-03-25

## 2020-02-06 RX ORDER — PREGABALIN 50 MG/1
50 CAPSULE ORAL 3 TIMES DAILY
Qty: 90 CAPSULE | Refills: 2 | Status: SHIPPED | OUTPATIENT
Start: 2020-02-06 | End: 2020-03-19

## 2020-02-06 RX ORDER — SODIUM CHLORIDE, SODIUM LACTATE, POTASSIUM CHLORIDE, CALCIUM CHLORIDE 600; 310; 30; 20 MG/100ML; MG/100ML; MG/100ML; MG/100ML
INJECTION, SOLUTION INTRAVENOUS CONTINUOUS
Status: CANCELLED | OUTPATIENT
Start: 2020-02-14

## 2020-02-06 RX ORDER — PIOGLITAZONEHYDROCHLORIDE 15 MG/1
15 TABLET ORAL DAILY
COMMUNITY

## 2020-02-09 NOTE — H&P (VIEW-ONLY)
This note was completed with dictation software and grammatical errors may exist.    CC: Neck and bilateral arm pain, back pain, knee pain     HPI: The patient is a 59-year-old woman with a history of hypertension, diabetes, multiple sclerosis, lumbar postlaminectomy syndrome who presents in self-referral for continued low back pain and bilateral leg pain, neck pain. She returns in follow-up today with neck pain and back pain. Since her last visit she reports having 4 more knee surgeries due to infection.  Her last surgery was on 11/05/2019.  Her main complaint is bilateral shoulder and arm pain. She does have neck pain but is not as severe as her radicular pain. She also reports pain in the lower thoracic region and across the lower lumbar region.  She also complains of bilateral foot pain. She reports relief with her pain medication.  She reports weakness and numbness in her arms.  She denies bladder or bowel incontinence.    Pain intervention history: She has been followed by one of our previous [facility] pain management physicians, Dr. Chapa in [place] until recently.  She had undergone fusion surgery in 1983 and again surgery in 1993 for her lumbar spine.  She had undergone numerous lumbar spine injections in the past with good relief.  She has tried gabapentin with only minimal relief but states that Lyrica works best.  Sounds like she had a spinal cord stimulator trial in the past that did not help.  She has been taking hydrocodone up to 4 times a day for several years.  She is status post caudal epidural steroid injection on 8/6/14 with 60% relief.  She is status post caudal epidural steroid injection on 5/13/15 with 0% relief.  She is status post caudal epidural steroid injection on 10/2/15 with 0% relief.  She is status post C7-T1 cervical interlaminar epidural steroid injection on 9/7/16 with 100% relief of her bilateral arm pain and mild relief of her neck pain.  She is status post C7-T1 cervical  interlaminar epidural steroid injection on 2/21/17 with at least 50% relief.  She is status post bilateral L1 transforaminal epidural steroid injections on 3/20/17 with 75% relief. She is status post C7-T1 cervical interlaminar epidural steroid injection on 12/22/17 and again on 2/9/18 with 90% relief of her neck and arm pain.  She is status post bilateral L1/2 transforaminal epidural steroid injections on 08/08/2018 with almost 100% relief of her low back pain.    ROS:She reports urinary frequency, joint stiffness, joint swelling, back pain, memory loss, anxiety, depression, difficulty sleeping and loss of balance.  Balance of review of systems is negative.    Medical, surgical, family and social history reviewed elsewhere in record.    Medications/Allergies: See med card    Vitals:    02/06/20 1027   BP: (!) 148/70   Pulse: 71   Resp: 20   Temp: 96.9 °F (36.1 °C)   TempSrc: Oral   SpO2: 97%   Weight: 91.9 kg (202 lb 9.6 oz)   PainSc:   8   PainLoc: Neck         Physical exam:  Gen: A and O x3, pleasant, well-groomed  Skin: No rashes or obvious lesions  HEENT: PERRLA  CVS: Regular rate and rhythm, normal S1 and S2, no murmurs.    Resp: Clear to auscultation bilaterally, no wheezes or rales.  Abdomen: Soft, NT/ND, normal bowel sounds present.  Musculoskeletal: Antalgic gait, slow cautious, kyphotic stance.      Neuro:  Upper extremities: 5/5 strength bilaterally, except for 4/5  strength  Lower extremities: 5/5 strength bilaterally, except for 4/5 left foot dorsiflexion  Reflexes: Brachioradialis 0+, Bicep 1+, Tricep 0+. Patellar 0+, Achilles 0+ bilaterally.  Sensory: Intact and symmetrical to light touch and pinprick in C2-T1 dermatomes bilaterally.Intact and symmetrical to light touch and pinprick in L2-S1 dermatomes bilaterally.    Cervical spine:   Range of motion mildly reduced with flexion, extension and lateral rotation with increased bilateral neck pain during each maneuver.  Spurling's test positive for  bilateral neck and shoulder pain  Myofascial exam: Mild tenderness palpation to the cervical paraspinous muscles.    Lumbar spine:  Lumbar spine: ROM is moderately reduced with flexion extension with mild increased low back pain during each maneuver.  Jeff's test causes left knee pain only.  Supine straight leg raise is negative bilaterally.    Internal and external rotation of the hip is deferred due to left knee pain and right hip pain.  Myofascial exam:  No tenderness to palpation to the lumbar paraspinous muscles.  Mild tenderness to palpation to the lower thoracic paraspinous muscles.      Imaging:  MRI C-spine 10/10/17  Comparison MRI cervical spine 08/16/2016.  A minimal reversal of the mid cervical curvature again noted with anatomic alignment.  Degenerative changes of the discs including loss of height and desiccation also again noted at C4-5 and C5-6.  C3-4, moderate right facet arthropathy.  C4-5, bulging of the disc again noted with effacement of the adjacent ventral subarachnoid space.  C5-6, bulging of the disc again noted with superimposed small more focal right paracentral protrusion of the disc with complete effacement of ventral subarachnoid space and flattening of the ventral cord more so on the right, unchanged.  Overall mild degree of canal stenosis results.  Bilateral uncinate hypertrophy is present with moderate to severe bilateral foraminal stenosis.  C6-7, mild annular bulging slightly asymmetric to the left posteriorly.  C7-T1, small posterior midline/right prominent midline protrusion, unchanged.  Mild annular bulging also suspected T1-2.  The cervical cord maintains normal signal throughout.  No abnormal intra-dural enhancement is seen.    MRI lumbar spine 2/1/17  There are postoperative changes of posterior instrumented fusion at L2-S1.  There is a grade I anterolisthesis of L5 on S1.  The lumbar vertebral bodies show normal height without evidence of acute compression fracture or  pathologic marrow replacement process.  There is a levoconvex curvature of the lumbar spine. There is degenerative desiccation, disc space narrowing, and degenerative endplate change at T11-T12, T12-L1, L1-L2, and L5-S1.  There is near complete fusion of the L2, L3, L4, and L5 vertebral bodies.  There are probable postoperative changes of posterior decompression at L2-S1. The conus medullaris terminates at L1. The visualized retroperitoneal/abdominal soft tissue  structures are unremarkable.  T11-T12: There is a broad disc bulge which flattens the ventral aspect of the conus.  There is ligamentum flavum thickening and facet arthropathy.  There is moderate bilateral neuroforaminal stenosis present.  There is mild-moderate central canal stenosis.  T12-L1: There is a broad disc bulge with a superimposed ascending right paracentral disc extrusion which extends approximately 10 mm above the disc plane.  There is mild-moderate central canal stenosis present.  There is mild right neuroforaminal stenosis.  L1-L2: There is a broad disc bulge throughout the period there is ligamentum flavum thickening.  The central canal is obscured.  There is a suggestion of a possible right paracentral disc protrusion.  There is mild-moderate overall central canal stenosis.  The neural foramina are not assessed secondary to metal artifact.  L2-L3: No central canal or neuroforaminal stenosis. No disc protrusion or extrusion.  L3-L4: No central canal or neuroforaminal stenosis. No disc protrusion or extrusion.  L4-L5: The left neural foramen is not optimally evaluated due to metal artifact.  No central canal stenosis or right neuroforaminal stenosis.  L5-S1: There is a grade I anterolisthesis of L5 on S1 with associated uncovering of the intervertebral disc.  No definite central canal stenosis or neuroforaminal stenosis.      Assessment:  The patient is a 59-year-old woman with a history of hypertension, diabetes, multiple sclerosis, lumbar  postlaminectomy syndrome who presents in self-referral for continued low back pain and bilateral leg pain, neck pain.   1. Cervical radiculopathy  Vital signs    Place 18-22 gaua peripheral IV     Verify informed consent    Notify physician     Notify physician     Notify physician (specify)    Diet NPO    Case Request Operating Room: Injection-steroid-epidural-cervical    Place in Outpatient    lactated ringers infusion   2. Lumbar radiculopathy     3. Postlaminectomy syndrome of lumbar region     4. Cervical stenosis of spinal canal     5. Opioid contract exists  Pain Clinic Drug Screen                                                                                                                                                                                               Plan:  1.  I will set the patient up to repeat a C7-T1 interlaminar epidural steroid injection. She has done well with this in the past.  2.  Dr. Seaman provided prescriptions for hydrocodone-acetaminophen 10/325 milligrams up to 4 times daily as needed for pain as well as Lyrica 50 milligrams 3 times a day as needed and Lyrica 50 milligrams 3 times a day.   a urine drug screen was completed today.  I have reviewed the [place] Board of Pharmacy website and there are no abberancies.    3.  Follow-up in 4 weeks postprocedure sooner as needed.    Greater than 50% of this 25 minutes visit was spent counseling the patient.

## 2020-02-09 NOTE — PROGRESS NOTES
This note was completed with dictation software and grammatical errors may exist.    CC: Neck and bilateral arm pain, back pain, knee pain     HPI: The patient is a 59-year-old woman with a history of hypertension, diabetes, multiple sclerosis, lumbar postlaminectomy syndrome who presents in self-referral for continued low back pain and bilateral leg pain, neck pain. She returns in follow-up today with neck pain and back pain. Since her last visit she reports having 4 more knee surgeries due to infection.  Her last surgery was on 11/05/2019.  Her main complaint is bilateral shoulder and arm pain. She does have neck pain but is not as severe as her radicular pain. She also reports pain in the lower thoracic region and across the lower lumbar region.  She also complains of bilateral foot pain. She reports relief with her pain medication.  She reports weakness and numbness in her arms.  She denies bladder or bowel incontinence.    Pain intervention history: She has been followed by one of our previous [facility] pain management physicians, Dr. Chapa in [place] until recently.  She had undergone fusion surgery in 1983 and again surgery in 1993 for her lumbar spine.  She had undergone numerous lumbar spine injections in the past with good relief.  She has tried gabapentin with only minimal relief but states that Lyrica works best.  Sounds like she had a spinal cord stimulator trial in the past that did not help.  She has been taking hydrocodone up to 4 times a day for several years.  She is status post caudal epidural steroid injection on 8/6/14 with 60% relief.  She is status post caudal epidural steroid injection on 5/13/15 with 0% relief.  She is status post caudal epidural steroid injection on 10/2/15 with 0% relief.  She is status post C7-T1 cervical interlaminar epidural steroid injection on 9/7/16 with 100% relief of her bilateral arm pain and mild relief of her neck pain.  She is status post C7-T1 cervical  interlaminar epidural steroid injection on 2/21/17 with at least 50% relief.  She is status post bilateral L1 transforaminal epidural steroid injections on 3/20/17 with 75% relief. She is status post C7-T1 cervical interlaminar epidural steroid injection on 12/22/17 and again on 2/9/18 with 90% relief of her neck and arm pain.  She is status post bilateral L1/2 transforaminal epidural steroid injections on 08/08/2018 with almost 100% relief of her low back pain.    ROS:She reports urinary frequency, joint stiffness, joint swelling, back pain, memory loss, anxiety, depression, difficulty sleeping and loss of balance.  Balance of review of systems is negative.    Medical, surgical, family and social history reviewed elsewhere in record.    Medications/Allergies: See med card    Vitals:    02/06/20 1027   BP: (!) 148/70   Pulse: 71   Resp: 20   Temp: 96.9 °F (36.1 °C)   TempSrc: Oral   SpO2: 97%   Weight: 91.9 kg (202 lb 9.6 oz)   PainSc:   8   PainLoc: Neck         Physical exam:  Gen: A and O x3, pleasant, well-groomed  Skin: No rashes or obvious lesions  HEENT: PERRLA  CVS: Regular rate and rhythm, normal S1 and S2, no murmurs.    Resp: Clear to auscultation bilaterally, no wheezes or rales.  Abdomen: Soft, NT/ND, normal bowel sounds present.  Musculoskeletal: Antalgic gait, slow cautious, kyphotic stance.      Neuro:  Upper extremities: 5/5 strength bilaterally, except for 4/5  strength  Lower extremities: 5/5 strength bilaterally, except for 4/5 left foot dorsiflexion  Reflexes: Brachioradialis 0+, Bicep 1+, Tricep 0+. Patellar 0+, Achilles 0+ bilaterally.  Sensory: Intact and symmetrical to light touch and pinprick in C2-T1 dermatomes bilaterally.Intact and symmetrical to light touch and pinprick in L2-S1 dermatomes bilaterally.    Cervical spine:   Range of motion mildly reduced with flexion, extension and lateral rotation with increased bilateral neck pain during each maneuver.  Spurling's test positive for  bilateral neck and shoulder pain  Myofascial exam: Mild tenderness palpation to the cervical paraspinous muscles.    Lumbar spine:  Lumbar spine: ROM is moderately reduced with flexion extension with mild increased low back pain during each maneuver.  Jeff's test causes left knee pain only.  Supine straight leg raise is negative bilaterally.    Internal and external rotation of the hip is deferred due to left knee pain and right hip pain.  Myofascial exam:  No tenderness to palpation to the lumbar paraspinous muscles.  Mild tenderness to palpation to the lower thoracic paraspinous muscles.      Imaging:  MRI C-spine 10/10/17  Comparison MRI cervical spine 08/16/2016.  A minimal reversal of the mid cervical curvature again noted with anatomic alignment.  Degenerative changes of the discs including loss of height and desiccation also again noted at C4-5 and C5-6.  C3-4, moderate right facet arthropathy.  C4-5, bulging of the disc again noted with effacement of the adjacent ventral subarachnoid space.  C5-6, bulging of the disc again noted with superimposed small more focal right paracentral protrusion of the disc with complete effacement of ventral subarachnoid space and flattening of the ventral cord more so on the right, unchanged.  Overall mild degree of canal stenosis results.  Bilateral uncinate hypertrophy is present with moderate to severe bilateral foraminal stenosis.  C6-7, mild annular bulging slightly asymmetric to the left posteriorly.  C7-T1, small posterior midline/right prominent midline protrusion, unchanged.  Mild annular bulging also suspected T1-2.  The cervical cord maintains normal signal throughout.  No abnormal intra-dural enhancement is seen.    MRI lumbar spine 2/1/17  There are postoperative changes of posterior instrumented fusion at L2-S1.  There is a grade I anterolisthesis of L5 on S1.  The lumbar vertebral bodies show normal height without evidence of acute compression fracture or  pathologic marrow replacement process.  There is a levoconvex curvature of the lumbar spine. There is degenerative desiccation, disc space narrowing, and degenerative endplate change at T11-T12, T12-L1, L1-L2, and L5-S1.  There is near complete fusion of the L2, L3, L4, and L5 vertebral bodies.  There are probable postoperative changes of posterior decompression at L2-S1. The conus medullaris terminates at L1. The visualized retroperitoneal/abdominal soft tissue  structures are unremarkable.  T11-T12: There is a broad disc bulge which flattens the ventral aspect of the conus.  There is ligamentum flavum thickening and facet arthropathy.  There is moderate bilateral neuroforaminal stenosis present.  There is mild-moderate central canal stenosis.  T12-L1: There is a broad disc bulge with a superimposed ascending right paracentral disc extrusion which extends approximately 10 mm above the disc plane.  There is mild-moderate central canal stenosis present.  There is mild right neuroforaminal stenosis.  L1-L2: There is a broad disc bulge throughout the period there is ligamentum flavum thickening.  The central canal is obscured.  There is a suggestion of a possible right paracentral disc protrusion.  There is mild-moderate overall central canal stenosis.  The neural foramina are not assessed secondary to metal artifact.  L2-L3: No central canal or neuroforaminal stenosis. No disc protrusion or extrusion.  L3-L4: No central canal or neuroforaminal stenosis. No disc protrusion or extrusion.  L4-L5: The left neural foramen is not optimally evaluated due to metal artifact.  No central canal stenosis or right neuroforaminal stenosis.  L5-S1: There is a grade I anterolisthesis of L5 on S1 with associated uncovering of the intervertebral disc.  No definite central canal stenosis or neuroforaminal stenosis.      Assessment:  The patient is a 59-year-old woman with a history of hypertension, diabetes, multiple sclerosis, lumbar  postlaminectomy syndrome who presents in self-referral for continued low back pain and bilateral leg pain, neck pain.   1. Cervical radiculopathy  Vital signs    Place 18-22 gaua peripheral IV     Verify informed consent    Notify physician     Notify physician     Notify physician (specify)    Diet NPO    Case Request Operating Room: Injection-steroid-epidural-cervical    Place in Outpatient    lactated ringers infusion   2. Lumbar radiculopathy     3. Postlaminectomy syndrome of lumbar region     4. Cervical stenosis of spinal canal     5. Opioid contract exists  Pain Clinic Drug Screen                                                                                                                                                                                               Plan:  1.  I will set the patient up to repeat a C7-T1 interlaminar epidural steroid injection. She has done well with this in the past.  2.  Dr. Seaman provided prescriptions for hydrocodone-acetaminophen 10/325 milligrams up to 4 times daily as needed for pain as well as Lyrica 50 milligrams 3 times a day as needed and Lyrica 50 milligrams 3 times a day.   a urine drug screen was completed today.  I have reviewed the [place] Board of Pharmacy website and there are no abberancies.    3.  Follow-up in 4 weeks postprocedure sooner as needed.    Greater than 50% of this 25 minutes visit was spent counseling the patient.

## 2020-02-11 LAB
6MAM UR QL: NOT DETECTED
7AMINOCLONAZEPAM UR QL: NOT DETECTED
A-OH ALPRAZ UR QL: NOT DETECTED
ALPRAZ UR QL: NOT DETECTED
AMPHET UR QL SCN: NOT DETECTED
ANNOTATION COMMENT IMP: NORMAL
ANNOTATION COMMENT IMP: NORMAL
BARBITURATES UR QL: NOT DETECTED
BUPRENORPHINE UR QL: NOT DETECTED
BZE UR QL: NOT DETECTED
CARBOXYTHC UR QL: NOT DETECTED
CARISOPRODOL UR QL: NOT DETECTED
CLONAZEPAM UR QL: NOT DETECTED
CODEINE UR QL: NOT DETECTED
CREAT UR-MCNC: 51.2 MG/DL (ref 20–400)
DIAZEPAM UR QL: NOT DETECTED
ETHYL GLUCURONIDE UR QL: NOT DETECTED
FENTANYL UR QL: NOT DETECTED
HYDROCODONE UR QL: PRESENT
HYDROMORPHONE UR QL: NOT DETECTED
LORAZEPAM UR QL: NOT DETECTED
MDA UR QL: NOT DETECTED
MDEA UR QL: NOT DETECTED
MDMA UR QL: NOT DETECTED
ME-PHENIDATE UR QL: NOT DETECTED
MEPERIDINE UR QL: NOT DETECTED
METHADONE UR QL: NOT DETECTED
METHAMPHET UR QL: NOT DETECTED
MIDAZOLAM UR QL SCN: NOT DETECTED
MORPHINE UR QL: NOT DETECTED
NORBUPRENORPHINE UR QL CFM: NOT DETECTED
NORDIAZEPAM UR QL: NOT DETECTED
NORFENTANYL UR QL: NOT DETECTED
NORHYDROCODONE UR QL CFM: PRESENT
NOROXYCODONE UR QL CFM: NOT DETECTED
NOROXYMORPHONE: NOT DETECTED
OXAZEPAM UR QL: NOT DETECTED
OXYCODONE UR QL: NOT DETECTED
OXYMORPHONE UR QL: NOT DETECTED
PATHOLOGY STUDY: NORMAL
PCP UR QL: NOT DETECTED
PHENTERMINE UR QL: NOT DETECTED
PROPOXYPH UR QL: NOT DETECTED
SERVICE CMNT-IMP: NORMAL
TAPENTADOL UR QL SCN: NOT DETECTED
TAPENTADOL-O-SULF: NOT DETECTED
TEMAZEPAM UR QL: NOT DETECTED
TRAMADOL UR QL: NOT DETECTED
ZOLPIDEM UR QL: NOT DETECTED

## 2020-02-13 ENCOUNTER — TELEPHONE (OUTPATIENT)
Dept: PAIN MEDICINE | Facility: CLINIC | Age: 60
End: 2020-02-13

## 2020-02-13 NOTE — TELEPHONE ENCOUNTER
----- Message from Ruth Lord sent at 2/13/2020  2:20 PM CST -----  Type: Needs Medical Advice    Who Called:  Patient  Best Call Back Number: [phone] (home)     Additional Information: Wants to know if the insurance approved her procedure tomorrow. Please call to advise.

## 2020-02-14 ENCOUNTER — HOSPITAL ENCOUNTER (OUTPATIENT)
Facility: HOSPITAL | Age: 60
Discharge: HOME | End: 2020-02-14
Attending: ANESTHESIOLOGY | Admitting: ANESTHESIOLOGY
Payer: MEDICARE

## 2020-02-14 ENCOUNTER — HOSPITAL ENCOUNTER (OUTPATIENT)
Dept: RADIOLOGY | Facility: HOSPITAL | Age: 60
Discharge: HOME | End: 2020-02-14
Attending: ANESTHESIOLOGY | Admitting: ANESTHESIOLOGY
Payer: MEDICARE

## 2020-02-14 VITALS
BODY MASS INDEX: 32.95 KG/M2 | WEIGHT: 198 LBS | DIASTOLIC BLOOD PRESSURE: 63 MMHG | RESPIRATION RATE: 17 BRPM | TEMPERATURE: 97 F | SYSTOLIC BLOOD PRESSURE: 138 MMHG | OXYGEN SATURATION: 97 % | HEART RATE: 83 BPM

## 2020-02-14 DIAGNOSIS — M54.12 CERVICAL RADICULOPATHY: ICD-10-CM

## 2020-02-14 LAB — GLUCOSE SERPL-MCNC: 91 MG/DL (ref 70–110)

## 2020-02-14 PROCEDURE — 62321 NJX INTERLAMINAR CRV/THRC: CPT | Mod: PO | Performed by: ANESTHESIOLOGY

## 2020-02-14 PROCEDURE — 99152 MOD SED SAME PHYS/QHP 5/>YRS: CPT | Mod: ,,, | Performed by: ANESTHESIOLOGY

## 2020-02-14 PROCEDURE — 63600175 PHARM REV CODE 636 W HCPCS: Mod: PO | Performed by: PHYSICIAN ASSISTANT

## 2020-02-14 PROCEDURE — 25000003 PHARM REV CODE 250: Mod: PO | Performed by: ANESTHESIOLOGY

## 2020-02-14 PROCEDURE — 62321 NJX INTERLAMINAR CRV/THRC: CPT | Mod: ,,, | Performed by: ANESTHESIOLOGY

## 2020-02-14 PROCEDURE — 63600175 PHARM REV CODE 636 W HCPCS: Mod: PO | Performed by: ANESTHESIOLOGY

## 2020-02-14 PROCEDURE — 62321 PR INJ CERV/THORAC, W/GUIDANCE: ICD-10-PCS | Mod: ,,, | Performed by: ANESTHESIOLOGY

## 2020-02-14 PROCEDURE — A4216 STERILE WATER/SALINE, 10 ML: HCPCS | Mod: PO | Performed by: ANESTHESIOLOGY

## 2020-02-14 PROCEDURE — 25500020 PHARM REV CODE 255: Mod: PO | Performed by: ANESTHESIOLOGY

## 2020-02-14 PROCEDURE — 99152 PR MOD CONSCIOUS SEDATION, SAME PHYS, 5+ YRS, FIRST 15 MIN: ICD-10-PCS | Mod: ,,, | Performed by: ANESTHESIOLOGY

## 2020-02-14 PROCEDURE — 82962 GLUCOSE BLOOD TEST: CPT | Mod: PO | Performed by: ANESTHESIOLOGY

## 2020-02-14 PROCEDURE — 76000 FLUOROSCOPY <1 HR PHYS/QHP: CPT | Mod: TC,PO

## 2020-02-14 RX ORDER — SODIUM CHLORIDE, SODIUM LACTATE, POTASSIUM CHLORIDE, CALCIUM CHLORIDE 600; 310; 30; 20 MG/100ML; MG/100ML; MG/100ML; MG/100ML
INJECTION, SOLUTION INTRAVENOUS CONTINUOUS
Status: DISCONTINUED | OUTPATIENT
Start: 2020-02-14 | End: 2020-02-14 | Stop reason: HOSPADM

## 2020-02-14 RX ORDER — METHYLPREDNISOLONE ACETATE 80 MG/ML
INJECTION, SUSPENSION INTRA-ARTICULAR; INTRALESIONAL; INTRAMUSCULAR; SOFT TISSUE
Status: DISCONTINUED | OUTPATIENT
Start: 2020-02-14 | End: 2020-02-14 | Stop reason: HOSPADM

## 2020-02-14 RX ORDER — SODIUM CHLORIDE 9 MG/ML
INJECTION, SOLUTION INTRAMUSCULAR; INTRAVENOUS; SUBCUTANEOUS
Status: DISCONTINUED | OUTPATIENT
Start: 2020-02-14 | End: 2020-02-14 | Stop reason: HOSPADM

## 2020-02-14 RX ORDER — METFORMIN HYDROCHLORIDE 500 MG/1
500 TABLET ORAL 2 TIMES DAILY WITH MEALS
COMMUNITY
End: 2021-11-10

## 2020-02-14 RX ORDER — MIDAZOLAM HYDROCHLORIDE 2 MG/2ML
INJECTION, SOLUTION INTRAMUSCULAR; INTRAVENOUS
Status: DISCONTINUED | OUTPATIENT
Start: 2020-02-14 | End: 2020-02-14 | Stop reason: HOSPADM

## 2020-02-14 RX ORDER — LIDOCAINE HYDROCHLORIDE 10 MG/ML
INJECTION, SOLUTION EPIDURAL; INFILTRATION; INTRACAUDAL; PERINEURAL
Status: DISCONTINUED | OUTPATIENT
Start: 2020-02-14 | End: 2020-02-14 | Stop reason: HOSPADM

## 2020-02-14 RX ADMIN — SODIUM CHLORIDE, SODIUM LACTATE, POTASSIUM CHLORIDE, AND CALCIUM CHLORIDE: .6; .31; .03; .02 INJECTION, SOLUTION INTRAVENOUS at 12:02

## 2020-02-14 NOTE — OP NOTE
PROCEDURE DATE: 2/14/2020    Procedure: C7-T1 cervical interlaminar epidural steroid injection under utilizing fluoroscopy.    Diagnosis: Cervical Radiculopathy    POSTOP DIAGNOSIS: SAME    Physician: Jeffry Seaman MD    Medications injected:  Methylprednisone 80mg followed by a slow injection of 4 mL sterile, preservative-free normal saline.    Local anesthetic used: Lidocaine 1%, 4 ml.    Sedation Medications: 4mg versed    Complications:  none    Estimated blood loss: none    Technique:  A time-out was taken to identify patient and procedure prior to starting the procedure.  With the patient laying in a prone position with the neck in a mid-flexed forward position, the area was prepped and draped in the usual sterile fashion using ChloraPrep and a fenestrated drape.  The area was determined under AP fluoroscopic guidance.  Local anesthetic was given using a 25-gauge 1.5 inch needle by raising a wheal and then infiltrating ventrally.  A 3.5 inch 20-gauge Touhy needle was introduced under fluoroscopic guidance to meet the lamina of C7.  The needle was then hinged under the lamina then advanced using loss of resistance technique.  Once the tip of the needle was in the desired position, the contrast dye Omnipaque was injected to determine placement and no uptake.  The steroid was then injected slowly followed by a slow injection of 4 mL of the sterile preservative-free normal saline.  The patient tolerated the procedure well.    The patient was monitored after the procedure and was given post-procedure and discharge instructions to follow at home. The patient was discharged in a stable condition.    Event Time In Time Out   In Facility [id number]    In Pre-Procedure 1215    Physician Available     Anesthesia Available     Pre-Op: Bedside Procedure Start     Pre-Op: Bedside Procedure Stop     Pre-Procedure Complete 1240    Out of Pre-Procedure     Anesthesia Start     Anesthesia Start Data Collection     Setup Start      Setup Complete     In Room 1302    Prep Start     Procedure Prep Complete     Procedure Start 1310    Procedure Closing     Emergence     Procedure Finish 1311    Sedation Start 1302    Scope In     Extent Reached     Scope Out     Sedation End 1314    Out of Room 1314    Cleanup Start     Cleanup Complete     Cosmetic Start     Cosmetic Stop     Pain Mgmt In Room     Pain Mgmt Out Room     In Recovery     Anesthesia Finish     Bedside Procedure Start     Bedside Procedure Stop     Recovery Care Complete     Out of Recovery     To Phase II     In Phase II     Pain Mgmt Recovery Start     Pain Mgmt Recovery Stop     Obs Rec Start     Obs Rec Stop     Phase II Care Complete     Out of Phase II     Procedural Care Complete     Discharge     Pain Follow Up Needed     Pain Follow Up Complete

## 2020-02-14 NOTE — PLAN OF CARE
Patient tolerating oral liquids without difficulty. No apparent s&s of distress noted at this time, no complaints voiced at this time. Injection site free from redness and drainage.  Discharge instructions reviewed with patient/family with good verbal feedback received. Patient ready for discharge

## 2020-02-14 NOTE — DISCHARGE SUMMARY
[facility]  Discharge Note  Short Stay    Admit Date: 2/14/2020    Discharge Date: 2/14/2020    Attending Physician: Jeffry Seaman MD     Discharge Provider: Jeffry Seaman    Diagnoses:  Active Hospital Problems    Diagnosis  POA    *Cervical radiculopathy [M54.12]  Yes      Resolved Hospital Problems   No resolved problems to display.       Discharged Condition: good    Final Diagnoses: Cervical radiculopathy [M54.12]    Disposition: Home or Self Care    Hospital Course: no complications, uneventful    Outcome of Hospitalization, Treatment, Procedure, or Surgery:  Patient was admitted for outpatient procedure. The patient underwent procedure without complications and are discharged home    Follow up/Patient Instructions:  Follow up as scheduled in Pain Management clinic in 3-4 weeks/Patient has received instructions and follow up date and time    Medications:  Continue previous medications    Discharge Procedure Orders   Call MD for:  temperature >100.4     Call MD for:  severe uncontrolled pain     Call MD for:  redness, tenderness, or signs of infection (pain, swelling, redness, odor or green/yellow discharge around incision site)     Call MD for:  severe persistent headache     No dressing needed         Discharge Procedure Orders (must include Diet, Follow-up, Activity):   Discharge Procedure Orders (must include Diet, Follow-up, Activity)   Call MD for:  temperature >100.4     Call MD for:  severe uncontrolled pain     Call MD for:  redness, tenderness, or signs of infection (pain, swelling, redness, odor or green/yellow discharge around incision site)     Call MD for:  severe persistent headache     No dressing needed

## 2020-02-14 NOTE — DISCHARGE INSTRUCTIONS
Home Care Instructions    Apply ice pack to injection site for 20 minute periods for the first 24 hours for soreness/discomfort at injection site  DO NOT USE HEAT FOR 24 HOURS  Keep site clean and dry for 24 hours. If Band-Aid present remove when desired.  Do not drive until tomorrow  Take care when walking after a lumbar injection    STEROIDS  Make take 10-14 days for full affects  Avoid strenuous exercises for 2 days      Resume home medications as prescribes today  Resume Aspirin, Plavix or Coumadin the day after the procedure unless otherwise intructed    SEE IMMEDIATE MEDICAL HELP FOR:  Severe increase in your usual pain or appearance of new pain  Prolonged or increasing weakness or numbness in the legs or arms  Drainage, redness, active bleeding, or increased swelling at the injection site  Temperature over 100.0 degrees F.  Headache that increases when your head is upright and decrease when you lie flat    CALL 911 OR GO DIRECTLY TO EMERGENCY DEPARTMENT FOR:  Shortness of breath, chest pain, or problems breathing

## 2020-03-03 ENCOUNTER — PATIENT MESSAGE (OUTPATIENT)
Dept: PAIN MEDICINE | Facility: CLINIC | Age: 60
End: 2020-03-03

## 2020-03-04 ENCOUNTER — PATIENT MESSAGE (OUTPATIENT)
Dept: PAIN MEDICINE | Facility: CLINIC | Age: 60
End: 2020-03-04

## 2020-03-05 ENCOUNTER — PATIENT MESSAGE (OUTPATIENT)
Dept: PAIN MEDICINE | Facility: CLINIC | Age: 60
End: 2020-03-05

## 2020-03-17 ENCOUNTER — PATIENT MESSAGE (OUTPATIENT)
Dept: PAIN MEDICINE | Facility: CLINIC | Age: 60
End: 2020-03-17

## 2020-03-19 ENCOUNTER — TELEPHONE (OUTPATIENT)
Dept: PAIN MEDICINE | Facility: CLINIC | Age: 60
End: 2020-03-19

## 2020-03-19 RX ORDER — PREGABALIN 75 MG/1
75 CAPSULE ORAL 3 TIMES DAILY
Qty: 90 CAPSULE | Refills: 2 | Status: SHIPPED | OUTPATIENT
Start: 2020-03-19 | End: 2020-11-19

## 2020-03-19 NOTE — TELEPHONE ENCOUNTER
Spoke with the patient, she had what sounds like about 70% relief of her neck pain after the cervical epidural steroid injection.  Her right arm pain is much better.  We may need to repeat the injection in the future but for now she is doing okay.  Continuing to take her hydrocodone, we are going to increase her Lyrica from 50 up to 75 for the next month and may continue increasing further if needed.  We will schedule her for follow-up in several months.

## 2020-04-06 ENCOUNTER — PATIENT MESSAGE (OUTPATIENT)
Dept: PAIN MEDICINE | Facility: CLINIC | Age: 60
End: 2020-04-06

## 2020-04-09 ENCOUNTER — OFFICE VISIT (OUTPATIENT)
Dept: PAIN MEDICINE | Facility: CLINIC | Age: 60
End: 2020-04-09
Payer: MEDICARE

## 2020-04-09 DIAGNOSIS — M48.02 CERVICAL STENOSIS OF SPINAL CANAL: ICD-10-CM

## 2020-04-09 DIAGNOSIS — G35 MULTIPLE SCLEROSIS: ICD-10-CM

## 2020-04-09 DIAGNOSIS — M54.12 CERVICAL RADICULOPATHY: Primary | ICD-10-CM

## 2020-04-09 PROCEDURE — 99442 PR PHYSICIAN TELEPHONE EVALUATION 11-20 MIN: ICD-10-PCS | Mod: 95,,, | Performed by: ANESTHESIOLOGY

## 2020-04-09 PROCEDURE — 99442 PR PHYSICIAN TELEPHONE EVALUATION 11-20 MIN: CPT | Mod: 95,,, | Performed by: ANESTHESIOLOGY

## 2020-04-09 NOTE — Clinical Note
As soon as things open back up, she needs to have an MRI of her cervical spine, please contact her and we will order this at that time.  She would like to do this in [place] if possible

## 2020-04-09 NOTE — PROGRESS NOTES
This note was completed with dictation software and grammatical errors may exist.    CC: Neck and bilateral arm pain, back pain, knee pain     HPI: The patient is a 60-year-old woman with a history of hypertension, diabetes, multiple sclerosis, lumbar postlaminectomy syndrome who presents in self-referral for continued low back pain and bilateral leg pain, neck pain.  Today's visit was and audio visit since video was not available.  Total time was 16 minutes.  The last time we had seen the patient she was complaining of more neck pain radiating into the arms.  She is status post cervical ROBIN on 02/14/2020 with what sounds like 90% relief but only for 1 week.  The pain returned.  She reports having pain throughout her left neck, into the left shoulder and into the arm, radiates down to the elbow she denies any pain further down into the elbow.  She denies any weakness in that arm.  She states that she has similar pain on the right side but in a different location, not as bad.  She also reports that she has been falling, she states that in the last couple months she has fallen 6 times. We had also recently increased her Lyrica from 50-75 milligrams 3 times daily, unclear if this is helping.    Pain intervention history: She has been followed by one of our previous [facility] pain management physicians, Dr. Chapa in [place] until recently.  She had undergone fusion surgery in 1983 and again surgery in 1993 for her lumbar spine.  She had undergone numerous lumbar spine injections in the past with good relief.  She has tried gabapentin with only minimal relief but states that Lyrica works best.  Sounds like she had a spinal cord stimulator trial in the past that did not help.  She has been taking hydrocodone up to 4 times a day for several years.  She is status post caudal epidural steroid injection on 8/6/14 with 60% relief.  She is status post caudal epidural steroid injection on 5/13/15 with 0% relief.  She is status  post caudal epidural steroid injection on 10/2/15 with 0% relief.  She is status post C7-T1 cervical interlaminar epidural steroid injection on 9/7/16 with 100% relief of her bilateral arm pain and mild relief of her neck pain.  She is status post C7-T1 cervical interlaminar epidural steroid injection on 2/21/17 with at least 50% relief.  She is status post bilateral L1 transforaminal epidural steroid injections on 3/20/17 with 75% relief. She is status post C7-T1 cervical interlaminar epidural steroid injection on 12/22/17 and again on 2/9/18 with 90% relief of her neck and arm pain.  She is status post bilateral L1/2 transforaminal epidural steroid injections on 08/08/2018 with almost 100% relief of her low back pain.    ROS:She reports urinary frequency, joint stiffness, joint swelling, back pain, memory loss, anxiety, depression, difficulty sleeping and loss of balance.  Balance of review of systems is negative.    Medical, surgical, family and social history reviewed elsewhere in record.    Medications/Allergies: See med card    There were no vitals filed for this visit.      Physical exam:  Audio only      Imaging:  MRI C-spine 10/10/17  Comparison MRI cervical spine 08/16/2016.  A minimal reversal of the mid cervical curvature again noted with anatomic alignment.  Degenerative changes of the discs including loss of height and desiccation also again noted at C4-5 and C5-6.  C3-4, moderate right facet arthropathy.  C4-5, bulging of the disc again noted with effacement of the adjacent ventral subarachnoid space.  C5-6, bulging of the disc again noted with superimposed small more focal right paracentral protrusion of the disc with complete effacement of ventral subarachnoid space and flattening of the ventral cord more so on the right, unchanged.  Overall mild degree of canal stenosis results.  Bilateral uncinate hypertrophy is present with moderate to severe bilateral foraminal stenosis.  C6-7, mild annular  bulging slightly asymmetric to the left posteriorly.  C7-T1, small posterior midline/right prominent midline protrusion, unchanged.  Mild annular bulging also suspected T1-2.  The cervical cord maintains normal signal throughout.  No abnormal intra-dural enhancement is seen.    MRI lumbar spine 2/1/17  There are postoperative changes of posterior instrumented fusion at L2-S1.  There is a grade I anterolisthesis of L5 on S1.  The lumbar vertebral bodies show normal height without evidence of acute compression fracture or pathologic marrow replacement process.  There is a levoconvex curvature of the lumbar spine. There is degenerative desiccation, disc space narrowing, and degenerative endplate change at T11-T12, T12-L1, L1-L2, and L5-S1.  There is near complete fusion of the L2, L3, L4, and L5 vertebral bodies.  There are probable postoperative changes of posterior decompression at L2-S1. The conus medullaris terminates at L1. The visualized retroperitoneal/abdominal soft tissue  structures are unremarkable.  T11-T12: There is a broad disc bulge which flattens the ventral aspect of the conus.  There is ligamentum flavum thickening and facet arthropathy.  There is moderate bilateral neuroforaminal stenosis present.  There is mild-moderate central canal stenosis.  T12-L1: There is a broad disc bulge with a superimposed ascending right paracentral disc extrusion which extends approximately 10 mm above the disc plane.  There is mild-moderate central canal stenosis present.  There is mild right neuroforaminal stenosis.  L1-L2: There is a broad disc bulge throughout the period there is ligamentum flavum thickening.  The central canal is obscured.  There is a suggestion of a possible right paracentral disc protrusion.  There is mild-moderate overall central canal stenosis.  The neural foramina are not assessed secondary to metal artifact.  L2-L3: No central canal or neuroforaminal stenosis. No disc protrusion or  extrusion.  L3-L4: No central canal or neuroforaminal stenosis. No disc protrusion or extrusion.  L4-L5: The left neural foramen is not optimally evaluated due to metal artifact.  No central canal stenosis or right neuroforaminal stenosis.  L5-S1: There is a grade I anterolisthesis of L5 on S1 with associated uncovering of the intervertebral disc.  No definite central canal stenosis or neuroforaminal stenosis.      Assessment:  The patient is a 60-year-old woman with a history of hypertension, diabetes, multiple sclerosis, lumbar postlaminectomy syndrome who presents in self-referral for continued low back pain and bilateral leg pain, neck pain.   1. Cervical radiculopathy     2. Cervical stenosis of spinal canal     3. Multiple sclerosis                                                                                                                                                                                                 Plan:  1.  I am concerned that she is continuing to have pain but even more so because she is having balance issues.  Is difficult to say if this is related to her neck or not since we cannot do a physical exam.  She has MS, hypertension, diabetes so she is high risk if she contracts COVID, so I think it is best if she stays home.  Once things open back up in a month or so, I would like her to get an MRI of her cervical spine and we may consider an injection versus having her see Neurosurgery.

## 2020-05-20 ENCOUNTER — PATIENT MESSAGE (OUTPATIENT)
Dept: PAIN MEDICINE | Facility: CLINIC | Age: 60
End: 2020-05-20

## 2020-05-20 DIAGNOSIS — M48.02 SPINAL STENOSIS OF CERVICAL REGION: ICD-10-CM

## 2020-05-20 DIAGNOSIS — M54.12 CERVICAL RADICULOPATHY: Primary | ICD-10-CM

## 2020-05-20 RX ORDER — HYDROCODONE BITARTRATE AND ACETAMINOPHEN 10; 325 MG/1; MG/1
1 TABLET ORAL EVERY 6 HOURS PRN
Qty: 120 TABLET | Refills: 0 | Status: SHIPPED | OUTPATIENT
Start: 2020-05-23 | End: 2020-06-23 | Stop reason: SDUPTHER

## 2020-05-21 ENCOUNTER — TELEPHONE (OUTPATIENT)
Dept: PAIN MEDICINE | Facility: CLINIC | Age: 60
End: 2020-05-21

## 2020-05-21 RX ORDER — DIAZEPAM 5 MG/1
5 TABLET ORAL
Qty: 1 TABLET | Refills: 0 | Status: SHIPPED | OUTPATIENT
Start: 2020-05-21 | End: 2021-11-10

## 2020-05-21 NOTE — TELEPHONE ENCOUNTER
----- Message from Evelyn Peña sent at 5/21/2020  4:44 PM CDT -----  Contact: PT   PT called and schedule an appointment with someone but whoever scheduled the appointment put it at [facility] when she asked for it to be at [place]. Said she will go to [facility] this time but for future appointments she would like them at [facility] [place] [facility] [place]. Please call her back.    Callback: [phone]

## 2020-05-25 ENCOUNTER — HOSPITAL ENCOUNTER (OUTPATIENT)
Dept: RADIOLOGY | Facility: HOSPITAL | Age: 60
Discharge: HOME | End: 2020-05-25
Attending: ANESTHESIOLOGY
Payer: MEDICARE

## 2020-05-25 ENCOUNTER — PATIENT MESSAGE (OUTPATIENT)
Dept: PAIN MEDICINE | Facility: CLINIC | Age: 60
End: 2020-05-25

## 2020-05-25 DIAGNOSIS — M54.12 CERVICAL RADICULOPATHY: ICD-10-CM

## 2020-05-25 DIAGNOSIS — M48.02 SPINAL STENOSIS OF CERVICAL REGION: ICD-10-CM

## 2020-05-25 PROCEDURE — 72141 MRI NECK SPINE W/O DYE: CPT | Mod: TC

## 2020-05-25 PROCEDURE — 72141 MRI NECK SPINE W/O DYE: CPT | Mod: 26,,, | Performed by: RADIOLOGY

## 2020-05-25 PROCEDURE — 72141 MRI CERVICAL SPINE WITHOUT CONTRAST: ICD-10-PCS | Mod: 26,,, | Performed by: RADIOLOGY

## 2020-05-25 NOTE — TELEPHONE ENCOUNTER
Please let the patient know that we reviewed her new cervical spine MRI and there have been no significant changes.  We can try scheduling a C7-T1 interlaminar epidural steroid injection and hopefully this will give her lasting relief.  If it does not we will refer her to Neurosurgery for further evaluation.

## 2020-05-27 ENCOUNTER — PATIENT MESSAGE (OUTPATIENT)
Dept: PAIN MEDICINE | Facility: CLINIC | Age: 60
End: 2020-05-27

## 2020-05-27 ENCOUNTER — TELEPHONE (OUTPATIENT)
Dept: PAIN MEDICINE | Facility: CLINIC | Age: 60
End: 2020-05-27

## 2020-05-27 DIAGNOSIS — M54.12 CERVICAL RADICULOPATHY: Primary | ICD-10-CM

## 2020-05-27 DIAGNOSIS — Z11.9 SCREENING EXAMINATION FOR INFECTIOUS DISEASE: ICD-10-CM

## 2020-05-27 DIAGNOSIS — Z11.9 SCREENING EXAMINATION FOR INFECTIOUS DISEASE: Primary | ICD-10-CM

## 2020-05-27 RX ORDER — SODIUM CHLORIDE, SODIUM LACTATE, POTASSIUM CHLORIDE, CALCIUM CHLORIDE 600; 310; 30; 20 MG/100ML; MG/100ML; MG/100ML; MG/100ML
INJECTION, SOLUTION INTRAVENOUS CONTINUOUS
Status: CANCELLED | OUTPATIENT
Start: 2020-06-08

## 2020-05-27 NOTE — TELEPHONE ENCOUNTER
We believe her shoulder and arm pain is because the nerves in her neck are being irritated and these nerves travel through the shoulder and into the arm.

## 2020-05-28 NOTE — TELEPHONE ENCOUNTER
Attempted to find an [facility] facility closest to patient to get the Covid test but the facility does not have the Epic system yet. Patient stated she does not have transportation to come to our facility to receive covid test two days before her procedure.

## 2020-06-02 ENCOUNTER — HISTORICAL (OUTPATIENT)
Dept: ADMINISTRATIVE | Facility: HOSPITAL | Age: 60
End: 2020-06-02

## 2020-06-11 ENCOUNTER — HOSPITAL ENCOUNTER (OUTPATIENT)
Dept: RADIOLOGY | Facility: HOSPITAL | Age: 60
Discharge: HOME | End: 2020-06-11
Attending: ANESTHESIOLOGY | Admitting: ANESTHESIOLOGY
Payer: MEDICARE

## 2020-06-11 DIAGNOSIS — M50.30 DDD (DEGENERATIVE DISC DISEASE), CERVICAL: ICD-10-CM

## 2020-08-07 DIAGNOSIS — M25.512 LEFT SHOULDER PAIN, UNSPECIFIED CHRONICITY: Primary | ICD-10-CM

## 2020-08-12 ENCOUNTER — OFFICE VISIT (OUTPATIENT)
Dept: ORTHOPEDICS | Facility: CLINIC | Age: 60
End: 2020-08-12
Payer: MEDICARE

## 2020-08-12 ENCOUNTER — HOSPITAL ENCOUNTER (OUTPATIENT)
Dept: RADIOLOGY | Facility: HOSPITAL | Age: 60
Discharge: HOME | End: 2020-08-12
Attending: ORTHOPAEDIC SURGERY
Payer: MEDICARE

## 2020-08-12 VITALS — BODY MASS INDEX: 34.23 KG/M2 | RESPIRATION RATE: 16 BRPM | WEIGHT: 213 LBS | HEIGHT: 66 IN

## 2020-08-12 DIAGNOSIS — M25.512 LEFT SHOULDER PAIN, UNSPECIFIED CHRONICITY: Primary | ICD-10-CM

## 2020-08-12 DIAGNOSIS — M19.012 PRIMARY OSTEOARTHRITIS OF LEFT SHOULDER: Primary | ICD-10-CM

## 2020-08-12 DIAGNOSIS — M25.512 LEFT SHOULDER PAIN, UNSPECIFIED CHRONICITY: ICD-10-CM

## 2020-08-12 PROCEDURE — 99999 PR PBB SHADOW E&M-EST. PATIENT-LVL IV: ICD-10-PCS | Mod: PBBFAC,,, | Performed by: ORTHOPAEDIC SURGERY

## 2020-08-12 PROCEDURE — 99203 PR OFFICE/OUTPT VISIT, NEW, LEVL III, 30-44 MIN: ICD-10-PCS | Mod: 25,S$GLB,, | Performed by: ORTHOPAEDIC SURGERY

## 2020-08-12 PROCEDURE — 73030 X-RAY EXAM OF SHOULDER: CPT | Mod: 26,50,, | Performed by: RADIOLOGY

## 2020-08-12 PROCEDURE — 99203 OFFICE O/P NEW LOW 30 MIN: CPT | Mod: 25,S$GLB,, | Performed by: ORTHOPAEDIC SURGERY

## 2020-08-12 PROCEDURE — 73030 X-RAY EXAM OF SHOULDER: CPT | Mod: TC,50,PO

## 2020-08-12 PROCEDURE — 3008F BODY MASS INDEX DOCD: CPT | Mod: CPTII,S$GLB,, | Performed by: ORTHOPAEDIC SURGERY

## 2020-08-12 PROCEDURE — 20610 LARGE JOINT ASPIRATION/INJECTION: L SUBACROMIAL BURSA: ICD-10-PCS | Mod: LT,S$GLB,, | Performed by: ORTHOPAEDIC SURGERY

## 2020-08-12 PROCEDURE — 99999 PR PBB SHADOW E&M-EST. PATIENT-LVL IV: CPT | Mod: PBBFAC,,, | Performed by: ORTHOPAEDIC SURGERY

## 2020-08-12 PROCEDURE — 73030 XR SHOULDER TRAUMA 3 VIEW BILATERAL: ICD-10-PCS | Mod: 26,50,, | Performed by: RADIOLOGY

## 2020-08-12 PROCEDURE — 3008F PR BODY MASS INDEX (BMI) DOCUMENTED: ICD-10-PCS | Mod: CPTII,S$GLB,, | Performed by: ORTHOPAEDIC SURGERY

## 2020-08-12 PROCEDURE — 20610 DRAIN/INJ JOINT/BURSA W/O US: CPT | Mod: LT,S$GLB,, | Performed by: ORTHOPAEDIC SURGERY

## 2020-08-12 RX ORDER — TRIAMCINOLONE ACETONIDE 40 MG/ML
40 INJECTION, SUSPENSION INTRA-ARTICULAR; INTRAMUSCULAR
Status: DISCONTINUED | OUTPATIENT
Start: 2020-08-12 | End: 2020-08-12 | Stop reason: HOSPADM

## 2020-08-12 RX ADMIN — TRIAMCINOLONE ACETONIDE 40 MG: 40 INJECTION, SUSPENSION INTRA-ARTICULAR; INTRAMUSCULAR at 09:08

## 2020-08-12 NOTE — PROCEDURES
Large Joint Aspiration/Injection: L subacromial bursa    Date/Time: 8/12/2020 9:30 AM  Performed by: Jayy Kay MD  Authorized by: Jayy Kay MD     Consent Done?:  Yes (Verbal)  Indications:  Pain  Site marked: the procedure site was marked    Timeout: prior to procedure the correct patient, procedure, and site was verified    Local anesthetic:  Lidocaine 1% without epinephrine and bupivacaine 0.25% without epinephrine  Anesthetic total (ml):  6      Details:  Needle Size:  20 G  Ultrasonic Guidance for needle placement?: No    Approach:  Posterior  Location:  Shoulder  Site:  L subacromial bursa  Medications:  40 mg triamcinolone acetonide 40 mg/mL  Patient tolerance:  Patient tolerated the procedure well with no immediate complications

## 2020-08-12 NOTE — LETTER
August 12, 2020      Jeffry Seaman MD  [address] [facility] [address] [place] [address]           [facility] [place]  [address] [place] [address]  Phone: [phone]          Patient: Justin Barnes   MR Number: [id number]   YOB: 1960   Date of Visit: 8/12/2020       Dear Dr. Jeffry Seaman:    Thank you for referring Justin Barnes to me for evaluation. Attached you will find relevant portions of my assessment and plan of care.    If you have questions, please do not hesitate to call me. I look forward to following Justin Barnes along with you.    Sincerely,    Jayy Kay MD    Enclosure  CC:  No Recipients    If you would like to receive this communication electronically, please contact [email or web address] or [phone] to request more information on [employer] Link access.    For providers and/or their staff who would like to refer a patient to [facility], please contact us through our one-stop-shop provider referral line, [facility], at [phone].    If you feel you have received this communication in error or would no longer like to receive these types of communications, please e-mail [email or web address]

## 2020-08-12 NOTE — PROGRESS NOTES
Past Medical History:   Diagnosis Date    Anxiety 3/17/2014    Cervical cancer 1996    Combined hyperlipidemia associated with type 2 diabetes mellitus     Coronary artery disease, non-occlusive     DDD (degenerative disc disease), cervical 2/24/2014    DDD (degenerative disc disease), lumbar 2/24/2014    Diabetes mellitus, type 2     Encounter for blood transfusion     Hand arthritis     bilateral    Hypertension associated with diabetes     Lumbar postlaminectomy syndrome 2/24/2014    Multiple sclerosis 2/24/2014       Past Surgical History:   Procedure Laterality Date    BACK SURGERY      x2, both lumbar    CARDIAC CATHETERIZATION      no CAD    CARPAL TUNNEL RELEASE Left     COLONOSCOPY      COLONOSCOPY N/A 7/26/2016    Procedure: COLONOSCOPY;  Surgeon: Jefe Sow MD;  Location: [facility] [place] END[facility];  Service: Endoscopy;  Laterality: N/A;    CYST REMOVAL      skin of back    Epidural Steroid injection      EPIDURAL STEROID INJECTION INTO CERVICAL SPINE N/A 2/14/2020    Procedure: Injection-steroid-epidural-cervical;  Surgeon: Jeffry Seaman MD;  Location: [facility];  Service: Pain Management;  Laterality: N/A;    HYSTERECTOMY      with one ovary removed    JOINT REPLACEMENT Left 2019    left knee    KNEE SURGERY Left     3 surgeries after tka-I&D, infection, hinged knee surgery    OOPHORECTOMY      2nd ovary removed 1 year after Hysterectomy    OTHER SURGICAL HISTORY      cervical epidural injection    TUBAL LIGATION         Current Outpatient Medications   Medication Sig    AMLODIPINE BESYLATE, BULK, MISC 10 mg by Misc.(Non-Drug; Combo Route) route once daily.    BENAZEPRIL HCL, BULK, MISC 20 mg by Misc.(Non-Drug; Combo Route) route once daily.    blood sugar diagnostic (BLOOD GLUCOSE TEST) Strp Test glucose 1 x daily    hydrALAZINE (APRESOLINE) 25 MG tablet Take 25 mg by mouth 3 (three) times daily.    hydroCHLOROthiazide (HYDRODIURIL) 25 MG tablet Take 25 mg by mouth once daily.     HYDROcodone-acetaminophen (NORCO)  mg per tablet Take 1 tablet by mouth every 6 (six) hours as needed.    lancets Misc As directed    metFORMIN (GLUCOPHAGE) 500 MG tablet Take 500 mg by mouth 2 (two) times daily with meals.    metoprolol tartrate (LOPRESSOR) 50 MG tablet Take 50 mg by mouth 2 (two) times daily.    pioglitazone (ACTOS) 15 MG tablet Take 15 mg by mouth once daily.    pregabalin (LYRICA) 75 MG capsule Take 1 capsule (75 mg total) by mouth 3 (three) times daily.    SITagliptin (JANUVIA) 50 MG Tab Take 50 mg by mouth once daily.    venlafaxine 225 mg TR24 Take 1 tablet by mouth once daily.    vitamin D 1000 units Tab Take 10,000 mg by mouth once daily.     atorvastatin (LIPITOR) 80 MG tablet Take 1 tablet (80 mg total) by mouth once daily.    blood-glucose meter Misc Use as directed    diazePAM (VALIUM) 5 MG tablet Take 1 tablet (5 mg total) by mouth On call Procedure for Anxiety.    diclofenac sodium 1 % Gel Apply 2 g topically 4 (four) times daily.    duloxetine (CYMBALTA) 60 MG capsule Take 1 capsule (60 mg total) by mouth once daily.    levocetirizine (XYZAL) 5 MG tablet Take 1 tablet (5 mg total) by mouth every evening.    nitroGLYCERIN (NITROSTAT) 0.4 MG SL tablet Place 1 tablet (0.4 mg total) under the tongue every 5 (five) minutes as needed for Chest pain.     No current facility-administered medications for this visit.        Review of patient's allergies indicates:   Allergen Reactions    Amoxil [amoxicillin] Itching and Rash    Latex, natural rubber Hives and Swelling    Penicillins        Family History   Problem Relation Age of Onset    Breast cancer Neg Hx     Ovarian cancer Neg Hx        Social History     Socioeconomic History    Marital status:      Spouse name: Not on file    Number of children: Not on file    Years of education: Not on file    Highest education level: Not on file   Occupational History    Not on file   Social Needs     Financial resource strain: Not on file    Food insecurity     Worry: Not on file     Inability: Not on file    Transportation needs     Medical: Not on file     Non-medical: Not on file   Tobacco Use    Smoking status: Current Every Day Smoker     Packs/day: 0.50     Types: Cigarettes     Start date: 5/7/1973    Smokeless tobacco: Never Used   Substance and Sexual Activity    Alcohol use: No    Drug use: Yes     Types: Hydrocodone    Sexual activity: Never     Partners: Male   Lifestyle    Physical activity     Days per week: Not on file     Minutes per session: Not on file    Stress: Not on file   Relationships    Social connections     Talks on phone: Not on file     Gets together: Not on file     Attends Christian service: Not on file     Active member of club or organization: Not on file     Attends meetings of clubs or organizations: Not on file     Relationship status: Not on file   Other Topics Concern    Not on file   Social History Narrative    Was not raised by family, does not know history       Chief Complaint:   Chief Complaint   Patient presents with    Left Shoulder - Pain       History of present illness:  This is a 60-year-old female with bilateral shoulder pain.  Patient has history of bilateral shoulder arthritis.  No real treatment specifically for her shoulders.  Patient sees Dr. Seaman for chronic neck and back problems.  Patient does have a history of left knee joint infection with need a revision total joint arthroplasty.  Patient saw Dr. Ewing recently who did both a CT scan and an MRI of her left shoulder and recommended a reverse total shoulder arthroplasty, but he recommended that she quit smoking before he would do it.  Patient states that she is trying to put cannot quit.  Pain is a 10/10.  No history of injections in the shoulder itself.    Answers for HPI/ROS submitted by the patient on 8/10/2020   Arm pain  unexpected weight change: No  appetite change : No  sleep  disturbance: Yes  IMMUNOCOMPROMISED: No  nervous/ anxious: No  dysphoric mood: Yes  rash: No  visual disturbance: No  eye redness: No  eye pain: No  ear pain: No  tinnitus: No  hearing loss: No  sinus pressure : No  nosebleeds: No  enviro allergies: No  food allergies: No  cough: No  shortness of breath: No  sweating: Yes  dysuria: No  frequency: No  difficulty urinating: No  hematuria: No  painful intercourse: No  chest pain: No  palpitations: No  nausea: No  vomiting: No  diarrhea: No  blood in stool: No  constipation: No  headaches: No  dizziness: No  numbness: No  seizures: No  joint swelling: No  myalgia: Yes  weakness: No  back pain: Yes  Pain Chronicity: chronic  History of trauma: No  Onset: more than 1 month ago  Frequency: constantly  Progression since onset: rapidly worsening  Injury mechanism: lifting  injury location: at home  pain- numeric: 9/10  pain location: left shoulder, right shoulder  pain quality: aching, sharp, throbbing  Radiating Pain: No  Aggravating factors: activity, bearing weight, eating, contact, extension  fever: No  inability to bear weight: Yes  itching: No  joint locking: Yes  limited range of motion: Yes  stiffness: No  tingling: No  Treatments tried: injection treatment, oral narcotics  physical therapy: not tried  Improvement on treatment: mild      Physical Examination:    Vital Signs:    Vitals:    08/12/20 0911   Resp: 16       Body mass index is 34.91 kg/m².    This a well-developed, well nourished patient in no acute distress.  They are alert and oriented and cooperative to examination.  Pt. walks without an antalgic gait.      Examination of both shoulders shows at passive forward flexion about 90° with external rotation of 40° and internal rotation of 30°.  Severe impingement with crepitus.  Neurovascularly intact.    X-rays:  X-rays of both shoulders are ordered and reviewed which show severe glenohumeral arthritis with multiple loose bodies    MRI of the left shoulder  from [facility]:  Severe degenerative arthritic changes are present with bone on bone and extensive secondary osteochondromatosis.  Extensive rotator cuff tearing and rotator cuff atrophy.  Large effusion with extensive subchondral cystic change.    CT scan of the left shoulder:  Very advanced degenerative changes of the left shoulder.     Assessment::  Severe left shoulder arthritis with rotator cuff tearing and atrophy    Plan:  I reviewed the findings with her today.  I agree that a reverse total shoulder arthroplasty is which she needs.  I too agree that she needs to quit smoking or using any type of nicotine products before elective joint replacement.  She has significant increased risk of joint infection because of her previous history of joint infection and the smoking.  She gets very upset and tearful at the recommendation that she quit smoking.  She states that she has tried and cannot quit.  She is currently in the smoking cessation program here at [facility].  I recommended checking some blood work such as a CRP and sed rate to make sure that there is no underlying hands of infections still in her system.  I recommended a cortisone injection today to hopefully help her pain while she tries to quit smoking.    This note was created using [employer] voice recognition software that occasionally misinterpreted phrases or words.    Consult note is delivered via Epic messaging service.

## 2020-09-17 ENCOUNTER — PATIENT MESSAGE (OUTPATIENT)
Dept: PAIN MEDICINE | Facility: CLINIC | Age: 60
End: 2020-09-17

## 2020-09-18 ENCOUNTER — PATIENT MESSAGE (OUTPATIENT)
Dept: PAIN MEDICINE | Facility: CLINIC | Age: 60
End: 2020-09-18

## 2020-09-22 ENCOUNTER — PATIENT MESSAGE (OUTPATIENT)
Dept: PAIN MEDICINE | Facility: CLINIC | Age: 60
End: 2020-09-22

## 2020-09-22 RX ORDER — HYDROCODONE BITARTRATE AND ACETAMINOPHEN 10; 325 MG/1; MG/1
1 TABLET ORAL EVERY 6 HOURS PRN
Qty: 120 TABLET | Refills: 0 | Status: SHIPPED | OUTPATIENT
Start: 2020-09-22 | End: 2020-10-20 | Stop reason: SDUPTHER

## 2020-09-24 ENCOUNTER — PATIENT MESSAGE (OUTPATIENT)
Dept: PAIN MEDICINE | Facility: CLINIC | Age: 60
End: 2020-09-24

## 2020-09-26 NOTE — TELEPHONE ENCOUNTER
Please schedule patient for a C7-T1 interlaminar epidural steroid injection with 4 week follow-up.  If this does not help she will need to see Neurosurgery.

## 2020-09-28 ENCOUNTER — PATIENT MESSAGE (OUTPATIENT)
Dept: PAIN MEDICINE | Facility: CLINIC | Age: 60
End: 2020-09-28

## 2020-10-01 ENCOUNTER — PATIENT MESSAGE (OUTPATIENT)
Dept: PAIN MEDICINE | Facility: CLINIC | Age: 60
End: 2020-10-01

## 2020-10-01 DIAGNOSIS — M54.12 CERVICAL RADICULOPATHY: Primary | ICD-10-CM

## 2020-10-01 RX ORDER — SODIUM CHLORIDE, SODIUM LACTATE, POTASSIUM CHLORIDE, CALCIUM CHLORIDE 600; 310; 30; 20 MG/100ML; MG/100ML; MG/100ML; MG/100ML
INJECTION, SOLUTION INTRAVENOUS CONTINUOUS
Status: CANCELLED | OUTPATIENT
Start: 2020-10-15

## 2020-10-05 ENCOUNTER — PATIENT MESSAGE (OUTPATIENT)
Dept: RESEARCH | Facility: OTHER | Age: 60
End: 2020-10-05

## 2020-10-12 ENCOUNTER — CLINICAL SUPPORT (OUTPATIENT)
Dept: FAMILY MEDICINE | Facility: CLINIC | Age: 60
End: 2020-10-12
Payer: MEDICARE

## 2020-10-12 DIAGNOSIS — Z11.9 SCREENING EXAMINATION FOR INFECTIOUS DISEASE: ICD-10-CM

## 2020-10-12 PROCEDURE — U0003 INFECTIOUS AGENT DETECTION BY NUCLEIC ACID (DNA OR RNA); SEVERE ACUTE RESPIRATORY SYNDROME CORONAVIRUS 2 (SARS-COV-2) (CORONAVIRUS DISEASE [COVID-19]), AMPLIFIED PROBE TECHNIQUE, MAKING USE OF HIGH THROUGHPUT TECHNOLOGIES AS DESCRIBED BY CMS-2020-01-R: HCPCS

## 2020-10-13 LAB — SARS-COV-2 RNA RESP QL NAA+PROBE: NOT DETECTED

## 2020-10-15 ENCOUNTER — HOSPITAL ENCOUNTER (OUTPATIENT)
Facility: HOSPITAL | Age: 60
Discharge: HOME | End: 2020-10-15
Attending: ANESTHESIOLOGY | Admitting: ANESTHESIOLOGY
Payer: MEDICARE

## 2020-10-15 ENCOUNTER — HOSPITAL ENCOUNTER (OUTPATIENT)
Dept: RADIOLOGY | Facility: HOSPITAL | Age: 60
Discharge: HOME | End: 2020-10-15
Attending: ANESTHESIOLOGY | Admitting: ANESTHESIOLOGY
Payer: MEDICARE

## 2020-10-15 DIAGNOSIS — M54.12 CERVICAL RADICULOPATHY: Primary | ICD-10-CM

## 2020-10-15 DIAGNOSIS — M50.30 DDD (DEGENERATIVE DISC DISEASE), CERVICAL: ICD-10-CM

## 2020-10-15 LAB — GLUCOSE SERPL-MCNC: 99 MG/DL (ref 70–110)

## 2020-10-15 PROCEDURE — 62321 NJX INTERLAMINAR CRV/THRC: CPT | Mod: ,,, | Performed by: ANESTHESIOLOGY

## 2020-10-15 PROCEDURE — 62321 NJX INTERLAMINAR CRV/THRC: CPT | Mod: PO | Performed by: ANESTHESIOLOGY

## 2020-10-15 PROCEDURE — 82962 GLUCOSE BLOOD TEST: CPT | Mod: PO | Performed by: ANESTHESIOLOGY

## 2020-10-15 PROCEDURE — 25000003 PHARM REV CODE 250: Mod: PO | Performed by: ANESTHESIOLOGY

## 2020-10-15 PROCEDURE — 63600175 PHARM REV CODE 636 W HCPCS: Mod: PO | Performed by: ANESTHESIOLOGY

## 2020-10-15 PROCEDURE — 76000 FLUOROSCOPY <1 HR PHYS/QHP: CPT | Mod: TC,PO

## 2020-10-15 PROCEDURE — 99152 MOD SED SAME PHYS/QHP 5/>YRS: CPT | Mod: ,,, | Performed by: ANESTHESIOLOGY

## 2020-10-15 PROCEDURE — 99152 PR MOD CONSCIOUS SEDATION, SAME PHYS, 5+ YRS, FIRST 15 MIN: ICD-10-PCS | Mod: ,,, | Performed by: ANESTHESIOLOGY

## 2020-10-15 PROCEDURE — 25500020 PHARM REV CODE 255: Mod: PO | Performed by: ANESTHESIOLOGY

## 2020-10-15 PROCEDURE — 62321 PR INJ CERV/THORAC, W/GUIDANCE: ICD-10-PCS | Mod: ,,, | Performed by: ANESTHESIOLOGY

## 2020-10-15 RX ORDER — METHYLPREDNISOLONE ACETATE 80 MG/ML
INJECTION, SUSPENSION INTRA-ARTICULAR; INTRALESIONAL; INTRAMUSCULAR; SOFT TISSUE
Status: DISCONTINUED | OUTPATIENT
Start: 2020-10-15 | End: 2020-10-15 | Stop reason: HOSPADM

## 2020-10-15 RX ORDER — MIDAZOLAM HYDROCHLORIDE 2 MG/2ML
INJECTION, SOLUTION INTRAMUSCULAR; INTRAVENOUS
Status: DISCONTINUED | OUTPATIENT
Start: 2020-10-15 | End: 2020-10-15 | Stop reason: HOSPADM

## 2020-10-15 RX ORDER — SODIUM CHLORIDE, SODIUM LACTATE, POTASSIUM CHLORIDE, CALCIUM CHLORIDE 600; 310; 30; 20 MG/100ML; MG/100ML; MG/100ML; MG/100ML
INJECTION, SOLUTION INTRAVENOUS CONTINUOUS
Status: DISCONTINUED | OUTPATIENT
Start: 2020-10-15 | End: 2020-10-15 | Stop reason: HOSPADM

## 2020-10-15 RX ORDER — LIDOCAINE HYDROCHLORIDE 10 MG/ML
INJECTION, SOLUTION EPIDURAL; INFILTRATION; INTRACAUDAL; PERINEURAL
Status: DISCONTINUED | OUTPATIENT
Start: 2020-10-15 | End: 2020-10-15 | Stop reason: HOSPADM

## 2020-10-15 RX ADMIN — SODIUM CHLORIDE, SODIUM LACTATE, POTASSIUM CHLORIDE, AND CALCIUM CHLORIDE: .6; .31; .03; .02 INJECTION, SOLUTION INTRAVENOUS at 01:10

## 2020-10-15 NOTE — DISCHARGE INSTRUCTIONS
PAIN MANAGEMENT    Home care instructions   Apply ice pack to the injection site for 20 minute prior for the first 24 hours for soreness/discomfort at injection site   DO NOT USE HEAT FOR 24 HOURS   Keep site clean and dry for 24 hours, remove bandaid when desired   Do not drive until tomorrow  Take care when walking after a cervical injection     STEROIDS OR RADIOFREQUENCY    May take 10-14 days for full effects  Avoid strenuous exercises for 2 days            Resume Aspirin, Plavix, or Coumadin the day after the procedure unless other wise instructed  Resume home medication as prescribed today      CALL PHYSICIAN FOR:   Severe increase in your usual pain or appearance of new pain   Prolonged or increasing weakness or numbness in the legs or arms   Fever greater then 100 degrees F..   Drainage from the incision site, redness, active bleeding or increased swelling at the injection site   Headache that increases when your head is upright and decreases when you lie flat    FOR EMERGENCIES:   Go directly to Emergency Department for Shortness of breath, chest pain, or problems breathing

## 2020-10-15 NOTE — H&P
CC: Neck pain    HPI: The patient is a 59yo woman with a history of cervical radiculopathy here for cervical ROBIN. There are no major changes in history and physical from 4/9/20.    Past Medical History:   Diagnosis Date    Anxiety 3/17/2014    Cervical cancer 1996    Combined hyperlipidemia associated with type 2 diabetes mellitus     Coronary artery disease, non-occlusive     DDD (degenerative disc disease), cervical 2/24/2014    DDD (degenerative disc disease), lumbar 2/24/2014    Diabetes mellitus, type 2     Encounter for blood transfusion     Hand arthritis     bilateral    Hypertension associated with diabetes     Lumbar postlaminectomy syndrome 2/24/2014    Multiple sclerosis 2/24/2014       Past Surgical History:   Procedure Laterality Date    BACK SURGERY      x2, both lumbar    CARDIAC CATHETERIZATION      no CAD    CARPAL TUNNEL RELEASE Left     COLONOSCOPY      COLONOSCOPY N/A 7/26/2016    Procedure: COLONOSCOPY;  Surgeon: Jefe Sow MD;  [facility] ENDO;  Service: Endoscopy;  Laterality: N/A;    CYST REMOVAL      skin of back    Epidural Steroid injection      EPIDURAL STEROID INJECTION INTO CERVICAL SPINE N/A 2/14/2020    Procedure: Injection-steroid-epidural-cervical;  Surgeon: Jeffry Seaman MD;  Location: [facility] OR;  Service: Pain Management;  Laterality: N/A;    HYSTERECTOMY      with one ovary removed    JOINT REPLACEMENT Left 2019    left knee    KNEE SURGERY Left     3 surgeries after tka-I&D, infection, hinged knee surgery    OOPHORECTOMY      2nd ovary removed 1 year after Hysterectomy    OTHER SURGICAL HISTORY      cervical epidural injection    TUBAL LIGATION         Family History   Problem Relation Age of Onset    Breast cancer Neg Hx     Ovarian cancer Neg Hx        Social History     Socioeconomic History    Marital status:      Spouse name: Not on file    Number of children: Not on file    Years of education: Not on file    Highest  education level: Not on file   Occupational History    Not on file   Social Needs    Financial resource strain: Not on file    Food insecurity     Worry: Not on file     Inability: Not on file    Transportation needs     Medical: Not on file     Non-medical: Not on file   Tobacco Use    Smoking status: Current Every Day Smoker     Packs/day: 0.50     Types: Cigarettes     Start date: 5/7/1973    Smokeless tobacco: Never Used   Substance and Sexual Activity    Alcohol use: No    Drug use: Yes     Types: Hydrocodone    Sexual activity: Never     Partners: Male   Lifestyle    Physical activity     Days per week: Not on file     Minutes per session: Not on file    Stress: Not on file   Relationships    Social connections     Talks on phone: Not on file     Gets together: Not on file     Attends Protestant service: Not on file     Active member of club or organization: Not on file     Attends meetings of clubs or organizations: Not on file     Relationship status: Not on file   Other Topics Concern    Not on file   Social History Narrative    Was not raised by family, does not know history       No current facility-administered medications for this encounter.        Review of patient's allergies indicates:   Allergen Reactions    Amoxil [amoxicillin] Itching and Rash    Latex, natural rubber Hives and Swelling    Penicillins        Vitals:    10/15/20 1320   BP: 139/72   Pulse: 72   Resp: 18   Temp: 98.4 °F (36.9 °C)   TempSrc: Skin   SpO2: (!) 94%       ASA 3, mallampati 2      REVIEW OF SYSTEMS:     GENERAL: No weight loss, malaise or fevers.  HEENT:  No recent changes in vision or hearing  NECK: Negative for lumps, no difficulty with swallowing.  RESPIRATORY: Negative for cough, wheezing or shortness of breath, patient denies any recent URI.  CARDIOVASCULAR: Negative for chest pain, leg swelling or palpitations.  GI: Negative for abdominal discomfort, blood in stools or black stools or change in bowel  habits.  MUSCULOSKELETAL: See HPI.  SKIN: Negative for lesions, rash, and itching.  PSYCH: No suicidal or homicidal ideations, no current mood disturbances.  HEMATOLOGY/LYMPHOLOGY: Negative for prolonged bleeding, bruising easily or swollen nodes. Patient is not currently taking any anti-coagulants  ENDO: No history of diabetes or thyroid dysfunction  NEURO: No history of syncope, paralysis, seizures or tremors.All other reviewed and negative other than HPI.    Physical exam:  Gen: A and O x3, pleasant, well-groomed  Skin: No rashes or obvious lesions  HEENT: PERRLA, no obvious deformities on ears or in canals. No thyroid masses, trachea midline, no palpable lymph nodes in neck, axilla.  CVS: Regular rate and rhythm, normal S1 and S2, no murmurs.  Resp: Clear to auscultation bilaterally.  Abdomen: Soft, NT/ND, normal bowel sounds present.  Musculoskeletal/Neuro: Moving all extremities    Assessment:  Cervical radiculopathy  -     Case Request Operating Room: Injection-steroid-epidural-cervical, C7-T1  -     Place in Outpatient; Standing  -     Diet NPO; Standing  -     lactated ringers infusion  -     Notify physician ; Standing  -     Notify physician ; Standing  -     Notify physician (specify); Standing  -     Place 18-22 gaua peripheral IV ; Standing  -     Verify informed consent; Standing  -     Vital signs; Standing    Other orders  -     Progressive Mobility Protocol (mobilize patient to their highest level of functioning at least twice daily); Standing  -     IP VTE LOW RISK PATIENT; Standing  -     POCT Glucose, Hand-Held Device; Standing

## 2020-10-15 NOTE — OP NOTE
PROCEDURE DATE: 10/15/2020    Procedure: C7-T1 cervical interlaminar epidural steroid injection under utilizing fluoroscopy.    Diagnosis: Cervical Radiculopathy    POSTOP DIAGNOSIS: SAME    Physician: Jeffry Seaman MD    Medications injected:  Methylprednisone 80mg followed by a slow injection of 4 mL sterile, preservative-free normal saline.    Local anesthetic used: Lidocaine 1%, 4 ml.    Sedation Medications: 2mg versed    Complications:  none    Estimated blood loss: none    Technique:  A time-out was taken to identify patient and procedure prior to starting the procedure.  With the patient laying in a prone position with the neck in a mid-flexed forward position, the area was prepped and draped in the usual sterile fashion using ChloraPrep and a fenestrated drape.  The area was determined under AP fluoroscopic guidance.  Local anesthetic was given using a 25-gauge 1.5 inch needle by raising a wheal and then infiltrating ventrally.  A 3.5 inch 20-gauge Touhy needle was introduced under fluoroscopic guidance to meet the lamina of C7.  The needle was then hinged under the lamina then advanced using loss of resistance technique.  Once the tip of the needle was in the desired position, the contrast dye Omnipaque was injected to determine placement and no uptake.  The steroid was then injected slowly followed by a slow injection of 4 mL of the sterile preservative-free normal saline.  The patient tolerated the procedure well.    The patient was monitored after the procedure and was given post-procedure and discharge instructions to follow at home. The patient was discharged in a stable condition.    Event Time In   In Facility 1254   In Pre-Procedure 1307   Physician Available    Anesthesia Available    Pre-Op: Bedside Procedure Start    Pre-Op: Bedside Procedure Stop    Pre-Procedure Complete 1319   Out of Pre-Procedure    Anesthesia Start    Anesthesia Start Data Collection    Setup Start    Setup Complete     In Room 1348   Prep Start    Procedure Prep Complete    Procedure Start 1355   Procedure Closing    Emergence    Procedure Finish 1357   Sedation Start 1347   Scope In    Extent Reached    Scope Out    Sedation End 1400   Out of Room 1400   Cleanup Start    Cleanup Complete    Cosmetic Start    Cosmetic Stop    Pain Mgmt In Room    Pain Mgmt Out Room    In Recovery    Anesthesia Finish    Bedside Procedure Start    Bedside Procedure Stop    Recovery Care Complete    Out of Recovery    To Phase II    In Phase II    Pain Mgmt Recovery Start    Pain Mgmt Recovery Stop    Obs Rec Start    Obs Rec Stop    Phase II Care Complete    Out of Phase II    Procedural Care Complete    Discharge    Pain Follow Up Needed    Pain Follow Up Complete      Moderate sedation was achieved with midazolam 2mg.  Continuous monitoring of EKG, blood pressure and pulse oximetry was provided by a registered nurse during the entire course of the procedure under my supervision and recorded in the patient's medical record.   Total time for sedation was 13 minutes.

## 2020-10-16 VITALS
RESPIRATION RATE: 16 BRPM | SYSTOLIC BLOOD PRESSURE: 138 MMHG | TEMPERATURE: 98 F | HEART RATE: 80 BPM | DIASTOLIC BLOOD PRESSURE: 68 MMHG | OXYGEN SATURATION: 98 %

## 2020-10-19 ENCOUNTER — PATIENT MESSAGE (OUTPATIENT)
Dept: PAIN MEDICINE | Facility: CLINIC | Age: 60
End: 2020-10-19

## 2020-10-20 ENCOUNTER — PATIENT MESSAGE (OUTPATIENT)
Dept: PAIN MEDICINE | Facility: CLINIC | Age: 60
End: 2020-10-20

## 2020-10-20 RX ORDER — HYDROCODONE BITARTRATE AND ACETAMINOPHEN 10; 325 MG/1; MG/1
1 TABLET ORAL EVERY 6 HOURS PRN
Qty: 120 TABLET | Refills: 0 | Status: SHIPPED | OUTPATIENT
Start: 2020-10-22 | End: 2020-11-19 | Stop reason: SDUPTHER

## 2020-11-05 ENCOUNTER — PATIENT MESSAGE (OUTPATIENT)
Dept: PAIN MEDICINE | Facility: CLINIC | Age: 60
End: 2020-11-05

## 2020-11-12 ENCOUNTER — PATIENT MESSAGE (OUTPATIENT)
Dept: PAIN MEDICINE | Facility: CLINIC | Age: 60
End: 2020-11-12

## 2020-11-19 ENCOUNTER — PATIENT MESSAGE (OUTPATIENT)
Dept: ORTHOPEDICS | Facility: CLINIC | Age: 60
End: 2020-11-19

## 2020-11-19 ENCOUNTER — OFFICE VISIT (OUTPATIENT)
Dept: PAIN MEDICINE | Facility: CLINIC | Age: 60
End: 2020-11-19
Payer: MEDICARE

## 2020-11-19 DIAGNOSIS — M51.36 DDD (DEGENERATIVE DISC DISEASE), LUMBAR: Primary | ICD-10-CM

## 2020-11-19 DIAGNOSIS — Z79.891 OPIOID CONTRACT EXISTS: ICD-10-CM

## 2020-11-19 DIAGNOSIS — M48.02 CERVICAL STENOSIS OF SPINAL CANAL: ICD-10-CM

## 2020-11-19 DIAGNOSIS — M96.1 POSTLAMINECTOMY SYNDROME OF LUMBAR REGION: ICD-10-CM

## 2020-11-19 PROCEDURE — 99213 OFFICE O/P EST LOW 20 MIN: CPT | Mod: 95,,, | Performed by: PHYSICIAN ASSISTANT

## 2020-11-19 PROCEDURE — 99213 PR OFFICE/OUTPT VISIT, EST, LEVL III, 20-29 MIN: ICD-10-PCS | Mod: 95,,, | Performed by: PHYSICIAN ASSISTANT

## 2020-11-19 RX ORDER — PREGABALIN 50 MG/1
50 CAPSULE ORAL 3 TIMES DAILY
Qty: 90 CAPSULE | Refills: 2 | Status: SHIPPED | OUTPATIENT
Start: 2020-11-19 | End: 2021-03-04

## 2020-11-19 RX ORDER — ALPRAZOLAM 1 MG/1
1 TABLET ORAL
Qty: 1 TABLET | Refills: 0 | Status: SHIPPED | OUTPATIENT
Start: 2020-11-19 | End: 2021-08-02 | Stop reason: CLARIF

## 2020-11-19 RX ORDER — HYDROCODONE BITARTRATE AND ACETAMINOPHEN 10; 325 MG/1; MG/1
1 TABLET ORAL EVERY 6 HOURS PRN
Qty: 120 TABLET | Refills: 0 | Status: SHIPPED | OUTPATIENT
Start: 2020-11-21 | End: 2020-12-21

## 2020-11-19 RX ORDER — HYDROCODONE BITARTRATE AND ACETAMINOPHEN 10; 325 MG/1; MG/1
1 TABLET ORAL EVERY 6 HOURS PRN
Qty: 120 TABLET | Refills: 0 | Status: SHIPPED | OUTPATIENT
Start: 2020-12-21 | End: 2021-01-20

## 2020-11-19 RX ORDER — HYDROCODONE BITARTRATE AND ACETAMINOPHEN 10; 325 MG/1; MG/1
1 TABLET ORAL EVERY 6 HOURS PRN
Qty: 120 TABLET | Refills: 0 | Status: SHIPPED | OUTPATIENT
Start: 2021-01-20 | End: 2021-02-18 | Stop reason: SDUPTHER

## 2020-11-19 NOTE — TELEPHONE ENCOUNTER
"Inquired if pt has stopped smoking, pt stated " I cant stop smoking, that's ridiculous. I have had other surgeries with no one telling me to stop smoking. This is discrimination!! " Pt advised that in Dr. Kay's last note , she was advised that he would not and could not do surgery until the pt was tobacco free due to healing . Pt advised again that the provider wants her to stop smoking for the surgery. Pt upset states that " this is an excuse not to do the surgery. I have had surgeries while smoking with no problem. I'm going to report this !!"   Provider advised.   "

## 2020-11-19 NOTE — TELEPHONE ENCOUNTER
V V completed. Please send Rx.    I have reviewed the [place] Board of [employer] website and there are no abberancies.

## 2020-11-20 NOTE — H&P (VIEW-ONLY)
This note was completed with dictation software and grammatical errors may exist.    The patient location is: [facility]  The chief complaint leading to consultation is: back pain  Visit type: Virtual visit with synchronous audio and video  Total time spent with patient: 15 minutes  Each patient to whom he or she provides medical services by telemedicine is:  (1) informed of the relationship between the physician and patient and the respective role of any other health care provider with respect to management of the patient; and (2) notified that he or she may decline to receive medical services by telemedicine and may withdraw from such care at any time.    CC: Neck and bilateral arm pain, back pain, knee pain     HPI: The patient is a 60-year-old woman with a history of hypertension, diabetes, multiple sclerosis, lumbar postlaminectomy syndrome who presents in self-referral for continued low back pain and bilateral leg pain, neck pain.  She is status post C7-T1 interlaminar epidural steroid injection on 10/15/2020 with 80% relief.  She is pleased with these results and no longer has severe neck and arm pain.  Her main complaint is low back pain but she states that there is a different sensation than she had before.  The pain is much worse with standing and walking, improved with sitting and pain medication.  She does feel that she is having weakness in her legs that has progressed.  She reports intermittent numbness in her lower extremity.  She denies having bladder or bowel incontinence.    Pain intervention history: She has been followed by one of our previous [facility] pain management physicians, Dr. Chapa in [place] until recently.  She had undergone fusion surgery in 1983 and again surgery in 1993 for her lumbar spine.  She had undergone numerous lumbar spine injections in the past with good relief.  She has tried gabapentin with only minimal relief but states that Lyrica works best.  Sounds like she  had a spinal cord stimulator trial in the past that did not help.  She has been taking hydrocodone up to 4 times a day for several years.  She is status post caudal epidural steroid injection on 8/6/14 with 60% relief.  She is status post caudal epidural steroid injection on 5/13/15 with 0% relief.  She is status post caudal epidural steroid injection on 10/2/15 with 0% relief.  She is status post C7-T1 cervical interlaminar epidural steroid injection on 9/7/16 with 100% relief of her bilateral arm pain and mild relief of her neck pain.  She is status post C7-T1 cervical interlaminar epidural steroid injection on 2/21/17 with at least 50% relief.  She is status post bilateral L1 transforaminal epidural steroid injections on 3/20/17 with 75% relief. She is status post C7-T1 cervical interlaminar epidural steroid injection on 12/22/17 and again on 2/9/18 with 90% relief of her neck and arm pain.  She is status post bilateral L1/2 transforaminal epidural steroid injections on 08/08/2018 with almost 100% relief of her low back pain.    ROS:She reports urinary frequency, joint stiffness, joint swelling, back pain, memory loss, anxiety, depression, difficulty sleeping and loss of balance.  Balance of review of systems is negative.    Medical, surgical, family and social history reviewed elsewhere in record.    Medications/Allergies: See med card    There were no vitals filed for this visit.      Physical exam:  Alert and oriented x3    Imaging:  MRI C-spine 10/10/17  Comparison MRI cervical spine 08/16/2016.  A minimal reversal of the mid cervical curvature again noted with anatomic alignment.  Degenerative changes of the discs including loss of height and desiccation also again noted at C4-5 and C5-6.  C3-4, moderate right facet arthropathy.  C4-5, bulging of the disc again noted with effacement of the adjacent ventral subarachnoid space.  C5-6, bulging of the disc again noted with superimposed small more focal right  paracentral protrusion of the disc with complete effacement of ventral subarachnoid space and flattening of the ventral cord more so on the right, unchanged.  Overall mild degree of canal stenosis results.  Bilateral uncinate hypertrophy is present with moderate to severe bilateral foraminal stenosis.  C6-7, mild annular bulging slightly asymmetric to the left posteriorly.  C7-T1, small posterior midline/right prominent midline protrusion, unchanged.  Mild annular bulging also suspected T1-2.  The cervical cord maintains normal signal throughout.  No abnormal intra-dural enhancement is seen.    MRI lumbar spine 2/1/17  There are postoperative changes of posterior instrumented fusion at L2-S1.  There is a grade I anterolisthesis of L5 on S1.  The lumbar vertebral bodies show normal height without evidence of acute compression fracture or pathologic marrow replacement process.  There is a levoconvex curvature of the lumbar spine. There is degenerative desiccation, disc space narrowing, and degenerative endplate change at T11-T12, T12-L1, L1-L2, and L5-S1.  There is near complete fusion of the L2, L3, L4, and L5 vertebral bodies.  There are probable postoperative changes of posterior decompression at L2-S1. The conus medullaris terminates at L1. The visualized retroperitoneal/abdominal soft tissue  structures are unremarkable.  T11-T12: There is a broad disc bulge which flattens the ventral aspect of the conus.  There is ligamentum flavum thickening and facet arthropathy.  There is moderate bilateral neuroforaminal stenosis present.  There is mild-moderate central canal stenosis.  T12-L1: There is a broad disc bulge with a superimposed ascending right paracentral disc extrusion which extends approximately 10 mm above the disc plane.  There is mild-moderate central canal stenosis present.  There is mild right neuroforaminal stenosis.  L1-L2: There is a broad disc bulge throughout the period there is ligamentum flavum  thickening.  The central canal is obscured.  There is a suggestion of a possible right paracentral disc protrusion.  There is mild-moderate overall central canal stenosis.  The neural foramina are not assessed secondary to metal artifact.  L2-L3: No central canal or neuroforaminal stenosis. No disc protrusion or extrusion.  L3-L4: No central canal or neuroforaminal stenosis. No disc protrusion or extrusion.  L4-L5: The left neural foramen is not optimally evaluated due to metal artifact.  No central canal stenosis or right neuroforaminal stenosis.  L5-S1: There is a grade I anterolisthesis of L5 on S1 with associated uncovering of the intervertebral disc.  No definite central canal stenosis or neuroforaminal stenosis.      Assessment:  The patient is a 60-year-old woman with a history of hypertension, diabetes, multiple sclerosis, lumbar postlaminectomy syndrome who presents in self-referral for continued low back pain and bilateral leg pain, neck pain.   1. DDD (degenerative disc disease), lumbar  MRI Lumbar Spine Without Contrast   2. Cervical stenosis of spinal canal     3. Postlaminectomy syndrome of lumbar region     4. Opioid contract exists                                                                                                                                                                                                 Plan:  1.  The patient did well following the cervical ROBIN.  This can be repeated in the future if necessary.  2.  I am going to update her lumbar spine MRI for further evaluation for her low back and leg symptoms.  We will contact her with these results and with recommendations.  3.  Dr. Seaman refilled hydrocodone-acetaminophen 10/325 milligrams up to 4 times daily as needed for pain.  I have reviewed the [place] Board of Pharmacy website and there are no abberancies.

## 2020-11-20 NOTE — PROGRESS NOTES
This note was completed with dictation software and grammatical errors may exist.    The patient location is: [facility]  The chief complaint leading to consultation is: back pain  Visit type: Virtual visit with synchronous audio and video  Total time spent with patient: 15 minutes  Each patient to whom he or she provides medical services by telemedicine is:  (1) informed of the relationship between the physician and patient and the respective role of any other health care provider with respect to management of the patient; and (2) notified that he or she may decline to receive medical services by telemedicine and may withdraw from such care at any time.    CC: Neck and bilateral arm pain, back pain, knee pain     HPI: The patient is a 60-year-old woman with a history of hypertension, diabetes, multiple sclerosis, lumbar postlaminectomy syndrome who presents in self-referral for continued low back pain and bilateral leg pain, neck pain.  She is status post C7-T1 interlaminar epidural steroid injection on 10/15/2020 with 80% relief.  She is pleased with these results and no longer has severe neck and arm pain.  Her main complaint is low back pain but she states that there is a different sensation than she had before.  The pain is much worse with standing and walking, improved with sitting and pain medication.  She does feel that she is having weakness in her legs that has progressed.  She reports intermittent numbness in her lower extremity.  She denies having bladder or bowel incontinence.    Pain intervention history: She has been followed by one of our previous [facility] pain management physicians, Dr. Chapa in [place] until recently.  She had undergone fusion surgery in 1983 and again surgery in 1993 for her lumbar spine.  She had undergone numerous lumbar spine injections in the past with good relief.  She has tried gabapentin with only minimal relief but states that Lyrica works best.  Sounds like she  had a spinal cord stimulator trial in the past that did not help.  She has been taking hydrocodone up to 4 times a day for several years.  She is status post caudal epidural steroid injection on 8/6/14 with 60% relief.  She is status post caudal epidural steroid injection on 5/13/15 with 0% relief.  She is status post caudal epidural steroid injection on 10/2/15 with 0% relief.  She is status post C7-T1 cervical interlaminar epidural steroid injection on 9/7/16 with 100% relief of her bilateral arm pain and mild relief of her neck pain.  She is status post C7-T1 cervical interlaminar epidural steroid injection on 2/21/17 with at least 50% relief.  She is status post bilateral L1 transforaminal epidural steroid injections on 3/20/17 with 75% relief. She is status post C7-T1 cervical interlaminar epidural steroid injection on 12/22/17 and again on 2/9/18 with 90% relief of her neck and arm pain.  She is status post bilateral L1/2 transforaminal epidural steroid injections on 08/08/2018 with almost 100% relief of her low back pain.    ROS:She reports urinary frequency, joint stiffness, joint swelling, back pain, memory loss, anxiety, depression, difficulty sleeping and loss of balance.  Balance of review of systems is negative.    Medical, surgical, family and social history reviewed elsewhere in record.    Medications/Allergies: See med card    There were no vitals filed for this visit.      Physical exam:  Alert and oriented x3    Imaging:  MRI C-spine 10/10/17  Comparison MRI cervical spine 08/16/2016.  A minimal reversal of the mid cervical curvature again noted with anatomic alignment.  Degenerative changes of the discs including loss of height and desiccation also again noted at C4-5 and C5-6.  C3-4, moderate right facet arthropathy.  C4-5, bulging of the disc again noted with effacement of the adjacent ventral subarachnoid space.  C5-6, bulging of the disc again noted with superimposed small more focal right  paracentral protrusion of the disc with complete effacement of ventral subarachnoid space and flattening of the ventral cord more so on the right, unchanged.  Overall mild degree of canal stenosis results.  Bilateral uncinate hypertrophy is present with moderate to severe bilateral foraminal stenosis.  C6-7, mild annular bulging slightly asymmetric to the left posteriorly.  C7-T1, small posterior midline/right prominent midline protrusion, unchanged.  Mild annular bulging also suspected T1-2.  The cervical cord maintains normal signal throughout.  No abnormal intra-dural enhancement is seen.    MRI lumbar spine 2/1/17  There are postoperative changes of posterior instrumented fusion at L2-S1.  There is a grade I anterolisthesis of L5 on S1.  The lumbar vertebral bodies show normal height without evidence of acute compression fracture or pathologic marrow replacement process.  There is a levoconvex curvature of the lumbar spine. There is degenerative desiccation, disc space narrowing, and degenerative endplate change at T11-T12, T12-L1, L1-L2, and L5-S1.  There is near complete fusion of the L2, L3, L4, and L5 vertebral bodies.  There are probable postoperative changes of posterior decompression at L2-S1. The conus medullaris terminates at L1. The visualized retroperitoneal/abdominal soft tissue  structures are unremarkable.  T11-T12: There is a broad disc bulge which flattens the ventral aspect of the conus.  There is ligamentum flavum thickening and facet arthropathy.  There is moderate bilateral neuroforaminal stenosis present.  There is mild-moderate central canal stenosis.  T12-L1: There is a broad disc bulge with a superimposed ascending right paracentral disc extrusion which extends approximately 10 mm above the disc plane.  There is mild-moderate central canal stenosis present.  There is mild right neuroforaminal stenosis.  L1-L2: There is a broad disc bulge throughout the period there is ligamentum flavum  thickening.  The central canal is obscured.  There is a suggestion of a possible right paracentral disc protrusion.  There is mild-moderate overall central canal stenosis.  The neural foramina are not assessed secondary to metal artifact.  L2-L3: No central canal or neuroforaminal stenosis. No disc protrusion or extrusion.  L3-L4: No central canal or neuroforaminal stenosis. No disc protrusion or extrusion.  L4-L5: The left neural foramen is not optimally evaluated due to metal artifact.  No central canal stenosis or right neuroforaminal stenosis.  L5-S1: There is a grade I anterolisthesis of L5 on S1 with associated uncovering of the intervertebral disc.  No definite central canal stenosis or neuroforaminal stenosis.      Assessment:  The patient is a 60-year-old woman with a history of hypertension, diabetes, multiple sclerosis, lumbar postlaminectomy syndrome who presents in self-referral for continued low back pain and bilateral leg pain, neck pain.   1. DDD (degenerative disc disease), lumbar  MRI Lumbar Spine Without Contrast   2. Cervical stenosis of spinal canal     3. Postlaminectomy syndrome of lumbar region     4. Opioid contract exists                                                                                                                                                                                                 Plan:  1.  The patient did well following the cervical ROBIN.  This can be repeated in the future if necessary.  2.  I am going to update her lumbar spine MRI for further evaluation for her low back and leg symptoms.  We will contact her with these results and with recommendations.  3.  Dr. Seaman refilled hydrocodone-acetaminophen 10/325 milligrams up to 4 times daily as needed for pain.  I have reviewed the [place] Board [employer] website and there are no abberancies.

## 2020-11-27 ENCOUNTER — PATIENT MESSAGE (OUTPATIENT)
Dept: PAIN MEDICINE | Facility: CLINIC | Age: 60
End: 2020-11-27

## 2020-11-30 ENCOUNTER — PATIENT MESSAGE (OUTPATIENT)
Dept: PAIN MEDICINE | Facility: CLINIC | Age: 60
End: 2020-11-30

## 2020-11-30 ENCOUNTER — TELEPHONE (OUTPATIENT)
Dept: PAIN MEDICINE | Facility: CLINIC | Age: 60
End: 2020-11-30

## 2020-11-30 NOTE — TELEPHONE ENCOUNTER
Please let the patient know I reviewed her new lumbar spine MRI and she does have some progression of degeneration above her fusion at L1/2.  Please offer to schedule her for bilateral L1/2 transforaminal epidural steroid injections.  If this does not help I suggest following up with Neurosurgery.

## 2020-12-01 ENCOUNTER — PATIENT MESSAGE (OUTPATIENT)
Dept: PAIN MEDICINE | Facility: CLINIC | Age: 60
End: 2020-12-01

## 2020-12-01 ENCOUNTER — TELEPHONE (OUTPATIENT)
Dept: PAIN MEDICINE | Facility: CLINIC | Age: 60
End: 2020-12-01

## 2020-12-01 DIAGNOSIS — Z11.9 SCREENING EXAMINATION FOR INFECTIOUS DISEASE: ICD-10-CM

## 2020-12-01 DIAGNOSIS — M54.16 LUMBAR RADICULOPATHY: Primary | ICD-10-CM

## 2020-12-01 RX ORDER — ALPRAZOLAM 0.5 MG/1
1 TABLET, ORALLY DISINTEGRATING ORAL ONCE AS NEEDED
Status: CANCELLED | OUTPATIENT
Start: 2020-12-08 | End: 2032-05-05

## 2020-12-05 ENCOUNTER — LAB VISIT (OUTPATIENT)
Dept: FAMILY MEDICINE | Facility: CLINIC | Age: 60
End: 2020-12-05
Payer: MEDICARE

## 2020-12-05 DIAGNOSIS — Z11.9 SCREENING EXAMINATION FOR INFECTIOUS DISEASE: ICD-10-CM

## 2020-12-05 PROCEDURE — U0003 INFECTIOUS AGENT DETECTION BY NUCLEIC ACID (DNA OR RNA); SEVERE ACUTE RESPIRATORY SYNDROME CORONAVIRUS 2 (SARS-COV-2) (CORONAVIRUS DISEASE [COVID-19]), AMPLIFIED PROBE TECHNIQUE, MAKING USE OF HIGH THROUGHPUT TECHNOLOGIES AS DESCRIBED BY CMS-2020-01-R: HCPCS

## 2020-12-06 LAB — SARS-COV-2 RNA RESP QL NAA+PROBE: NOT DETECTED

## 2020-12-08 ENCOUNTER — HOSPITAL ENCOUNTER (OUTPATIENT)
Facility: HOSPITAL | Age: 60
Discharge: HOME | End: 2020-12-08
Attending: ANESTHESIOLOGY | Admitting: ANESTHESIOLOGY
Payer: MEDICARE

## 2020-12-08 ENCOUNTER — HOSPITAL ENCOUNTER (OUTPATIENT)
Dept: RADIOLOGY | Facility: HOSPITAL | Age: 60
Discharge: HOME | End: 2020-12-08
Attending: ANESTHESIOLOGY | Admitting: ANESTHESIOLOGY
Payer: MEDICARE

## 2020-12-08 VITALS
TEMPERATURE: 98 F | HEIGHT: 65 IN | WEIGHT: 220 LBS | DIASTOLIC BLOOD PRESSURE: 75 MMHG | RESPIRATION RATE: 18 BRPM | SYSTOLIC BLOOD PRESSURE: 145 MMHG | HEART RATE: 64 BPM | BODY MASS INDEX: 36.65 KG/M2 | OXYGEN SATURATION: 98 %

## 2020-12-08 DIAGNOSIS — M54.16 LUMBAR RADICULOPATHY: Primary | ICD-10-CM

## 2020-12-08 DIAGNOSIS — M51.36 DDD (DEGENERATIVE DISC DISEASE), LUMBAR: ICD-10-CM

## 2020-12-08 LAB — GLUCOSE SERPL-MCNC: 123 MG/DL (ref 70–110)

## 2020-12-08 PROCEDURE — 76000 FLUOROSCOPY <1 HR PHYS/QHP: CPT | Mod: TC,PO

## 2020-12-08 PROCEDURE — 64483 NJX AA&/STRD TFRM EPI L/S 1: CPT | Mod: 50,,, | Performed by: ANESTHESIOLOGY

## 2020-12-08 PROCEDURE — 82962 GLUCOSE BLOOD TEST: CPT | Mod: PO | Performed by: ANESTHESIOLOGY

## 2020-12-08 PROCEDURE — 25500020 PHARM REV CODE 255: Mod: PO | Performed by: ANESTHESIOLOGY

## 2020-12-08 PROCEDURE — 64483 NJX AA&/STRD TFRM EPI L/S 1: CPT | Mod: 50,PO | Performed by: ANESTHESIOLOGY

## 2020-12-08 PROCEDURE — 63600175 PHARM REV CODE 636 W HCPCS: Mod: PO | Performed by: ANESTHESIOLOGY

## 2020-12-08 PROCEDURE — 25000003 PHARM REV CODE 250: Mod: PO | Performed by: ANESTHESIOLOGY

## 2020-12-08 PROCEDURE — 64483 PR EPIDURAL INJ, ANES/STEROID, TRANSFORAMINAL, LUMB/SACR, SNGL LEVL: ICD-10-PCS | Mod: 50,,, | Performed by: ANESTHESIOLOGY

## 2020-12-08 RX ORDER — METHYLPREDNISOLONE ACETATE 80 MG/ML
INJECTION, SUSPENSION INTRA-ARTICULAR; INTRALESIONAL; INTRAMUSCULAR; SOFT TISSUE
Status: DISCONTINUED | OUTPATIENT
Start: 2020-12-08 | End: 2020-12-08 | Stop reason: HOSPADM

## 2020-12-08 RX ORDER — ALPRAZOLAM 0.5 MG/1
1 TABLET, ORALLY DISINTEGRATING ORAL ONCE AS NEEDED
Status: COMPLETED | OUTPATIENT
Start: 2020-12-08 | End: 2020-12-08

## 2020-12-08 RX ORDER — BUPIVACAINE HYDROCHLORIDE 2.5 MG/ML
INJECTION, SOLUTION EPIDURAL; INFILTRATION; INTRACAUDAL
Status: DISCONTINUED | OUTPATIENT
Start: 2020-12-08 | End: 2020-12-08 | Stop reason: HOSPADM

## 2020-12-08 RX ORDER — LIDOCAINE HYDROCHLORIDE 10 MG/ML
INJECTION, SOLUTION EPIDURAL; INFILTRATION; INTRACAUDAL; PERINEURAL
Status: DISCONTINUED | OUTPATIENT
Start: 2020-12-08 | End: 2020-12-08 | Stop reason: HOSPADM

## 2020-12-08 RX ADMIN — ALPRAZOLAM 1 MG: 0.5 TABLET, ORALLY DISINTEGRATING ORAL at 01:12

## 2020-12-08 NOTE — INTERVAL H&P NOTE
The patient has been examined and the H&P has been reviewed:    I concur with the findings and no changes have occurred since H&P was written.    Anesthesia/Surgery risks, benefits and alternative options discussed and understood by patient/family.    ASA 3, mallampati 2        Active Hospital Problems    Diagnosis  POA    Lumbar radiculopathy [M54.16]  Yes      Resolved Hospital Problems   No resolved problems to display.

## 2020-12-08 NOTE — DISCHARGE SUMMARY
[facility]  Discharge Note  Short Stay    Procedure(s) (LRB):  Injection,steroid,epidural,transforaminal approach, l1/2 (Bilateral)    OUTCOME: Patient tolerated treatment/procedure well without complication and is now ready for discharge.    DISPOSITION: Home or Self Care    FINAL DIAGNOSIS:  Lumbar radiculopathy    FOLLOWUP: In clinic    DISCHARGE INSTRUCTIONS:    Discharge Procedure Orders   Diet Adult Regular     Notify your health care provider if you experience any of the following:  temperature >100.4     No dressing needed     Activity as tolerated

## 2020-12-08 NOTE — OP NOTE
PROCEDURE DATE: 12/8/2020    PROCEDURE: Bilateral L1/2 transforaminal epidural steroid injection under fluoroscopy    DIAGNOSIS: Lumbar  Radiculopathy    Post op diagnosis: Same    PHYSICIAN: Jeffry Seaman MD    MEDICATIONS INJECTED:  Methylprednisolone 40mg (0.5ml) and 1.5ml 0.25% bupivicaine at each nerve root.     LOCAL ANESTHETIC INJECTED:  Lidocaine 1%. 4 ml per site.    SEDATION MEDICATIONS: none    ESTIMATED BLOOD LOSS:  none    COMPLICATIONS:  non3    TECHNIQUE:   A time-out was taken to identify patient and procedure side prior to starting the procedure. The patient was placed in a prone position, prepped and draped in the usual sterile fashion using ChloraPrep and sterile towels.  The area to be injected was determined under fluoroscopic guidance in AP and oblique view.  Local anesthetic was given by raising a wheal and going down to the hub of a 25-gauge 1.5 inch needle.  In oblique view, a 3.5 inch 22-gauge bent-tip spinal needle was introduced towards 6 oclock position of the pedicle of each above named nerve root level.  The needle was walked medially then hinged into the neural foramen and position was confirmed in AP and lateral views.  Omnipaque contrast dye was injected to confirm appropriate placement and that there was no vascular uptake.  After negative aspiration for blood or CSF, the medication was then injected. This was performed at the right and then left L1/2 level(s). The patient tolerated the procedure well.    The patient was monitored after the procedure.  Patient was given post procedure and discharge instructions to follow at home. The patient was discharged in a stable condition.

## 2020-12-08 NOTE — DISCHARGE INSTRUCTIONS
PAIN MANAGEMENT    Home care instructions   Apply ice pack to the injection site for 20 minute prior for the first 24 hours for soreness/discomfort at injection site   DO NOT USE HEAT FOR 24 HOURS   Keep site clean and dry for 24 hours, remove bandaid when desired   Do not drive until tomorrow  Take care when walking after a lumbar injection     STEROIDS OR RADIOFREQUENCY    May take 10-14 days for full effects  Avoid strenuous exercises for 2 days.      Resume Aspirin, Plavix, or Coumadin the day after the procedure unless other wise instructed  Resume home medication as prescribed today      CALL PHYSICIAN FOR:   Severe increase in your usual pain or appearance of new pain   Prolonged or increasing weakness or numbness in the legs or arms   Fever greater then 100 degrees F..   Drainage from the incision site, redness, active bleeding or increased swelling at the injection site   Headache that increases when your head is upright and decreases when you lie flat    FOR EMERGENCIES:   Go directly to Emergency Department for Shortness of breath, chest pain, or problems breathing

## 2021-02-18 ENCOUNTER — TELEPHONE (OUTPATIENT)
Dept: PAIN MEDICINE | Facility: CLINIC | Age: 61
End: 2021-02-18

## 2021-02-18 RX ORDER — HYDROCODONE BITARTRATE AND ACETAMINOPHEN 10; 325 MG/1; MG/1
1 TABLET ORAL EVERY 6 HOURS PRN
Qty: 120 TABLET | Refills: 0 | Status: SHIPPED | OUTPATIENT
Start: 2021-02-19 | End: 2021-03-18 | Stop reason: SDUPTHER

## 2021-03-04 RX ORDER — PREGABALIN 50 MG/1
CAPSULE ORAL
Qty: 90 CAPSULE | Refills: 2 | Status: SHIPPED | OUTPATIENT
Start: 2021-03-04 | End: 2021-06-07 | Stop reason: SDUPTHER

## 2021-03-18 ENCOUNTER — TELEPHONE (OUTPATIENT)
Dept: PAIN MEDICINE | Facility: CLINIC | Age: 61
End: 2021-03-18

## 2021-03-18 RX ORDER — HYDROCODONE BITARTRATE AND ACETAMINOPHEN 10; 325 MG/1; MG/1
1 TABLET ORAL EVERY 6 HOURS PRN
Qty: 120 TABLET | Refills: 0 | Status: SHIPPED | OUTPATIENT
Start: 2021-03-21 | End: 2021-04-13 | Stop reason: SDUPTHER

## 2021-03-23 ENCOUNTER — TELEPHONE (OUTPATIENT)
Dept: PAIN MEDICINE | Facility: CLINIC | Age: 61
End: 2021-03-23

## 2021-04-06 NOTE — DISCHARGE SUMMARY
[facility]  Discharge Note  Short Stay      [facility]  Discharge Note  Short Stay    Admit Date: 10/15/2020    Discharge Date: 10/15/2020    Attending Physician: Jeffry Seaman MD     Discharge Provider: Jeffry Seaman    Diagnoses:  Active Hospital Problems    Diagnosis  POA    *Cervical radiculopathy [M54.12]  Yes      Resolved Hospital Problems   No resolved problems to display.       Discharged Condition: good    Final Diagnoses: Cervical radiculopathy [M54.12]    Disposition: Home or Self Care    Hospital Course: no complications, uneventful    Outcome of Hospitalization, Treatment, Procedure, or Surgery:  Patient was admitted for outpatient procedure. The patient underwent procedure without complications and are discharged home    Follow up/Patient Instructions:  Follow up as scheduled in Pain Management clinic in 3-4 weeks/Patient has received instructions and follow up date and time    Medications:  Continue previous medications    Discharge Procedure Orders   Call MD for:  temperature >100.4     Call MD for:  severe uncontrolled pain     Call MD for:  redness, tenderness, or signs of infection (pain, swelling, redness, odor or green/yellow discharge around incision site)     Call MD for:  severe persistent headache     No dressing needed         Discharge Procedure Orders (must include Diet, Follow-up, Activity):   Discharge Procedure Orders (must include Diet, Follow-up, Activity)   Call MD for:  temperature >100.4     Call MD for:  severe uncontrolled pain     Call MD for:  redness, tenderness, or signs of infection (pain, swelling, redness, odor or green/yellow discharge around incision site)     Call MD for:  severe persistent headache     No dressing needed             [de-identified] : slight scratchy throat [[email or web address]] : cough\par no covid exposure.\par Family had been home last week.\par no fever\par drinking fluids OK

## 2021-04-13 ENCOUNTER — OFFICE VISIT (OUTPATIENT)
Dept: PAIN MEDICINE | Facility: CLINIC | Age: 61
End: 2021-04-13
Payer: MEDICARE

## 2021-04-13 ENCOUNTER — TELEPHONE (OUTPATIENT)
Dept: PAIN MEDICINE | Facility: CLINIC | Age: 61
End: 2021-04-13

## 2021-04-13 DIAGNOSIS — M54.16 LUMBAR RADICULOPATHY: Primary | ICD-10-CM

## 2021-04-13 DIAGNOSIS — M48.02 CERVICAL STENOSIS OF SPINAL CANAL: ICD-10-CM

## 2021-04-13 DIAGNOSIS — Z79.891 OPIOID CONTRACT EXISTS: ICD-10-CM

## 2021-04-13 DIAGNOSIS — M96.1 POSTLAMINECTOMY SYNDROME OF LUMBAR REGION: ICD-10-CM

## 2021-04-13 DIAGNOSIS — M51.36 DDD (DEGENERATIVE DISC DISEASE), LUMBAR: ICD-10-CM

## 2021-04-13 DIAGNOSIS — M54.12 CERVICAL RADICULOPATHY: ICD-10-CM

## 2021-04-13 PROCEDURE — 99214 PR OFFICE/OUTPT VISIT, EST, LEVL IV, 30-39 MIN: ICD-10-PCS | Mod: 95,,, | Performed by: PHYSICIAN ASSISTANT

## 2021-04-13 PROCEDURE — 99214 OFFICE O/P EST MOD 30 MIN: CPT | Mod: 95,,, | Performed by: PHYSICIAN ASSISTANT

## 2021-04-13 RX ORDER — HYDROCODONE BITARTRATE AND ACETAMINOPHEN 10; 325 MG/1; MG/1
1 TABLET ORAL EVERY 6 HOURS PRN
Qty: 120 TABLET | Refills: 0 | Status: SHIPPED | OUTPATIENT
Start: 2021-05-20 | End: 2021-06-07 | Stop reason: SDUPTHER

## 2021-04-13 RX ORDER — HYDROCODONE BITARTRATE AND ACETAMINOPHEN 10; 325 MG/1; MG/1
1 TABLET ORAL EVERY 6 HOURS PRN
Qty: 120 TABLET | Refills: 0 | Status: SHIPPED | OUTPATIENT
Start: 2021-06-19 | End: 2021-07-19

## 2021-04-13 RX ORDER — DICLOFENAC SODIUM 10 MG/G
2 GEL TOPICAL 4 TIMES DAILY PRN
Qty: 3 TUBE | Refills: 5 | Status: SHIPPED | OUTPATIENT
Start: 2021-04-13 | End: 2022-08-18

## 2021-04-13 RX ORDER — HYDROCODONE BITARTRATE AND ACETAMINOPHEN 10; 325 MG/1; MG/1
1 TABLET ORAL EVERY 6 HOURS PRN
Qty: 120 TABLET | Refills: 0 | Status: SHIPPED | OUTPATIENT
Start: 2021-04-20 | End: 2021-05-20

## 2021-05-18 ENCOUNTER — PATIENT MESSAGE (OUTPATIENT)
Dept: PAIN MEDICINE | Facility: CLINIC | Age: 61
End: 2021-05-18

## 2021-05-26 ENCOUNTER — PATIENT MESSAGE (OUTPATIENT)
Dept: PAIN MEDICINE | Facility: CLINIC | Age: 61
End: 2021-05-26

## 2021-05-27 ENCOUNTER — PATIENT MESSAGE (OUTPATIENT)
Dept: PAIN MEDICINE | Facility: CLINIC | Age: 61
End: 2021-05-27

## 2021-06-04 ENCOUNTER — OFFICE VISIT (OUTPATIENT)
Dept: PAIN MEDICINE | Facility: CLINIC | Age: 61
End: 2021-06-04
Payer: MEDICARE

## 2021-06-04 VITALS
SYSTOLIC BLOOD PRESSURE: 123 MMHG | TEMPERATURE: 98 F | RESPIRATION RATE: 18 BRPM | HEART RATE: 86 BPM | OXYGEN SATURATION: 96 % | DIASTOLIC BLOOD PRESSURE: 70 MMHG

## 2021-06-04 DIAGNOSIS — M48.02 CERVICAL STENOSIS OF SPINAL CANAL: ICD-10-CM

## 2021-06-04 DIAGNOSIS — M96.1 POSTLAMINECTOMY SYNDROME OF LUMBAR REGION: ICD-10-CM

## 2021-06-04 DIAGNOSIS — M51.36 DDD (DEGENERATIVE DISC DISEASE), LUMBAR: ICD-10-CM

## 2021-06-04 DIAGNOSIS — Z79.891 OPIOID CONTRACT EXISTS: ICD-10-CM

## 2021-06-04 DIAGNOSIS — M54.16 LUMBAR RADICULOPATHY: Primary | ICD-10-CM

## 2021-06-04 DIAGNOSIS — M54.12 CERVICAL RADICULOPATHY: ICD-10-CM

## 2021-06-04 PROCEDURE — 99999 PR PBB SHADOW E&M-EST. PATIENT-LVL V: CPT | Mod: PBBFAC,,, | Performed by: PHYSICIAN ASSISTANT

## 2021-06-04 PROCEDURE — 99999 PR PBB SHADOW E&M-EST. PATIENT-LVL V: ICD-10-PCS | Mod: PBBFAC,,, | Performed by: PHYSICIAN ASSISTANT

## 2021-06-04 PROCEDURE — 80307 DRUG TEST PRSMV CHEM ANLYZR: CPT | Performed by: PHYSICIAN ASSISTANT

## 2021-06-04 PROCEDURE — 99214 OFFICE O/P EST MOD 30 MIN: CPT | Mod: S$GLB,,, | Performed by: PHYSICIAN ASSISTANT

## 2021-06-04 PROCEDURE — 99214 PR OFFICE/OUTPT VISIT, EST, LEVL IV, 30-39 MIN: ICD-10-PCS | Mod: S$GLB,,, | Performed by: PHYSICIAN ASSISTANT

## 2021-06-04 PROCEDURE — 1125F AMNT PAIN NOTED PAIN PRSNT: CPT | Mod: S$GLB,,, | Performed by: PHYSICIAN ASSISTANT

## 2021-06-04 PROCEDURE — 1125F PR PAIN SEVERITY QUANTIFIED, PAIN PRESENT: ICD-10-PCS | Mod: S$GLB,,, | Performed by: PHYSICIAN ASSISTANT

## 2021-06-08 RX ORDER — HYDROCODONE BITARTRATE AND ACETAMINOPHEN 10; 325 MG/1; MG/1
1 TABLET ORAL EVERY 6 HOURS PRN
Qty: 120 TABLET | Refills: 0 | Status: SHIPPED | OUTPATIENT
Start: 2021-08-18 | End: 2021-09-10

## 2021-06-08 RX ORDER — HYDROCODONE BITARTRATE AND ACETAMINOPHEN 10; 325 MG/1; MG/1
1 TABLET ORAL EVERY 6 HOURS PRN
Qty: 120 TABLET | Refills: 0 | Status: SHIPPED | OUTPATIENT
Start: 2021-07-19 | End: 2021-08-18

## 2021-06-08 RX ORDER — PREGABALIN 50 MG/1
CAPSULE ORAL
Qty: 90 CAPSULE | Refills: 2 | Status: SHIPPED | OUTPATIENT
Start: 2021-06-08 | End: 2022-01-25

## 2021-06-09 LAB
6MAM UR QL: NOT DETECTED
7AMINOCLONAZEPAM UR QL: NOT DETECTED
A-OH ALPRAZ UR QL: NOT DETECTED
ALPHA-OH-MIDAZOLAM: NOT DETECTED
ALPRAZ UR QL: NOT DETECTED
AMPHET UR QL SCN: NOT DETECTED
ANNOTATION COMMENT IMP: NORMAL
ANNOTATION COMMENT IMP: NORMAL
BARBITURATES UR QL: NOT DETECTED
BUPRENORPHINE UR QL: NOT DETECTED
BZE UR QL: NOT DETECTED
CARBOXYTHC UR QL: NOT DETECTED
CARISOPRODOL UR QL: NOT DETECTED
CLONAZEPAM UR QL: NOT DETECTED
CODEINE UR QL: NOT DETECTED
CREAT UR-MCNC: 46.8 MG/DL (ref 20–400)
DIAZEPAM UR QL: NOT DETECTED
ETHYL GLUCURONIDE UR QL: NOT DETECTED
FENTANYL UR QL: NOT DETECTED
GABAPENTIN: NOT DETECTED
HYDROCODONE UR QL: PRESENT
HYDROMORPHONE UR QL: PRESENT
LORAZEPAM UR QL: NOT DETECTED
MDA UR QL: NOT DETECTED
MDEA UR QL: NOT DETECTED
MDMA UR QL: NOT DETECTED
ME-PHENIDATE UR QL: NOT DETECTED
METHADONE UR QL: NOT DETECTED
METHAMPHET UR QL: NOT DETECTED
MIDAZOLAM UR QL SCN: NOT DETECTED
MORPHINE UR QL: NOT DETECTED
NALOXONE: NOT DETECTED
NORBUPRENORPHINE UR QL CFM: NOT DETECTED
NORDIAZEPAM UR QL: NOT DETECTED
NORFENTANYL UR QL: NOT DETECTED
NORHYDROCODONE UR QL CFM: PRESENT
NORMEPERIDINE UR QL CFM: NOT DETECTED
NOROXYCODONE UR QL CFM: NOT DETECTED
NOROXYMORPHONE UR QL SCN: NOT DETECTED
OXAZEPAM UR QL: NOT DETECTED
OXYCODONE UR QL: NOT DETECTED
OXYMORPHONE UR QL: NOT DETECTED
PATHOLOGY STUDY: NORMAL
PCP UR QL: NOT DETECTED
PHENTERMINE UR QL: NOT DETECTED
PREGABALIN: PRESENT
SERVICE CMNT-IMP: NORMAL
TAPENTADOL UR QL SCN: NOT DETECTED
TAPENTADOL-O-SULF: NOT DETECTED
TEMAZEPAM UR QL: NOT DETECTED
TRAMADOL UR QL: NOT DETECTED
ZOLPIDEM METABOLITE: NOT DETECTED
ZOLPIDEM UR QL: NOT DETECTED

## 2021-07-14 ENCOUNTER — PATIENT MESSAGE (OUTPATIENT)
Dept: PAIN MEDICINE | Facility: CLINIC | Age: 61
End: 2021-07-14

## 2021-07-15 ENCOUNTER — PATIENT MESSAGE (OUTPATIENT)
Dept: PAIN MEDICINE | Facility: CLINIC | Age: 61
End: 2021-07-15

## 2021-07-15 DIAGNOSIS — M54.12 CERVICAL RADICULOPATHY: Primary | ICD-10-CM

## 2021-07-15 RX ORDER — SODIUM CHLORIDE, SODIUM LACTATE, POTASSIUM CHLORIDE, CALCIUM CHLORIDE 600; 310; 30; 20 MG/100ML; MG/100ML; MG/100ML; MG/100ML
INJECTION, SOLUTION INTRAVENOUS CONTINUOUS
Status: CANCELLED | OUTPATIENT
Start: 2021-08-03

## 2021-07-29 ENCOUNTER — PATIENT MESSAGE (OUTPATIENT)
Dept: PAIN MEDICINE | Facility: CLINIC | Age: 61
End: 2021-07-29

## 2021-07-29 ENCOUNTER — TELEPHONE (OUTPATIENT)
Dept: PAIN MEDICINE | Facility: CLINIC | Age: 61
End: 2021-07-29

## 2021-07-29 DIAGNOSIS — Z11.9 SCREENING EXAMINATION FOR INFECTIOUS DISEASE: ICD-10-CM

## 2021-07-30 ENCOUNTER — PATIENT MESSAGE (OUTPATIENT)
Dept: PAIN MEDICINE | Facility: CLINIC | Age: 61
End: 2021-07-30

## 2021-07-30 DIAGNOSIS — Z11.9 SCREENING EXAMINATION FOR INFECTIOUS DISEASE: ICD-10-CM

## 2021-08-02 DIAGNOSIS — M54.12 CERVICAL RADICULOPATHY: Primary | ICD-10-CM

## 2021-08-03 ENCOUNTER — HOSPITAL ENCOUNTER (OUTPATIENT)
Dept: RADIOLOGY | Facility: HOSPITAL | Age: 61
Discharge: HOME | End: 2021-08-03
Attending: ANESTHESIOLOGY
Payer: MEDICARE

## 2021-08-03 ENCOUNTER — HOSPITAL ENCOUNTER (OUTPATIENT)
Facility: HOSPITAL | Age: 61
Discharge: HOME | End: 2021-08-03
Attending: ANESTHESIOLOGY | Admitting: ANESTHESIOLOGY
Payer: MEDICARE

## 2021-08-03 VITALS
TEMPERATURE: 98 F | SYSTOLIC BLOOD PRESSURE: 164 MMHG | DIASTOLIC BLOOD PRESSURE: 80 MMHG | OXYGEN SATURATION: 100 % | RESPIRATION RATE: 16 BRPM | WEIGHT: 213.38 LBS | HEIGHT: 65 IN | HEART RATE: 84 BPM | BODY MASS INDEX: 35.55 KG/M2

## 2021-08-03 DIAGNOSIS — M54.12 CERVICAL RADICULOPATHY: ICD-10-CM

## 2021-08-03 LAB
GLUCOSE SERPL-MCNC: 121 MG/DL (ref 70–110)
SARS-COV-2 RDRP RESP QL NAA+PROBE: NEGATIVE

## 2021-08-03 PROCEDURE — 62321 NJX INTERLAMINAR CRV/THRC: CPT | Mod: ,,, | Performed by: ANESTHESIOLOGY

## 2021-08-03 PROCEDURE — 62321 NJX INTERLAMINAR CRV/THRC: CPT | Mod: PO | Performed by: ANESTHESIOLOGY

## 2021-08-03 PROCEDURE — 62321 PR INJ CERV/THORAC, W/GUIDANCE: ICD-10-PCS | Mod: ,,, | Performed by: ANESTHESIOLOGY

## 2021-08-03 PROCEDURE — 82962 GLUCOSE BLOOD TEST: CPT | Mod: PO | Performed by: ANESTHESIOLOGY

## 2021-08-03 PROCEDURE — 63600175 PHARM REV CODE 636 W HCPCS: Mod: PO | Performed by: ANESTHESIOLOGY

## 2021-08-03 PROCEDURE — 25500020 PHARM REV CODE 255: Mod: PO | Performed by: ANESTHESIOLOGY

## 2021-08-03 PROCEDURE — 25000003 PHARM REV CODE 250: Mod: PO | Performed by: ANESTHESIOLOGY

## 2021-08-03 PROCEDURE — 76000 FLUOROSCOPY <1 HR PHYS/QHP: CPT | Mod: TC,PO

## 2021-08-03 PROCEDURE — U0002 COVID-19 LAB TEST NON-CDC: HCPCS | Mod: PO | Performed by: ANESTHESIOLOGY

## 2021-08-03 PROCEDURE — A4216 STERILE WATER/SALINE, 10 ML: HCPCS | Mod: PO | Performed by: ANESTHESIOLOGY

## 2021-08-03 RX ORDER — SODIUM CHLORIDE, SODIUM LACTATE, POTASSIUM CHLORIDE, CALCIUM CHLORIDE 600; 310; 30; 20 MG/100ML; MG/100ML; MG/100ML; MG/100ML
INJECTION, SOLUTION INTRAVENOUS CONTINUOUS
Status: DISCONTINUED | OUTPATIENT
Start: 2021-08-03 | End: 2021-08-03 | Stop reason: HOSPADM

## 2021-08-03 RX ORDER — METHYLPREDNISOLONE ACETATE 80 MG/ML
INJECTION, SUSPENSION INTRA-ARTICULAR; INTRALESIONAL; INTRAMUSCULAR; SOFT TISSUE
Status: DISCONTINUED | OUTPATIENT
Start: 2021-08-03 | End: 2021-08-03 | Stop reason: HOSPADM

## 2021-08-03 RX ORDER — LIDOCAINE HYDROCHLORIDE 10 MG/ML
INJECTION, SOLUTION EPIDURAL; INFILTRATION; INTRACAUDAL; PERINEURAL
Status: DISCONTINUED | OUTPATIENT
Start: 2021-08-03 | End: 2021-08-03 | Stop reason: HOSPADM

## 2021-08-03 RX ORDER — MIDAZOLAM HYDROCHLORIDE 1 MG/ML
INJECTION INTRAMUSCULAR; INTRAVENOUS
Status: DISCONTINUED | OUTPATIENT
Start: 2021-08-03 | End: 2021-08-03 | Stop reason: HOSPADM

## 2021-08-03 RX ORDER — PREGABALIN 75 MG/1
75 CAPSULE ORAL 3 TIMES DAILY
Qty: 90 CAPSULE | Refills: 2 | Status: SHIPPED | OUTPATIENT
Start: 2021-08-03 | End: 2021-11-17

## 2021-08-03 RX ORDER — SODIUM CHLORIDE 9 MG/ML
INJECTION, SOLUTION INTRAMUSCULAR; INTRAVENOUS; SUBCUTANEOUS
Status: DISCONTINUED | OUTPATIENT
Start: 2021-08-03 | End: 2021-08-03 | Stop reason: HOSPADM

## 2021-08-03 RX ADMIN — SODIUM CHLORIDE, SODIUM LACTATE, POTASSIUM CHLORIDE, AND CALCIUM CHLORIDE: .6; .31; .03; .02 INJECTION, SOLUTION INTRAVENOUS at 02:08

## 2021-09-10 ENCOUNTER — PATIENT MESSAGE (OUTPATIENT)
Dept: PAIN MEDICINE | Facility: CLINIC | Age: 61
End: 2021-09-10

## 2021-09-10 ENCOUNTER — TELEPHONE (OUTPATIENT)
Dept: PAIN MEDICINE | Facility: CLINIC | Age: 61
End: 2021-09-10

## 2021-09-10 ENCOUNTER — OFFICE VISIT (OUTPATIENT)
Dept: PAIN MEDICINE | Facility: CLINIC | Age: 61
End: 2021-09-10
Payer: MEDICARE

## 2021-09-10 DIAGNOSIS — Z79.891 OPIOID CONTRACT EXISTS: ICD-10-CM

## 2021-09-10 DIAGNOSIS — M54.12 CERVICAL RADICULOPATHY: Primary | ICD-10-CM

## 2021-09-10 DIAGNOSIS — M48.02 CERVICAL STENOSIS OF SPINAL CANAL: ICD-10-CM

## 2021-09-10 DIAGNOSIS — M96.1 POSTLAMINECTOMY SYNDROME OF LUMBAR REGION: ICD-10-CM

## 2021-09-10 DIAGNOSIS — M51.36 DDD (DEGENERATIVE DISC DISEASE), LUMBAR: ICD-10-CM

## 2021-09-10 DIAGNOSIS — M50.30 DDD (DEGENERATIVE DISC DISEASE), CERVICAL: Primary | ICD-10-CM

## 2021-09-10 PROCEDURE — 99214 PR OFFICE/OUTPT VISIT, EST, LEVL IV, 30-39 MIN: ICD-10-PCS | Mod: 95,,, | Performed by: PHYSICIAN ASSISTANT

## 2021-09-10 PROCEDURE — 1159F PR MEDICATION LIST DOCUMENTED IN MEDICAL RECORD: ICD-10-PCS | Mod: CPTII,95,, | Performed by: PHYSICIAN ASSISTANT

## 2021-09-10 PROCEDURE — 1159F MED LIST DOCD IN RCRD: CPT | Mod: CPTII,95,, | Performed by: PHYSICIAN ASSISTANT

## 2021-09-10 PROCEDURE — 1160F RVW MEDS BY RX/DR IN RCRD: CPT | Mod: CPTII,95,, | Performed by: PHYSICIAN ASSISTANT

## 2021-09-10 PROCEDURE — 99214 OFFICE O/P EST MOD 30 MIN: CPT | Mod: 95,,, | Performed by: PHYSICIAN ASSISTANT

## 2021-09-10 PROCEDURE — 1160F PR REVIEW ALL MEDS BY PRESCRIBER/CLIN PHARMACIST DOCUMENTED: ICD-10-PCS | Mod: CPTII,95,, | Performed by: PHYSICIAN ASSISTANT

## 2021-09-10 RX ORDER — HYDROCODONE BITARTRATE AND ACETAMINOPHEN 10; 325 MG/1; MG/1
1 TABLET ORAL EVERY 6 HOURS PRN
Qty: 120 TABLET | Refills: 0 | Status: SHIPPED | OUTPATIENT
Start: 2021-11-16 | End: 2021-11-15 | Stop reason: SDUPTHER

## 2021-09-10 RX ORDER — HYDROCODONE BITARTRATE AND ACETAMINOPHEN 10; 325 MG/1; MG/1
1 TABLET ORAL EVERY 6 HOURS PRN
Qty: 120 TABLET | Refills: 0 | Status: SHIPPED | OUTPATIENT
Start: 2021-10-17 | End: 2021-11-10 | Stop reason: CLARIF

## 2021-09-10 RX ORDER — HYDROCODONE BITARTRATE AND ACETAMINOPHEN 10; 325 MG/1; MG/1
1 TABLET ORAL EVERY 6 HOURS PRN
Qty: 120 TABLET | Refills: 0 | Status: SHIPPED | OUTPATIENT
Start: 2021-09-17 | End: 2021-10-17

## 2021-10-02 ENCOUNTER — HOSPITAL ENCOUNTER (OUTPATIENT)
Dept: RADIOLOGY | Facility: HOSPITAL | Age: 61
Discharge: HOME | End: 2021-10-02
Attending: PHYSICIAN ASSISTANT
Payer: MEDICARE

## 2021-10-02 DIAGNOSIS — M50.30 DDD (DEGENERATIVE DISC DISEASE), CERVICAL: ICD-10-CM

## 2021-10-02 PROCEDURE — 72141 MRI NECK SPINE W/O DYE: CPT | Mod: TC,PO

## 2021-10-02 PROCEDURE — 72141 MRI CERVICAL SPINE WITHOUT CONTRAST: ICD-10-PCS | Mod: 26,,, | Performed by: RADIOLOGY

## 2021-10-02 PROCEDURE — 72141 MRI NECK SPINE W/O DYE: CPT | Mod: 26,,, | Performed by: RADIOLOGY

## 2021-10-26 ENCOUNTER — PATIENT MESSAGE (OUTPATIENT)
Dept: NEUROSURGERY | Facility: CLINIC | Age: 61
End: 2021-10-26
Payer: MEDICARE

## 2021-11-01 ENCOUNTER — OFFICE VISIT (OUTPATIENT)
Dept: NEUROSURGERY | Facility: CLINIC | Age: 61
End: 2021-11-01
Payer: MEDICARE

## 2021-11-01 ENCOUNTER — HOSPITAL ENCOUNTER (OUTPATIENT)
Dept: RADIOLOGY | Facility: HOSPITAL | Age: 61
Discharge: HOME | End: 2021-11-01
Attending: PHYSICIAN ASSISTANT
Payer: MEDICARE

## 2021-11-01 VITALS
BODY MASS INDEX: 35.55 KG/M2 | DIASTOLIC BLOOD PRESSURE: 74 MMHG | HEIGHT: 65 IN | HEART RATE: 78 BPM | WEIGHT: 213.38 LBS | SYSTOLIC BLOOD PRESSURE: 131 MMHG | RESPIRATION RATE: 18 BRPM

## 2021-11-01 DIAGNOSIS — M50.30 DDD (DEGENERATIVE DISC DISEASE), CERVICAL: ICD-10-CM

## 2021-11-01 DIAGNOSIS — M48.02 STENOSIS OF CERVICAL SPINE WITH MYELOPATHY: Primary | ICD-10-CM

## 2021-11-01 DIAGNOSIS — G99.2 STENOSIS OF CERVICAL SPINE WITH MYELOPATHY: Primary | ICD-10-CM

## 2021-11-01 PROCEDURE — 72052 X-RAY EXAM NECK SPINE 6/>VWS: CPT | Mod: TC,FY,PO

## 2021-11-01 PROCEDURE — 99999 PR PBB SHADOW E&M-EST. PATIENT-LVL V: ICD-10-PCS | Mod: PBBFAC,,, | Performed by: NEUROLOGICAL SURGERY

## 2021-11-01 PROCEDURE — 3078F DIAST BP <80 MM HG: CPT | Mod: CPTII,S$GLB,, | Performed by: NEUROLOGICAL SURGERY

## 2021-11-01 PROCEDURE — 3078F PR MOST RECENT DIASTOLIC BLOOD PRESSURE < 80 MM HG: ICD-10-PCS | Mod: CPTII,S$GLB,, | Performed by: NEUROLOGICAL SURGERY

## 2021-11-01 PROCEDURE — 3008F PR BODY MASS INDEX (BMI) DOCUMENTED: ICD-10-PCS | Mod: CPTII,S$GLB,, | Performed by: NEUROLOGICAL SURGERY

## 2021-11-01 PROCEDURE — 3008F BODY MASS INDEX DOCD: CPT | Mod: CPTII,S$GLB,, | Performed by: NEUROLOGICAL SURGERY

## 2021-11-01 PROCEDURE — 3075F PR MOST RECENT SYSTOLIC BLOOD PRESS GE 130-139MM HG: ICD-10-PCS | Mod: CPTII,S$GLB,, | Performed by: NEUROLOGICAL SURGERY

## 2021-11-01 PROCEDURE — 72052 XR CERVICAL SPINE 5 VIEW WITH FLEX AND EXT: ICD-10-PCS | Mod: 26,,, | Performed by: RADIOLOGY

## 2021-11-01 PROCEDURE — 99205 PR OFFICE/OUTPT VISIT, NEW, LEVL V, 60-74 MIN: ICD-10-PCS | Mod: S$GLB,,, | Performed by: NEUROLOGICAL SURGERY

## 2021-11-01 PROCEDURE — 1159F PR MEDICATION LIST DOCUMENTED IN MEDICAL RECORD: ICD-10-PCS | Mod: CPTII,S$GLB,, | Performed by: NEUROLOGICAL SURGERY

## 2021-11-01 PROCEDURE — 99205 OFFICE O/P NEW HI 60 MIN: CPT | Mod: S$GLB,,, | Performed by: NEUROLOGICAL SURGERY

## 2021-11-01 PROCEDURE — 3075F SYST BP GE 130 - 139MM HG: CPT | Mod: CPTII,S$GLB,, | Performed by: NEUROLOGICAL SURGERY

## 2021-11-01 PROCEDURE — 72052 X-RAY EXAM NECK SPINE 6/>VWS: CPT | Mod: 26,,, | Performed by: RADIOLOGY

## 2021-11-01 PROCEDURE — 99999 PR PBB SHADOW E&M-EST. PATIENT-LVL V: CPT | Mod: PBBFAC,,, | Performed by: NEUROLOGICAL SURGERY

## 2021-11-01 PROCEDURE — 1159F MED LIST DOCD IN RCRD: CPT | Mod: CPTII,S$GLB,, | Performed by: NEUROLOGICAL SURGERY

## 2021-11-01 RX ORDER — DULOXETIN HYDROCHLORIDE 60 MG/1
60 CAPSULE, DELAYED RELEASE ORAL DAILY
COMMUNITY

## 2021-11-02 ENCOUNTER — TELEPHONE (OUTPATIENT)
Dept: NEUROSURGERY | Facility: CLINIC | Age: 61
End: 2021-11-02
Payer: MEDICARE

## 2021-11-03 ENCOUNTER — TELEPHONE (OUTPATIENT)
Dept: NEUROSURGERY | Facility: CLINIC | Age: 61
End: 2021-11-03
Payer: MEDICARE

## 2021-11-03 DIAGNOSIS — R79.1 ABNORMAL COAGULATION PROFILE: ICD-10-CM

## 2021-11-03 DIAGNOSIS — G95.9 CERVICAL MYELOPATHY: Primary | ICD-10-CM

## 2021-11-03 RX ORDER — SODIUM CHLORIDE, SODIUM LACTATE, POTASSIUM CHLORIDE, CALCIUM CHLORIDE 600; 310; 30; 20 MG/100ML; MG/100ML; MG/100ML; MG/100ML
INJECTION, SOLUTION INTRAVENOUS CONTINUOUS
Status: CANCELLED | OUTPATIENT
Start: 2021-11-03

## 2021-11-03 RX ORDER — LIDOCAINE HYDROCHLORIDE 10 MG/ML
1 INJECTION, SOLUTION EPIDURAL; INFILTRATION; INTRACAUDAL; PERINEURAL ONCE
Status: CANCELLED | OUTPATIENT
Start: 2021-11-03 | End: 2021-11-03

## 2021-11-09 DIAGNOSIS — G95.9 CERVICAL MYELOPATHY: Primary | ICD-10-CM

## 2021-11-13 ENCOUNTER — PATIENT MESSAGE (OUTPATIENT)
Dept: PAIN MEDICINE | Facility: CLINIC | Age: 61
End: 2021-11-13
Payer: MEDICARE

## 2021-11-15 RX ORDER — HYDROCODONE BITARTRATE AND ACETAMINOPHEN 10; 325 MG/1; MG/1
1 TABLET ORAL EVERY 6 HOURS PRN
Qty: 120 TABLET | Refills: 0 | Status: SHIPPED | OUTPATIENT
Start: 2021-11-16 | End: 2021-12-16

## 2021-11-27 ENCOUNTER — PATIENT MESSAGE (OUTPATIENT)
Dept: NEUROSURGERY | Facility: CLINIC | Age: 61
End: 2021-11-27
Payer: MEDICARE

## 2021-12-01 ENCOUNTER — PATIENT MESSAGE (OUTPATIENT)
Dept: NEUROSURGERY | Facility: CLINIC | Age: 61
End: 2021-12-01
Payer: MEDICARE

## 2021-12-07 ENCOUNTER — TELEPHONE (OUTPATIENT)
Dept: NEUROSURGERY | Facility: CLINIC | Age: 61
End: 2021-12-07
Payer: MEDICARE

## 2021-12-10 ENCOUNTER — PATIENT MESSAGE (OUTPATIENT)
Dept: PAIN MEDICINE | Facility: CLINIC | Age: 61
End: 2021-12-10
Payer: MEDICARE

## 2021-12-10 RX ORDER — HYDROCODONE BITARTRATE AND ACETAMINOPHEN 10; 325 MG/1; MG/1
1 TABLET ORAL EVERY 6 HOURS PRN
Qty: 120 TABLET | Refills: 0 | Status: SHIPPED | OUTPATIENT
Start: 2021-01-14 | End: 2021-02-13

## 2021-12-10 RX ORDER — HYDROCODONE BITARTRATE AND ACETAMINOPHEN 10; 325 MG/1; MG/1
1 TABLET ORAL EVERY 6 HOURS PRN
Qty: 120 TABLET | Refills: 0 | Status: SHIPPED | OUTPATIENT
Start: 2021-12-15 | End: 2022-01-11 | Stop reason: SDUPTHER

## 2021-12-10 RX ORDER — HYDROCODONE BITARTRATE AND ACETAMINOPHEN 10; 325 MG/1; MG/1
1 TABLET ORAL EVERY 6 HOURS PRN
Qty: 120 TABLET | Refills: 0 | Status: SHIPPED | OUTPATIENT
Start: 2021-12-15 | End: 2021-12-10 | Stop reason: SDUPTHER

## 2021-12-21 ENCOUNTER — PATIENT MESSAGE (OUTPATIENT)
Dept: NEUROLOGY | Facility: CLINIC | Age: 61
End: 2021-12-21
Payer: MEDICARE

## 2022-01-11 ENCOUNTER — PATIENT MESSAGE (OUTPATIENT)
Dept: PAIN MEDICINE | Facility: CLINIC | Age: 62
End: 2022-01-11
Payer: MEDICARE

## 2022-01-11 RX ORDER — HYDROCODONE BITARTRATE AND ACETAMINOPHEN 10; 325 MG/1; MG/1
1 TABLET ORAL EVERY 6 HOURS PRN
Qty: 120 TABLET | Refills: 0 | Status: SHIPPED | OUTPATIENT
Start: 2022-01-14 | End: 2022-01-25 | Stop reason: SDUPTHER

## 2022-01-12 ENCOUNTER — PATIENT MESSAGE (OUTPATIENT)
Dept: PAIN MEDICINE | Facility: CLINIC | Age: 62
End: 2022-01-12
Payer: MEDICARE

## 2022-01-12 ENCOUNTER — PATIENT MESSAGE (OUTPATIENT)
Dept: NEUROSURGERY | Facility: CLINIC | Age: 62
End: 2022-01-12
Payer: MEDICARE

## 2022-01-18 ENCOUNTER — TELEPHONE (OUTPATIENT)
Dept: PAIN MEDICINE | Facility: CLINIC | Age: 62
End: 2022-01-18
Payer: MEDICARE

## 2022-01-18 ENCOUNTER — PATIENT MESSAGE (OUTPATIENT)
Dept: PAIN MEDICINE | Facility: CLINIC | Age: 62
End: 2022-01-18
Payer: MEDICARE

## 2022-01-18 NOTE — TELEPHONE ENCOUNTER
----- Message from Shanti Pate sent at 1/18/2022 11:50 AM CST -----  Contact: Patient  Type: Needs Medical Advice    Who Called:  Patient    Best Call Back Number: [phone]    Additional Information: Patient would like to speak to nurse regarding her appt tomorrow.  Please call back.  Thanks.

## 2022-01-19 NOTE — TELEPHONE ENCOUNTER
----- Message from Janine Ny sent at 1/19/2022 10:08 AM CST -----  Regarding: virtual appt  Contact: LISA MEYER [[id number]]  Caller: LISA MEYER [[id number]]  Phone: [phone]   Nature of call: caller requesting to speak to the nurse stated she having trouble connecting to the virtual appt.  Placed call to pod no answer.   Patient disconnected the call during process of reaching out to staff.   Please call upon request.  Thank you!

## 2022-01-19 NOTE — TELEPHONE ENCOUNTER
Pt has been attempting to log into virtual appt since prior to 10 - walked pt through log in process. She successfully logged in. Advised for her not to log out of visit.

## 2022-01-25 ENCOUNTER — OFFICE VISIT (OUTPATIENT)
Dept: PAIN MEDICINE | Facility: CLINIC | Age: 62
End: 2022-01-25
Payer: MEDICARE

## 2022-01-25 VITALS
OXYGEN SATURATION: 98 % | WEIGHT: 216.06 LBS | DIASTOLIC BLOOD PRESSURE: 68 MMHG | RESPIRATION RATE: 20 BRPM | TEMPERATURE: 96 F | HEART RATE: 72 BPM | BODY MASS INDEX: 35.41 KG/M2 | SYSTOLIC BLOOD PRESSURE: 145 MMHG

## 2022-01-25 DIAGNOSIS — M54.12 CERVICAL RADICULOPATHY: Primary | ICD-10-CM

## 2022-01-25 DIAGNOSIS — Z79.891 OPIOID CONTRACT EXISTS: ICD-10-CM

## 2022-01-25 DIAGNOSIS — G35 MULTIPLE SCLEROSIS: ICD-10-CM

## 2022-01-25 DIAGNOSIS — M48.02 CERVICAL STENOSIS OF SPINAL CANAL: ICD-10-CM

## 2022-01-25 PROCEDURE — 3077F SYST BP >= 140 MM HG: CPT | Mod: CPTII,S$GLB,, | Performed by: ANESTHESIOLOGY

## 2022-01-25 PROCEDURE — 3008F PR BODY MASS INDEX (BMI) DOCUMENTED: ICD-10-PCS | Mod: CPTII,S$GLB,, | Performed by: ANESTHESIOLOGY

## 2022-01-25 PROCEDURE — 3078F PR MOST RECENT DIASTOLIC BLOOD PRESSURE < 80 MM HG: ICD-10-PCS | Mod: CPTII,S$GLB,, | Performed by: ANESTHESIOLOGY

## 2022-01-25 PROCEDURE — 99214 OFFICE O/P EST MOD 30 MIN: CPT | Mod: S$GLB,,, | Performed by: ANESTHESIOLOGY

## 2022-01-25 PROCEDURE — 3077F PR MOST RECENT SYSTOLIC BLOOD PRESSURE >= 140 MM HG: ICD-10-PCS | Mod: CPTII,S$GLB,, | Performed by: ANESTHESIOLOGY

## 2022-01-25 PROCEDURE — 99214 PR OFFICE/OUTPT VISIT, EST, LEVL IV, 30-39 MIN: ICD-10-PCS | Mod: S$GLB,,, | Performed by: ANESTHESIOLOGY

## 2022-01-25 PROCEDURE — 99999 PR PBB SHADOW E&M-EST. PATIENT-LVL IV: ICD-10-PCS | Mod: PBBFAC,,, | Performed by: ANESTHESIOLOGY

## 2022-01-25 PROCEDURE — 99999 PR PBB SHADOW E&M-EST. PATIENT-LVL IV: CPT | Mod: PBBFAC,,, | Performed by: ANESTHESIOLOGY

## 2022-01-25 PROCEDURE — 3078F DIAST BP <80 MM HG: CPT | Mod: CPTII,S$GLB,, | Performed by: ANESTHESIOLOGY

## 2022-01-25 PROCEDURE — 80307 DRUG TEST PRSMV CHEM ANLYZR: CPT | Performed by: ANESTHESIOLOGY

## 2022-01-25 PROCEDURE — 1159F MED LIST DOCD IN RCRD: CPT | Mod: CPTII,S$GLB,, | Performed by: ANESTHESIOLOGY

## 2022-01-25 PROCEDURE — 3008F BODY MASS INDEX DOCD: CPT | Mod: CPTII,S$GLB,, | Performed by: ANESTHESIOLOGY

## 2022-01-25 PROCEDURE — 1159F PR MEDICATION LIST DOCUMENTED IN MEDICAL RECORD: ICD-10-PCS | Mod: CPTII,S$GLB,, | Performed by: ANESTHESIOLOGY

## 2022-01-25 RX ORDER — HYDROCODONE BITARTRATE AND ACETAMINOPHEN 10; 325 MG/1; MG/1
1 TABLET ORAL EVERY 6 HOURS PRN
Qty: 120 TABLET | Refills: 0 | Status: SHIPPED | OUTPATIENT
Start: 2022-03-15 | End: 2022-04-11 | Stop reason: SDUPTHER

## 2022-01-25 RX ORDER — HYDROCODONE BITARTRATE AND ACETAMINOPHEN 10; 325 MG/1; MG/1
1 TABLET ORAL EVERY 6 HOURS PRN
Qty: 120 TABLET | Refills: 0 | Status: SHIPPED | OUTPATIENT
Start: 2022-03-15 | End: 2022-04-14

## 2022-01-25 RX ORDER — PREGABALIN 75 MG/1
CAPSULE ORAL
Qty: 90 CAPSULE | Refills: 2 | Status: SHIPPED | OUTPATIENT
Start: 2022-01-25 | End: 2022-05-02

## 2022-01-25 RX ORDER — HYDROCODONE BITARTRATE AND ACETAMINOPHEN 10; 325 MG/1; MG/1
1 TABLET ORAL EVERY 6 HOURS PRN
Qty: 120 TABLET | Refills: 0 | Status: SHIPPED | OUTPATIENT
Start: 2022-02-13 | End: 2022-03-15

## 2022-01-25 NOTE — PROGRESS NOTES
This note was completed with dictation software and grammatical errors may exist.      CC: Neck and bilateral arm pain, back pain, knee pain     HPI: The patient is a 61-year-old woman with a history of hypertension, diabetes, multiple sclerosis, lumbar postlaminectomy syndrome who presents in self-referral for continued low back pain and bilateral leg pain, neck pain.  She returns in follow-up today for neck pain, bilateral arm pain.  She was supposed to have C5/6 ACDF on 11/18/2021 with Dr. Coyle but was found to have hyponatremia and so the procedure was post bone.  She has seen her primary care doctor and they have discussed this, she is not sure when she is going to have this surgery now.  Nonetheless, she reports having relief of her pain with hydrocodone.  Her biggest concern right now is that her  is very sick with cancer, losing weight.          Pain intervention history: She has been followed by one of our previous [facility] pain management physicians, Dr. Chapa in [place] until recently.  She had undergone fusion surgery in 1983 and again surgery in 1993 for her lumbar spine.  She had undergone numerous lumbar spine injections in the past with good relief.  She has tried gabapentin with only minimal relief but states that Lyrica works best.  Sounds like she had a spinal cord stimulator trial in the past that did not help.  She has been taking hydrocodone up to 4 times a day for several years.  She is status post caudal epidural steroid injection on 8/6/14 with 60% relief.  She is status post caudal epidural steroid injection on 5/13/15 with 0% relief.  She is status post caudal epidural steroid injection on 10/2/15 with 0% relief.  She is status post C7-T1 cervical interlaminar epidural steroid injection on 9/7/16 with 100% relief of her bilateral arm pain and mild relief of her neck pain.  She is status post C7-T1 cervical interlaminar epidural steroid injection on 2/21/17 with at least  50% relief.  She is status post bilateral L1 transforaminal epidural steroid injections on 3/20/17 with 75% relief. She is status post C7-T1 cervical interlaminar epidural steroid injection on 12/22/17 and again on 2/9/18 with 90% relief of her neck and arm pain.  She is status post bilateral L1/2 transforaminal epidural steroid injections on 08/08/2018 with almost 100% relief of her low back pain.She is status post bilateral L1/2 transforaminal epidural steroid injections on 12/08/2020 with about 60% relief. She is status post C7-T1 interlaminar epidural steroid injection on 08/03/2021 with 0% relief.        Spine surgeries: She had undergone fusion surgery in 1983 and again surgery in 1993 for her lumbar spine.     Antineuropathics:  Lyrica 75 in the morning and 150 at night  NSAIDs:  Physical therapy:  Antidepressants:  Cymbalta 60 daily, venlafaxine 225  Muscle relaxers:  Opioids:  Antiplatelets/Anticoagulants:              ROS:She reports urinary frequency, joint stiffness, joint swelling, back pain, memory loss, anxiety, depression, difficulty sleeping and loss of balance.  Balance of review of systems is negative.    Medical, surgical, family and social history reviewed elsewhere in record.    Medications/Allergies: See med card    Vitals:    01/25/22 0823   BP: (!) 145/68   Pulse: 72   Resp: 20   Temp: 96.1 °F (35.6 °C)   TempSrc: Oral   SpO2: 98%   Weight: 98 kg (216 lb 0.8 oz)   PainSc:   6   PainLoc: Neck         Physical exam:  Gen: A and O x3, pleasant, well-groomed  Skin: No rashes or obvious lesions  HEENT: PERRLA  CVS: Regular rate and rhythm, normal S1 and S2, no murmurs.    Resp: Clear to auscultation bilaterally, no wheezes or rales.  Abdomen: Soft, NT/ND, normal bowel sounds present.  Musculoskeletal:  In a wheelchair      Neuro:  Upper extremities: 5/5 strength bilaterally, except for 4/5  strength  Lower extremities: 5/5 strength bilaterally, except for 4/5 left foot dorsiflexion  Reflexes:  Brachioradialis 0+, Bicep 1+, Tricep 0+. Patellar 0+, Achilles 0+ bilaterally.  Sensory: Intact and symmetrical to light touch and pinprick in C2-T1 dermatomes bilaterally. Intact and symmetrical to light touch and pinprick in L2-S1 dermatomes bilaterally.      Cervical spine:   Range of motion mildly reduced with flexion, extension and lateral rotation with increased bilateral neck pain during each maneuver.  Spurling's test positive for bilateral neck and shoulder pain  Myofascial exam: Mild tenderness palpation to the cervical paraspinous muscles.     Lumbar spine:  Lumbar spine: ROM is deferred.  Jeff's test is deferred.   Supine straight leg raise is deferred.  Internal and external rotation of the hip is deferred.   Myofascial exam: Mild tenderness to palpation across lumbar paraspinous muscles.    Imaging:  MRI C-spine 10/10/17  Comparison MRI cervical spine 08/16/2016.  A minimal reversal of the mid cervical curvature again noted with anatomic alignment.  Degenerative changes of the discs including loss of height and desiccation also again noted at C4-5 and C5-6.  C3-4, moderate right facet arthropathy.  C4-5, bulging of the disc again noted with effacement of the adjacent ventral subarachnoid space.  C5-6, bulging of the disc again noted with superimposed small more focal right paracentral protrusion of the disc with complete effacement of ventral subarachnoid space and flattening of the ventral cord more so on the right, unchanged.  Overall mild degree of canal stenosis results.  Bilateral uncinate hypertrophy is present with moderate to severe bilateral foraminal stenosis.  C6-7, mild annular bulging slightly asymmetric to the left posteriorly.  C7-T1, small posterior midline/right prominent midline protrusion, unchanged.  Mild annular bulging also suspected T1-2.  The cervical cord maintains normal signal throughout.  No abnormal intra-dural enhancement is seen.    MRI lumbar spine 2/1/17  There are  postoperative changes of posterior instrumented fusion at L2-S1.  There is a grade I anterolisthesis of L5 on S1.  The lumbar vertebral bodies show normal height without evidence of acute compression fracture or pathologic marrow replacement process.  There is a levoconvex curvature of the lumbar spine. There is degenerative desiccation, disc space narrowing, and degenerative endplate change at T11-T12, T12-L1, L1-L2, and L5-S1.  There is near complete fusion of the L2, L3, L4, and L5 vertebral bodies.  There are probable postoperative changes of posterior decompression at L2-S1. The conus medullaris terminates at L1. The visualized retroperitoneal/abdominal soft tissue  structures are unremarkable.  T11-T12: There is a broad disc bulge which flattens the ventral aspect of the conus.  There is ligamentum flavum thickening and facet arthropathy.  There is moderate bilateral neuroforaminal stenosis present.  There is mild-moderate central canal stenosis.  T12-L1: There is a broad disc bulge with a superimposed ascending right paracentral disc extrusion which extends approximately 10 mm above the disc plane.  There is mild-moderate central canal stenosis present.  There is mild right neuroforaminal stenosis.  L1-L2: There is a broad disc bulge throughout the period there is ligamentum flavum thickening.  The central canal is obscured.  There is a suggestion of a possible right paracentral disc protrusion.  There is mild-moderate overall central canal stenosis.  The neural foramina are not assessed secondary to metal artifact.  L2-L3: No central canal or neuroforaminal stenosis. No disc protrusion or extrusion.  L3-L4: No central canal or neuroforaminal stenosis. No disc protrusion or extrusion.  L4-L5: The left neural foramen is not optimally evaluated due to metal artifact.  No central canal stenosis or right neuroforaminal stenosis.  L5-S1: There is a grade I anterolisthesis of L5 on S1 with associated uncovering of  the intervertebral disc.  No definite central canal stenosis or neuroforaminal stenosis.    11/27/20 MRI L spine  T12-L1: Mild disc osteophyte complex.  Prominent epidural fat.  Mild right and minimal left facet hypertrophy.  Compression of the thecal sac with minimal fluid surrounding the conus just above the T12 level.  This has progressed from prior study on 02/01/2017.  Moderate right and mild left foraminal stenosis.   L1-L2: Retrolisthesis.  Prominent epidural fat.  Spinal canal is somewhat obscured by artifact from hardware.  There appears to be moderate right and at least mild left narrowing of the lateral recesses with mild spinal canal stenosis.  This is progressed from prior study.  Foramina are partially obscured by artifact from hardware but there appears to be mild bilateral foraminal stenosis.   L2-L3: Fused.  No significant spinal canal stenosis.  Likely minimal bilateral foraminal stenosis.   L3-L4: Fused.  No significant spinal canal or foraminal stenosis.   L4-L5: Fused.  No significant spinal canal stenosis.  Foramina are partially obscured.  There appears to be mild-moderate right foraminal stenosis.   L5-S1: Fused.  Anterolisthesis.  Spinal canal is partially obscured but there appears to be no significant spinal canal stenosis.  Likely mild bilateral foraminal stenosis.    5/25/20 MRI C spine  There is multilevel cervical spondylosis.  Straightening of the cervical spine.  Disc space narrowing greatest at the C5-C6 level.  No evidence of congenital spinal stenosis.  No evidence of abnormal signal change involving the visualized portion of the spinal cord.   At the C2-C3 and C3-C4 levels there is no evidence of a focal disc abnormality.  There are facet degenerative changes on the right at C3-C4.  No significant spinal canal or foraminal encroachment.   At the C4-C5 level there is a posterior disc bulge/osteophyte with mild effacement along the anterior margin of the subarachnoid space.  Facet  degenerative changes greater on the right than the left.  Mild right greater than left foraminal encroachment.   At the C5-C6 level there is a posterior and right paracentral disc-osteophyte with effacement along the anterior subarachnoid space and spinal cord.  There are degenerative changes of the facets bilaterally resulting in a moderate degree of acquired spinal stenosis and foraminal encroachment.   At the C6-C7 level there is a minimal disc bulge/osteophyte without significant spinal canal or foraminal encroachment.   At the C7-T1 level there is a central disc-osteophyte mildly prominent to the right of midline with effacement along the anterior subarachnoid space.  No foraminal encroachment.       Assessment:  The patient is a 61-year-old woman with a history of hypertension, diabetes, multiple sclerosis, lumbar postlaminectomy syndrome who presents in self-referral for continued low back pain and bilateral leg pain, neck pain.   1. Cervical radiculopathy     2. Cervical stenosis of spinal canal     3. Multiple sclerosis     4. Opioid contract exists  POCT Urine Drug Screen Pain Management    Pain Clinic Drug Screen                                                                                                                                                                                               Plan:  1. She is going to have to reschedule her cervical surgery, needs to have hyponatremia fully worked up by primary care.  She is currently on hydrochlorothiazide but with the addition of spironolactone so I would expect less hyponatremia with this.  However this could be a possibility in addition to Cymbalta and venlafaxine.  2. I have refilled her hydrocodone and Lyrica, she will continue taking these, currently refilled for the next 3 months.  I have collected a urine drug screen today.

## 2022-02-03 LAB
6MAM UR QL: NOT DETECTED
7AMINOCLONAZEPAM UR QL: NOT DETECTED
A-OH ALPRAZ UR QL: NOT DETECTED
ALPHA-OH-MIDAZOLAM: NOT DETECTED
ALPRAZ UR QL: NOT DETECTED
AMPHET UR QL SCN: NOT DETECTED
ANNOTATION COMMENT IMP: NORMAL
ANNOTATION COMMENT IMP: NORMAL
BARBITURATES UR QL: NOT DETECTED
BUPRENORPHINE UR QL: NOT DETECTED
BZE UR QL: NOT DETECTED
CARBOXYTHC UR QL: NOT DETECTED
CARISOPRODOL UR QL: NOT DETECTED
CLONAZEPAM UR QL: NOT DETECTED
CODEINE UR QL: NOT DETECTED
CREAT UR-MCNC: 41.5 MG/DL (ref 20–400)
DIAZEPAM UR QL: NOT DETECTED
ETHYL GLUCURONIDE UR QL: NOT DETECTED
FENTANYL UR QL: NOT DETECTED
GABAPENTIN: NOT DETECTED
HYDROCODONE UR QL: PRESENT
HYDROMORPHONE UR QL: PRESENT
LORAZEPAM UR QL: NOT DETECTED
MDA UR QL: NOT DETECTED
MDEA UR QL: NOT DETECTED
MDMA UR QL: NOT DETECTED
ME-PHENIDATE UR QL: NOT DETECTED
METHADONE UR QL: NOT DETECTED
METHAMPHET UR QL: NOT DETECTED
MIDAZOLAM UR QL SCN: NOT DETECTED
MORPHINE UR QL: NOT DETECTED
NALOXONE: NOT DETECTED
NORBUPRENORPHINE UR QL CFM: NOT DETECTED
NORDIAZEPAM UR QL: NOT DETECTED
NORFENTANYL UR QL: NOT DETECTED
NORHYDROCODONE UR QL CFM: PRESENT
NORMEPERIDINE UR QL CFM: NOT DETECTED
NOROXYCODONE UR QL CFM: NOT DETECTED
NOROXYMORPHONE UR QL SCN: NOT DETECTED
OXAZEPAM UR QL: NOT DETECTED
OXYCODONE UR QL: NOT DETECTED
OXYMORPHONE UR QL: NOT DETECTED
PATHOLOGY STUDY: NORMAL
PCP UR QL: NOT DETECTED
PHENTERMINE UR QL: NOT DETECTED
PREGABALIN: PRESENT
SERVICE CMNT-IMP: NORMAL
TAPENTADOL UR QL SCN: NOT DETECTED
TAPENTADOL UR QL SCN: NOT DETECTED
TEMAZEPAM UR QL: NOT DETECTED
TRAMADOL UR QL: NOT DETECTED
ZOLPIDEM METABOLITE: NOT DETECTED
ZOLPIDEM UR QL: NOT DETECTED

## 2022-02-28 ENCOUNTER — PATIENT MESSAGE (OUTPATIENT)
Dept: PSYCHIATRY | Facility: CLINIC | Age: 62
End: 2022-02-28
Payer: MEDICARE

## 2022-04-10 ENCOUNTER — PATIENT MESSAGE (OUTPATIENT)
Dept: PAIN MEDICINE | Facility: CLINIC | Age: 62
End: 2022-04-10
Payer: MEDICARE

## 2022-04-11 ENCOUNTER — PATIENT MESSAGE (OUTPATIENT)
Dept: PAIN MEDICINE | Facility: CLINIC | Age: 62
End: 2022-04-11
Payer: MEDICARE

## 2022-04-11 RX ORDER — HYDROCODONE BITARTRATE AND ACETAMINOPHEN 10; 325 MG/1; MG/1
1 TABLET ORAL EVERY 6 HOURS PRN
Qty: 120 TABLET | Refills: 0 | Status: SHIPPED | OUTPATIENT
Start: 2022-04-14 | End: 2022-04-26 | Stop reason: SDUPTHER

## 2022-04-11 NOTE — TELEPHONE ENCOUNTER
Spoke to patient and explained the prescription dates. She will run out a day early and  her medication on 4/14.

## 2022-04-21 ENCOUNTER — TELEPHONE (OUTPATIENT)
Dept: NEUROSURGERY | Facility: CLINIC | Age: 62
End: 2022-04-21
Payer: MEDICARE

## 2022-04-21 NOTE — TELEPHONE ENCOUNTER
----- Message from Alejandra Coleman LPN sent at 4/20/2022  4:25 PM CDT -----  Regarding: FW: questions about surgery    ----- Message -----  From: Jacqueline Tomlinson PA-C  Sent: 4/20/2022   2:55 PM CDT  To: Alejandra Coleman LPN  Subject: RE: questions about surgery                      Please schedule patient to see Dr. Coyle in clinic next week. She was suppose to follow-up months ago to reassess since we were unable to proceed with surgery until her sodium improved. Please have all records from what was done with sodium correction etc. for us to review when seeing her. In regards to the feeling of passing out when laying down please have her contact her PCP.     ----- Message -----  From: Alejandra Coleman LPN  Sent: 4/20/2022   2:06 PM CDT  To: Jacqueline Tomlinson PA-C  Subject: FW: questions about surgery                      I spoke with patient. She states she has followed up with endocrinology and her sodium has remained low. She is currently being weaned off of Effexor to see if that is the cause. I am trying to obtain those records. She is still having neck pain but has noticed in the last week she has had bilateral shoulder pain and she is unable to even lay down on her bed. She wanted to see if this is possibley from her neck.? Also notes when she lays down she feels like she is going to pass out. She would like a call to discuss. She was also wanting to come in this week to the clinic.   ----- Message -----  From: Gregory Mendoza  Sent: 4/20/2022   8:19 AM CDT  To: Jonna CRISOSTOM[facility] Staff  Subject: questions about surgery                          Type: Needs Medical Advice    Who Called:  Justin    Willie Call Back Number: [phone]     Additional Information: would like to discuss surgery. Would like to speak with xiomara vega she is familiar with case.

## 2022-04-26 ENCOUNTER — OFFICE VISIT (OUTPATIENT)
Dept: PAIN MEDICINE | Facility: CLINIC | Age: 62
End: 2022-04-26
Payer: MEDICARE

## 2022-04-26 DIAGNOSIS — M54.16 LUMBAR RADICULOPATHY: ICD-10-CM

## 2022-04-26 DIAGNOSIS — M54.12 CERVICAL RADICULOPATHY: Primary | ICD-10-CM

## 2022-04-26 PROCEDURE — 99213 PR OFFICE/OUTPT VISIT, EST, LEVL III, 20-29 MIN: ICD-10-PCS | Mod: 95,,, | Performed by: ANESTHESIOLOGY

## 2022-04-26 PROCEDURE — 99213 OFFICE O/P EST LOW 20 MIN: CPT | Mod: 95,,, | Performed by: ANESTHESIOLOGY

## 2022-04-26 RX ORDER — HYDROCODONE BITARTRATE AND ACETAMINOPHEN 10; 325 MG/1; MG/1
1 TABLET ORAL EVERY 6 HOURS PRN
Qty: 120 TABLET | Refills: 0 | Status: SHIPPED | OUTPATIENT
Start: 2022-05-14 | End: 2022-06-13

## 2022-04-26 RX ORDER — HYDROCODONE BITARTRATE AND ACETAMINOPHEN 10; 325 MG/1; MG/1
1 TABLET ORAL EVERY 6 HOURS PRN
Qty: 120 TABLET | Refills: 0 | Status: SHIPPED | OUTPATIENT
Start: 2022-06-13 | End: 2022-07-13

## 2022-04-26 RX ORDER — METHOCARBAMOL 750 MG/1
750 TABLET, FILM COATED ORAL 3 TIMES DAILY PRN
Qty: 40 TABLET | Refills: 2 | Status: SHIPPED | OUTPATIENT
Start: 2022-04-26 | End: 2022-07-15

## 2022-04-26 RX ORDER — HYDROCODONE BITARTRATE AND ACETAMINOPHEN 10; 325 MG/1; MG/1
1 TABLET ORAL EVERY 6 HOURS PRN
Qty: 120 TABLET | Refills: 0 | Status: SHIPPED | OUTPATIENT
Start: 2022-07-13 | End: 2022-08-02 | Stop reason: SDUPTHER

## 2022-04-26 NOTE — PROGRESS NOTES
This note was completed with dictation software and grammatical errors may exist.    The patient location is: [place], [place]  The chief complaint leading to consultation is: Neck and arm pain    Visit type: audiovisual    Face to Face time with patient: 12 minutes  21 minutes of total time spent on the encounter, which includes face to face time and non-face to face time preparing to see the patient (eg, review of tests), Obtaining and/or reviewing separately obtained history, Documenting clinical information in the electronic or other health record, Independently interpreting results (not separately reported) and communicating results to the patient/family/caregiver, or Care coordination (not separately reported).         Each patient to whom he or she provides medical services by telemedicine is:  (1) informed of the relationship between the physician and patient and the respective role of any other health care provider with respect to management of the patient; and (2) notified that he or she may decline to receive medical services by telemedicine and may withdraw from such care at any time.    Notes:         CC: Neck and bilateral arm pain, back pain, knee pain     HPI: The patient is a 62-year-old woman with a history of hypertension, diabetes, multiple sclerosis, lumbar postlaminectomy syndrome who presents in self-referral for continued low back pain and bilateral leg pain, neck pain.  She is still currently undergoing workup for hyponatremia with the endocrinologist in [facility].  She has been told that if they do not find a cause, this may relate to a malignancy.  As far as her pain, continues to be severe in the neck and bilateral shoulders, difficulty raising her arms.  She states that she continues to have back pain as well.  Continues to take hydrocodone 4 times daily as needed with some relief.  She also takes Lyrica, Cymbalta.  She is asking for a muscle relaxant.        Pain intervention history: She has  been followed by one of our previous [facility] pain management physicians, Dr. Chapa in [place] until recently.  She had undergone fusion surgery in 1983 and again surgery in 1993 for her lumbar spine.  She had undergone numerous lumbar spine injections in the past with good relief.  She has tried gabapentin with only minimal relief but states that Lyrica works best.  Sounds like she had a spinal cord stimulator trial in the past that did not help.  She has been taking hydrocodone up to 4 times a day for several years.  She is status post caudal epidural steroid injection on 8/6/14 with 60% relief.  She is status post caudal epidural steroid injection on 5/13/15 with 0% relief.  She is status post caudal epidural steroid injection on 10/2/15 with 0% relief.  She is status post C7-T1 cervical interlaminar epidural steroid injection on 9/7/16 with 100% relief of her bilateral arm pain and mild relief of her neck pain.  She is status post C7-T1 cervical interlaminar epidural steroid injection on 2/21/17 with at least 50% relief.  She is status post bilateral L1 transforaminal epidural steroid injections on 3/20/17 with 75% relief. She is status post C7-T1 cervical interlaminar epidural steroid injection on 12/22/17 and again on 2/9/18 with 90% relief of her neck and arm pain.  She is status post bilateral L1/2 transforaminal epidural steroid injections on 08/08/2018 with almost 100% relief of her low back pain.She is status post bilateral L1/2 transforaminal epidural steroid injections on 12/08/2020 with about 60% relief. She is status post C7-T1 interlaminar epidural steroid injection on 08/03/2021 with 0% relief.        Spine surgeries: She had undergone fusion surgery in 1983 and again surgery in 1993 for her lumbar spine.     Antineuropathics:  Lyrica 75 in the morning and 150 at night  NSAIDs:  Physical therapy:  Antidepressants:  Cymbalta 60 daily, venlafaxine 225  Muscle  relaxers:  Opioids:  Antiplatelets/Anticoagulants:              ROS:She reports urinary frequency, joint stiffness, joint swelling, back pain, memory loss, anxiety, depression, difficulty sleeping and loss of balance.  Balance of review of systems is negative.    Medical, surgical, family and social history reviewed elsewhere in record.    Medications/Allergies: See med card    There were no vitals filed for this visit.      Physical exam:  Gen: A and O x3, pleasant, well-groomed      Imaging:  MRI C-spine 10/10/17  Comparison MRI cervical spine 08/16/2016.  A minimal reversal of the mid cervical curvature again noted with anatomic alignment.  Degenerative changes of the discs including loss of height and desiccation also again noted at C4-5 and C5-6.  C3-4, moderate right facet arthropathy.  C4-5, bulging of the disc again noted with effacement of the adjacent ventral subarachnoid space.  C5-6, bulging of the disc again noted with superimposed small more focal right paracentral protrusion of the disc with complete effacement of ventral subarachnoid space and flattening of the ventral cord more so on the right, unchanged.  Overall mild degree of canal stenosis results.  Bilateral uncinate hypertrophy is present with moderate to severe bilateral foraminal stenosis.  C6-7, mild annular bulging slightly asymmetric to the left posteriorly.  C7-T1, small posterior midline/right prominent midline protrusion, unchanged.  Mild annular bulging also suspected T1-2.  The cervical cord maintains normal signal throughout.  No abnormal intra-dural enhancement is seen.    MRI lumbar spine 2/1/17  There are postoperative changes of posterior instrumented fusion at L2-S1.  There is a grade I anterolisthesis of L5 on S1.  The lumbar vertebral bodies show normal height without evidence of acute compression fracture or pathologic marrow replacement process.  There is a levoconvex curvature of the lumbar spine. There is degenerative  desiccation, disc space narrowing, and degenerative endplate change at T11-T12, T12-L1, L1-L2, and L5-S1.  There is near complete fusion of the L2, L3, L4, and L5 vertebral bodies.  There are probable postoperative changes of posterior decompression at L2-S1. The conus medullaris terminates at L1. The visualized retroperitoneal/abdominal soft tissue  structures are unremarkable.  T11-T12: There is a broad disc bulge which flattens the ventral aspect of the conus.  There is ligamentum flavum thickening and facet arthropathy.  There is moderate bilateral neuroforaminal stenosis present.  There is mild-moderate central canal stenosis.  T12-L1: There is a broad disc bulge with a superimposed ascending right paracentral disc extrusion which extends approximately 10 mm above the disc plane.  There is mild-moderate central canal stenosis present.  There is mild right neuroforaminal stenosis.  L1-L2: There is a broad disc bulge throughout the period there is ligamentum flavum thickening.  The central canal is obscured.  There is a suggestion of a possible right paracentral disc protrusion.  There is mild-moderate overall central canal stenosis.  The neural foramina are not assessed secondary to metal artifact.  L2-L3: No central canal or neuroforaminal stenosis. No disc protrusion or extrusion.  L3-L4: No central canal or neuroforaminal stenosis. No disc protrusion or extrusion.  L4-L5: The left neural foramen is not optimally evaluated due to metal artifact.  No central canal stenosis or right neuroforaminal stenosis.  L5-S1: There is a grade I anterolisthesis of L5 on S1 with associated uncovering of the intervertebral disc.  No definite central canal stenosis or neuroforaminal stenosis.    11/27/20 MRI L spine  T12-L1: Mild disc osteophyte complex.  Prominent epidural fat.  Mild right and minimal left facet hypertrophy.  Compression of the thecal sac with minimal fluid surrounding the conus just above the T12 level.   This has progressed from prior study on 02/01/2017.  Moderate right and mild left foraminal stenosis.   L1-L2: Retrolisthesis.  Prominent epidural fat.  Spinal canal is somewhat obscured by artifact from hardware.  There appears to be moderate right and at least mild left narrowing of the lateral recesses with mild spinal canal stenosis.  This is progressed from prior study.  Foramina are partially obscured by artifact from hardware but there appears to be mild bilateral foraminal stenosis.   L2-L3: Fused.  No significant spinal canal stenosis.  Likely minimal bilateral foraminal stenosis.   L3-L4: Fused.  No significant spinal canal or foraminal stenosis.   L4-L5: Fused.  No significant spinal canal stenosis.  Foramina are partially obscured.  There appears to be mild-moderate right foraminal stenosis.   L5-S1: Fused.  Anterolisthesis.  Spinal canal is partially obscured but there appears to be no significant spinal canal stenosis.  Likely mild bilateral foraminal stenosis.    5/25/20 MRI C spine  There is multilevel cervical spondylosis.  Straightening of the cervical spine.  Disc space narrowing greatest at the C5-C6 level.  No evidence of congenital spinal stenosis.  No evidence of abnormal signal change involving the visualized portion of the spinal cord.   At the C2-C3 and C3-C4 levels there is no evidence of a focal disc abnormality.  There are facet degenerative changes on the right at C3-C4.  No significant spinal canal or foraminal encroachment.   At the C4-C5 level there is a posterior disc bulge/osteophyte with mild effacement along the anterior margin of the subarachnoid space.  Facet degenerative changes greater on the right than the left.  Mild right greater than left foraminal encroachment.   At the C5-C6 level there is a posterior and right paracentral disc-osteophyte with effacement along the anterior subarachnoid space and spinal cord.  There are degenerative changes of the facets bilaterally  resulting in a moderate degree of acquired spinal stenosis and foraminal encroachment.   At the C6-C7 level there is a minimal disc bulge/osteophyte without significant spinal canal or foraminal encroachment.   At the C7-T1 level there is a central disc-osteophyte mildly prominent to the right of midline with effacement along the anterior subarachnoid space.  No foraminal encroachment.       Assessment:  The patient is a 62-year-old woman with a history of hypertension, diabetes, multiple sclerosis, lumbar postlaminectomy syndrome who presents in self-referral for continued low back pain and bilateral leg pain, neck pain.   1. Cervical radiculopathy     2. Lumbar radiculopathy                                                                                                                                                                                                 Plan:  1. We discussed that she is having flare up, I think it may be helpful to tried Medrol Dosepak but I will need to clear this with her endocrinologist, we will contact him.  2. Otherwise there is not much more we have to offer right now, she is still undergoing workup for hyponatremia and she will let us know the outcome of this so that she can schedule her cervical surgery.  3. I have refilled her hydrocodone, Lyrica for the next several months.  I have sent a prescription for Robaxin 750 to use several times daily as needed.  I will have her follow-up 3 months or sooner as needed.          The total time spent for evaluation and management today including reviewing separately obtained history, performing a medically appropriate exam and evaluation, documenting clinical information in the health record, independently interpreting results and communicating them to the patient/family/caregiver, and ordering medications/tests/procedures was between 20-29 minutes.

## 2022-06-24 ENCOUNTER — PATIENT MESSAGE (OUTPATIENT)
Dept: PSYCHIATRY | Facility: CLINIC | Age: 62
End: 2022-06-24
Payer: MEDICARE

## 2022-07-14 ENCOUNTER — LAB VISIT (OUTPATIENT)
Dept: LAB | Facility: HOSPITAL | Age: 62
End: 2022-07-14
Attending: INTERNAL MEDICINE
Payer: MEDICARE

## 2022-07-14 DIAGNOSIS — Z01.818 OTHER SPECIFIED PRE-OPERATIVE EXAMINATION: ICD-10-CM

## 2022-07-14 DIAGNOSIS — E11.8 DIABETIC COMPLICATION: Primary | ICD-10-CM

## 2022-07-14 LAB
ANION GAP SERPL CALC-SCNC: 6 MMOL/L (ref 8–16)
BACTERIA #/AREA URNS HPF: NEGATIVE /HPF
BASOPHILS # BLD AUTO: 0.05 K/UL (ref 0–0.2)
BASOPHILS NFR BLD: 0.7 % (ref 0–1.9)
BILIRUB UR QL STRIP: NEGATIVE
BUN SERPL-MCNC: 11 MG/DL (ref 8–23)
CALCIUM SERPL-MCNC: 8.5 MG/DL (ref 8.7–10.5)
CHLORIDE SERPL-SCNC: 98 MMOL/L (ref 95–110)
CLARITY UR: CLEAR
CO2 SERPL-SCNC: 28 MMOL/L (ref 23–29)
COLOR UR: YELLOW
CREAT SERPL-MCNC: 1 MG/DL (ref 0.5–1.4)
DIFFERENTIAL METHOD: ABNORMAL
EOSINOPHIL # BLD AUTO: 0.3 K/UL (ref 0–0.5)
EOSINOPHIL NFR BLD: 3.3 % (ref 0–8)
ERYTHROCYTE [DISTWIDTH] IN BLOOD BY AUTOMATED COUNT: 14.2 % (ref 11.5–14.5)
EST. GFR  (AFRICAN AMERICAN): >60 ML/MIN/1.73 M^2
EST. GFR  (NON AFRICAN AMERICAN): >60 ML/MIN/1.73 M^2
ESTIMATED AVG GLUCOSE: 143 MG/DL (ref 68–131)
GLUCOSE SERPL-MCNC: 77 MG/DL (ref 70–110)
GLUCOSE UR QL STRIP: NEGATIVE
HBA1C MFR BLD: 6.6 % (ref 4–5.6)
HCT VFR BLD AUTO: 38.4 % (ref 37–48.5)
HGB BLD-MCNC: 13.1 G/DL (ref 12–16)
HGB UR QL STRIP: NEGATIVE
HYALINE CASTS #/AREA URNS LPF: 0.4 /LPF
IMM GRANULOCYTES # BLD AUTO: 0.02 K/UL (ref 0–0.04)
IMM GRANULOCYTES NFR BLD AUTO: 0.3 % (ref 0–0.5)
KETONES UR QL STRIP: NEGATIVE
LEUKOCYTE ESTERASE UR QL STRIP: ABNORMAL
LYMPHOCYTES # BLD AUTO: 2.5 K/UL (ref 1–4.8)
LYMPHOCYTES NFR BLD: 32.7 % (ref 18–48)
MCH RBC QN AUTO: 30.3 PG (ref 27–31)
MCHC RBC AUTO-ENTMCNC: 34.1 G/DL (ref 32–36)
MCV RBC AUTO: 89 FL (ref 82–98)
MICROSCOPIC COMMENT: ABNORMAL
MONOCYTES # BLD AUTO: 0.7 K/UL (ref 0.3–1)
MONOCYTES NFR BLD: 9.7 % (ref 4–15)
NEUTROPHILS # BLD AUTO: 4 K/UL (ref 1.8–7.7)
NEUTROPHILS NFR BLD: 53.3 % (ref 38–73)
NITRITE UR QL STRIP: NEGATIVE
NRBC BLD-RTO: 0 /100 WBC
PH UR STRIP: 6 [PH] (ref 5–8)
PLATELET # BLD AUTO: 301 K/UL (ref 150–450)
PMV BLD AUTO: 8.9 FL (ref 9.2–12.9)
POTASSIUM SERPL-SCNC: 3.6 MMOL/L (ref 3.5–5.1)
PROT UR QL STRIP: NEGATIVE
RBC # BLD AUTO: 4.32 M/UL (ref 4–5.4)
RBC #/AREA URNS HPF: 1 /HPF (ref 0–4)
SODIUM SERPL-SCNC: 132 MMOL/L (ref 136–145)
SP GR UR STRIP: <=1.005 (ref 1–1.03)
SQUAMOUS #/AREA URNS HPF: 2 /HPF
URN SPEC COLLECT METH UR: ABNORMAL
UROBILINOGEN UR STRIP-ACNC: NEGATIVE EU/DL
WBC # BLD AUTO: 7.53 K/UL (ref 3.9–12.7)
WBC #/AREA URNS HPF: 1 /HPF (ref 0–5)

## 2022-07-14 PROCEDURE — 85025 COMPLETE CBC W/AUTO DIFF WBC: CPT | Performed by: INTERNAL MEDICINE

## 2022-07-14 PROCEDURE — 83036 HEMOGLOBIN GLYCOSYLATED A1C: CPT | Performed by: INTERNAL MEDICINE

## 2022-07-14 PROCEDURE — 80048 BASIC METABOLIC PNL TOTAL CA: CPT | Performed by: INTERNAL MEDICINE

## 2022-07-14 PROCEDURE — 36415 COLL VENOUS BLD VENIPUNCTURE: CPT | Performed by: INTERNAL MEDICINE

## 2022-07-14 PROCEDURE — 81000 URINALYSIS NONAUTO W/SCOPE: CPT | Performed by: INTERNAL MEDICINE

## 2022-07-15 ENCOUNTER — PATIENT MESSAGE (OUTPATIENT)
Dept: PAIN MEDICINE | Facility: CLINIC | Age: 62
End: 2022-07-15
Payer: MEDICARE

## 2022-07-18 ENCOUNTER — PATIENT MESSAGE (OUTPATIENT)
Dept: PAIN MEDICINE | Facility: CLINIC | Age: 62
End: 2022-07-18
Payer: MEDICARE

## 2022-07-19 ENCOUNTER — PATIENT MESSAGE (OUTPATIENT)
Dept: PAIN MEDICINE | Facility: CLINIC | Age: 62
End: 2022-07-19
Payer: MEDICARE

## 2022-07-23 ENCOUNTER — PATIENT MESSAGE (OUTPATIENT)
Dept: PAIN MEDICINE | Facility: CLINIC | Age: 62
End: 2022-07-23
Payer: MEDICARE

## 2022-07-29 ENCOUNTER — OFFICE VISIT (OUTPATIENT)
Dept: PAIN MEDICINE | Facility: CLINIC | Age: 62
End: 2022-07-29
Payer: MEDICARE

## 2022-07-29 DIAGNOSIS — M25.511 CHRONIC PAIN OF BOTH SHOULDERS: ICD-10-CM

## 2022-07-29 DIAGNOSIS — G89.29 CHRONIC PAIN OF BOTH SHOULDERS: ICD-10-CM

## 2022-07-29 DIAGNOSIS — M54.16 LUMBAR RADICULOPATHY: ICD-10-CM

## 2022-07-29 DIAGNOSIS — Z79.891 OPIOID CONTRACT EXISTS: Primary | ICD-10-CM

## 2022-07-29 DIAGNOSIS — M25.512 CHRONIC PAIN OF BOTH SHOULDERS: ICD-10-CM

## 2022-07-29 DIAGNOSIS — M54.12 CERVICAL RADICULOPATHY: ICD-10-CM

## 2022-07-29 DIAGNOSIS — G35 MULTIPLE SCLEROSIS: ICD-10-CM

## 2022-07-29 PROCEDURE — 99214 OFFICE O/P EST MOD 30 MIN: CPT | Mod: 95,,,

## 2022-07-29 PROCEDURE — 3044F PR MOST RECENT HEMOGLOBIN A1C LEVEL <7.0%: ICD-10-PCS | Mod: CPTII,95,,

## 2022-07-29 PROCEDURE — 99214 PR OFFICE/OUTPT VISIT, EST, LEVL IV, 30-39 MIN: ICD-10-PCS | Mod: 95,,,

## 2022-07-29 PROCEDURE — 1159F PR MEDICATION LIST DOCUMENTED IN MEDICAL RECORD: ICD-10-PCS | Mod: CPTII,95,,

## 2022-07-29 PROCEDURE — 3044F HG A1C LEVEL LT 7.0%: CPT | Mod: CPTII,95,,

## 2022-07-29 PROCEDURE — 1159F MED LIST DOCD IN RCRD: CPT | Mod: CPTII,95,,

## 2022-07-29 NOTE — PROGRESS NOTES
This note was completed with dictation software and grammatical errors may exist.    The patient location is: [place], [place]  The chief complaint leading to consultation is: Neck, back, knees, shoulder    Visit type: audiovisual    Face to Face time with patient: 12 minutes  30 minutes of total time spent on the encounter, which includes face to face time and non-face to face time preparing to see the patient (eg, review of tests), Obtaining and/or reviewing separately obtained history, Documenting clinical information in the electronic or other health record, Independently interpreting results (not separately reported) and communicating results to the patient/family/caregiver, or Care coordination (not separately reported).         Each patient to whom he or she provides medical services by telemedicine is:  (1) informed of the relationship between the physician and patient and the respective role of any other health care provider with respect to management of the patient; and (2) notified that he or she may decline to receive medical services by telemedicine and may withdraw from such care at any time.    Notes:         CC: Neck and bilateral arm pain, back pain, knee pain     HPI: The patient is a 62-year-old woman with a history of hypertension, diabetes, multiple sclerosis, lumbar postlaminectomy syndrome who presents in self-referral for continued low back pain and bilateral leg pain, neck pain.  She has been worked up for hyponatremia in the past and they recommended she reduce her water intake and increase her sodium intake with fluids that contain electrolytes.  She continues to kerr with this hyponatremia as she was previously scheduled for a shoulder replacement surgery however it was canceled due to hyponatremia the day of surgery.  Additionally she is reporting worsening neck and back pain with associated numbness and weakness.  She believes his weakness is both pain and overall weakness that may be from  her MS. She reports she continues to take hydrocodone 4 times daily without any significant relief.  She states during the 1st lower after taking the medication it takes some of the edge off but very little.  She also takes Lyrica, Cymbalta without significant relief.  She is very saddened by the condition that she is in and the constant pain.  This is causing her to have increased depression.  She is requesting a change to her current pain medications as they are no longer providing her with significant relief.      Pain intervention history: She has been followed by one of our previous [facility] pain management physicians, Dr. Chapa in [place] until recently.  She had undergone fusion surgery in 1983 and again surgery in 1993 for her lumbar spine.  She had undergone numerous lumbar spine injections in the past with good relief.  She has tried gabapentin with only minimal relief but states that Lyrica works best.  Sounds like she had a spinal cord stimulator trial in the past that did not help.  She has been taking hydrocodone up to 4 times a day for several years.  She is status post caudal epidural steroid injection on 8/6/14 with 60% relief.  She is status post caudal epidural steroid injection on 5/13/15 with 0% relief.  She is status post caudal epidural steroid injection on 10/2/15 with 0% relief.  She is status post C7-T1 cervical interlaminar epidural steroid injection on 9/7/16 with 100% relief of her bilateral arm pain and mild relief of her neck pain.  She is status post C7-T1 cervical interlaminar epidural steroid injection on 2/21/17 with at least 50% relief.  She is status post bilateral L1 transforaminal epidural steroid injections on 3/20/17 with 75% relief. She is status post C7-T1 cervical interlaminar epidural steroid injection on 12/22/17 and again on 2/9/18 with 90% relief of her neck and arm pain.  She is status post bilateral L1/2 transforaminal epidural steroid injections on 08/08/2018  with almost 100% relief of her low back pain.She is status post bilateral L1/2 transforaminal epidural steroid injections on 12/08/2020 with about 60% relief. She is status post C7-T1 interlaminar epidural steroid injection on 08/03/2021 with 0% relief.        Spine surgeries: She had undergone fusion surgery in 1983 and again surgery in 1993 for her lumbar spine.     Antineuropathics:  Lyrica 75 in the morning and 150 at night  NSAIDs:  Physical therapy:  Antidepressants:  Cymbalta 60 daily, venlafaxine 225  Muscle relaxers:  Opioids:  Hydrocodone 10/325 4 times daily  Antiplatelets/Anticoagulants:        ROS:She reports urinary frequency, joint stiffness, joint swelling, back pain, memory loss, anxiety, depression, difficulty sleeping and loss of balance.  Balance of review of systems is negative.    Medical, surgical, family and social history reviewed elsewhere in record.    Medications/Allergies: See med card    There were no vitals filed for this visit.      Physical exam:  Gen: A and O x3, sad, crying, well-groomed      Imaging:  MRI C-spine 10/10/17  Comparison MRI cervical spine 08/16/2016.  A minimal reversal of the mid cervical curvature again noted with anatomic alignment.  Degenerative changes of the discs including loss of height and desiccation also again noted at C4-5 and C5-6.  C3-4, moderate right facet arthropathy.  C4-5, bulging of the disc again noted with effacement of the adjacent ventral subarachnoid space.  C5-6, bulging of the disc again noted with superimposed small more focal right paracentral protrusion of the disc with complete effacement of ventral subarachnoid space and flattening of the ventral cord more so on the right, unchanged.  Overall mild degree of canal stenosis results.  Bilateral uncinate hypertrophy is present with moderate to severe bilateral foraminal stenosis.  C6-7, mild annular bulging slightly asymmetric to the left posteriorly.  C7-T1, small posterior midline/right  prominent midline protrusion, unchanged.  Mild annular bulging also suspected T1-2.  The cervical cord maintains normal signal throughout.  No abnormal intra-dural enhancement is seen.    MRI lumbar spine 2/1/17  There are postoperative changes of posterior instrumented fusion at L2-S1.  There is a grade I anterolisthesis of L5 on S1.  The lumbar vertebral bodies show normal height without evidence of acute compression fracture or pathologic marrow replacement process.  There is a levoconvex curvature of the lumbar spine. There is degenerative desiccation, disc space narrowing, and degenerative endplate change at T11-T12, T12-L1, L1-L2, and L5-S1.  There is near complete fusion of the L2, L3, L4, and L5 vertebral bodies.  There are probable postoperative changes of posterior decompression at L2-S1. The conus medullaris terminates at L1. The visualized retroperitoneal/abdominal soft tissue  structures are unremarkable.  T11-T12: There is a broad disc bulge which flattens the ventral aspect of the conus.  There is ligamentum flavum thickening and facet arthropathy.  There is moderate bilateral neuroforaminal stenosis present.  There is mild-moderate central canal stenosis.  T12-L1: There is a broad disc bulge with a superimposed ascending right paracentral disc extrusion which extends approximately 10 mm above the disc plane.  There is mild-moderate central canal stenosis present.  There is mild right neuroforaminal stenosis.  L1-L2: There is a broad disc bulge throughout the period there is ligamentum flavum thickening.  The central canal is obscured.  There is a suggestion of a possible right paracentral disc protrusion.  There is mild-moderate overall central canal stenosis.  The neural foramina are not assessed secondary to metal artifact.  L2-L3: No central canal or neuroforaminal stenosis. No disc protrusion or extrusion.  L3-L4: No central canal or neuroforaminal stenosis. No disc protrusion or  extrusion.  L4-L5: The left neural foramen is not optimally evaluated due to metal artifact.  No central canal stenosis or right neuroforaminal stenosis.  L5-S1: There is a grade I anterolisthesis of L5 on S1 with associated uncovering of the intervertebral disc.  No definite central canal stenosis or neuroforaminal stenosis.    11/27/20 MRI L spine  T12-L1: Mild disc osteophyte complex.  Prominent epidural fat.  Mild right and minimal left facet hypertrophy.  Compression of the thecal sac with minimal fluid surrounding the conus just above the T12 level.  This has progressed from prior study on 02/01/2017.  Moderate right and mild left foraminal stenosis.   L1-L2: Retrolisthesis.  Prominent epidural fat.  Spinal canal is somewhat obscured by artifact from hardware.  There appears to be moderate right and at least mild left narrowing of the lateral recesses with mild spinal canal stenosis.  This is progressed from prior study.  Foramina are partially obscured by artifact from hardware but there appears to be mild bilateral foraminal stenosis.   L2-L3: Fused.  No significant spinal canal stenosis.  Likely minimal bilateral foraminal stenosis.   L3-L4: Fused.  No significant spinal canal or foraminal stenosis.   L4-L5: Fused.  No significant spinal canal stenosis.  Foramina are partially obscured.  There appears to be mild-moderate right foraminal stenosis.   L5-S1: Fused.  Anterolisthesis.  Spinal canal is partially obscured but there appears to be no significant spinal canal stenosis.  Likely mild bilateral foraminal stenosis.    5/25/20 MRI C spine  There is multilevel cervical spondylosis.  Straightening of the cervical spine.  Disc space narrowing greatest at the C5-C6 level.  No evidence of congenital spinal stenosis.  No evidence of abnormal signal change involving the visualized portion of the spinal cord.   At the C2-C3 and C3-C4 levels there is no evidence of a focal disc abnormality.  There are facet  degenerative changes on the right at C3-C4.  No significant spinal canal or foraminal encroachment.   At the C4-C5 level there is a posterior disc bulge/osteophyte with mild effacement along the anterior margin of the subarachnoid space.  Facet degenerative changes greater on the right than the left.  Mild right greater than left foraminal encroachment.   At the C5-C6 level there is a posterior and right paracentral disc-osteophyte with effacement along the anterior subarachnoid space and spinal cord.  There are degenerative changes of the facets bilaterally resulting in a moderate degree of acquired spinal stenosis and foraminal encroachment.   At the C6-C7 level there is a minimal disc bulge/osteophyte without significant spinal canal or foraminal encroachment.   At the C7-T1 level there is a central disc-osteophyte mildly prominent to the right of midline with effacement along the anterior subarachnoid space.  No foraminal encroachment.       Assessment:  The patient is a 62-year-old woman with a history of hypertension, diabetes, multiple sclerosis, lumbar postlaminectomy syndrome who presents in self-referral for continued low back pain and bilateral leg pain, neck pain.   1. Opioid contract exists     2. Cervical radiculopathy     3. Lumbar radiculopathy     4. Multiple sclerosis     5. Chronic pain of both shoulders                                                                                                                                                                                                 Plan:  1. Today we discussed her pain and symptoms.  She is very tearful, emotional and sad during this visit.  2. We discussed continuing to monitor her hyponatremia and hopefully she can get the shoulder surgery.  I believe she needs to take her current condition 1 step at a time. 1st getting the shoulder surgery  3. Continue Lyrica 75/150mg.  Continue Cymbalta 60 mg  4. She is requesting an increase in her  pain medications.  She takes hydrocodone 10/325 4 times a day.  She reports during the 1st lower of taking the medication it takes some of the edge off but after that she finds no significant relief.  5. I will discuss her case with Dr. Seaman prior to submitting for refills.  6. Follow-up in 3 months or sooner if needed.        The total time spent for evaluation and management today including reviewing separately obtained history, performing a medically appropriate exam and evaluation, documenting clinical information in the health record, independently interpreting results and communicating them to the patient/family/caregiver, and ordering medications/tests/procedures was between 30-39 minutes.

## 2022-08-02 RX ORDER — HYDROCODONE BITARTRATE AND ACETAMINOPHEN 10; 325 MG/1; MG/1
1 TABLET ORAL EVERY 6 HOURS PRN
Qty: 120 TABLET | Refills: 0 | Status: SHIPPED | OUTPATIENT
Start: 2022-08-12 | End: 2022-09-09 | Stop reason: SDUPTHER

## 2022-08-02 RX ORDER — HYDROCODONE BITARTRATE AND ACETAMINOPHEN 10; 325 MG/1; MG/1
1 TABLET ORAL EVERY 6 HOURS PRN
Qty: 120 TABLET | Refills: 0 | Status: SHIPPED | OUTPATIENT
Start: 2022-10-11 | End: 2022-11-10 | Stop reason: SDUPTHER

## 2022-08-02 RX ORDER — HYDROCODONE BITARTRATE AND ACETAMINOPHEN 10; 325 MG/1; MG/1
1 TABLET ORAL EVERY 6 HOURS PRN
Qty: 120 TABLET | Refills: 0 | Status: SHIPPED | OUTPATIENT
Start: 2022-09-11 | End: 2022-10-11

## 2022-08-03 ENCOUNTER — PATIENT MESSAGE (OUTPATIENT)
Dept: PAIN MEDICINE | Facility: CLINIC | Age: 62
End: 2022-08-03
Payer: MEDICARE

## 2022-08-03 NOTE — TELEPHONE ENCOUNTER
At this time Dr. Seaman does not feel that it is appropriate to make any changes to her pain medications.    Does she is still take Cymbalta or Effexor?  If not, I would recommend restarting either of these medications for increased pain relief.    Additionally, over-the-counter medications such as Tylenol ibuprofen would be beneficial as well as muscle relaxers.    To improve her pain I think we need to take this one step at a time.  Firstly I feel that she needs to get a shoulder surgery, and then we can proceed with working on her back pain.    If she is interested we can provide her with orders for physical therapy.

## 2022-08-03 NOTE — TELEPHONE ENCOUNTER
We can do a virtual visit. This is the problem with taking 10mg hydrocodone for 15 years, it was never a solution and taking postoperative dosing of opioids was only going to cause tolerance. I did not put her on 10mg 4 times daily, she came to me taking this and it is certainly not the solution to the problem now. More pain medication will not improve her ability to walk.

## 2022-08-03 NOTE — TELEPHONE ENCOUNTER
If she would like to discuss this further with Dr. Seaman she can schedule a follow-up appointment with him.    Myself and Dr. Seaman understand that she is in pain however increasing her opiates is not a good long-term solution to her pain.

## 2022-09-09 ENCOUNTER — PATIENT MESSAGE (OUTPATIENT)
Dept: PAIN MEDICINE | Facility: CLINIC | Age: 62
End: 2022-09-09
Payer: MEDICARE

## 2022-09-09 RX ORDER — HYDROCODONE BITARTRATE AND ACETAMINOPHEN 10; 325 MG/1; MG/1
1 TABLET ORAL EVERY 6 HOURS PRN
Qty: 120 TABLET | Refills: 0 | Status: SHIPPED | OUTPATIENT
Start: 2022-09-10 | End: 2022-10-10

## 2022-09-09 NOTE — TELEPHONE ENCOUNTER
Spoke with pharmacy to try to give verbal to approve early fill. They will need a new Rx with date of 9/10. Please advise on Rx.

## 2022-11-10 ENCOUNTER — PATIENT MESSAGE (OUTPATIENT)
Dept: PAIN MEDICINE | Facility: CLINIC | Age: 62
End: 2022-11-10
Payer: MEDICARE

## 2022-11-10 RX ORDER — HYDROCODONE BITARTRATE AND ACETAMINOPHEN 10; 325 MG/1; MG/1
1 TABLET ORAL EVERY 6 HOURS PRN
Qty: 120 TABLET | Refills: 0 | Status: SHIPPED | OUTPATIENT
Start: 2022-11-10 | End: 2022-12-08 | Stop reason: SDUPTHER

## 2022-11-16 ENCOUNTER — PATIENT MESSAGE (OUTPATIENT)
Dept: PAIN MEDICINE | Facility: CLINIC | Age: 62
End: 2022-11-16
Payer: MEDICARE

## 2022-11-17 ENCOUNTER — PATIENT MESSAGE (OUTPATIENT)
Dept: PAIN MEDICINE | Facility: CLINIC | Age: 62
End: 2022-11-17
Payer: MEDICARE

## 2022-11-17 NOTE — TELEPHONE ENCOUNTER
Yes, please have her follow-up in clinic.  She has not had a physical exam since January and we need to make sure we will be scheduling the proper treatment.

## 2022-12-01 ENCOUNTER — PATIENT MESSAGE (OUTPATIENT)
Dept: PSYCHIATRY | Facility: CLINIC | Age: 62
End: 2022-12-01
Payer: MEDICARE

## 2022-12-02 ENCOUNTER — TELEPHONE (OUTPATIENT)
Dept: NEUROLOGY | Facility: CLINIC | Age: 62
End: 2022-12-02
Payer: MEDICARE

## 2022-12-02 NOTE — TELEPHONE ENCOUNTER
----- Message from Lida Abraham sent at 12/2/2022  1:11 PM CST -----  Contact: pt- [phone]  Good afternoon,    Pt is requesting to re-establish care with provider. She is requesting an appointment between 10:30-12 any day after [place]. Pt is requesting a call back.    Thank you,  Lida ROBERTS  Access Navigator

## 2022-12-08 ENCOUNTER — PATIENT MESSAGE (OUTPATIENT)
Dept: PAIN MEDICINE | Facility: CLINIC | Age: 62
End: 2022-12-08
Payer: MEDICARE

## 2022-12-09 ENCOUNTER — PATIENT MESSAGE (OUTPATIENT)
Dept: PAIN MEDICINE | Facility: CLINIC | Age: 62
End: 2022-12-09
Payer: MEDICARE

## 2022-12-09 RX ORDER — HYDROCODONE BITARTRATE AND ACETAMINOPHEN 10; 325 MG/1; MG/1
1 TABLET ORAL EVERY 6 HOURS PRN
Qty: 120 TABLET | Refills: 0 | Status: SHIPPED | OUTPATIENT
Start: 2022-12-10 | End: 2023-01-11 | Stop reason: SDUPTHER

## 2023-01-06 ENCOUNTER — PATIENT MESSAGE (OUTPATIENT)
Dept: NEUROLOGY | Facility: CLINIC | Age: 63
End: 2023-01-06

## 2023-01-06 ENCOUNTER — TELEPHONE (OUTPATIENT)
Dept: NEUROLOGY | Facility: CLINIC | Age: 63
End: 2023-01-06
Payer: MEDICARE

## 2023-01-09 ENCOUNTER — TELEPHONE (OUTPATIENT)
Dept: PAIN MEDICINE | Facility: CLINIC | Age: 63
End: 2023-01-09
Payer: MEDICARE

## 2023-01-09 NOTE — TELEPHONE ENCOUNTER
Pt states she would like her pain med refilled but will wait until her appt 01/11 due to wants a paper RX. Thank you.

## 2023-01-09 NOTE — TELEPHONE ENCOUNTER
----- Message from Lorna Torrse sent at 1/9/2023  3:17 PM CST -----  Contact: pt  Type: Needs Medical Advice  Who Called:  pt     Best Call Back Number: [phone]    Additional Information: pt would like to speak with someone in staff. Please advise.

## 2023-01-11 ENCOUNTER — OFFICE VISIT (OUTPATIENT)
Dept: PAIN MEDICINE | Facility: CLINIC | Age: 63
End: 2023-01-11
Payer: MEDICARE

## 2023-01-11 ENCOUNTER — TELEPHONE (OUTPATIENT)
Dept: PAIN MEDICINE | Facility: CLINIC | Age: 63
End: 2023-01-11
Payer: MEDICARE

## 2023-01-11 VITALS
DIASTOLIC BLOOD PRESSURE: 69 MMHG | HEART RATE: 65 BPM | SYSTOLIC BLOOD PRESSURE: 125 MMHG | HEIGHT: 66 IN | BODY MASS INDEX: 31.96 KG/M2 | WEIGHT: 198.88 LBS

## 2023-01-11 DIAGNOSIS — M25.562 CHRONIC PAIN OF LEFT KNEE: ICD-10-CM

## 2023-01-11 DIAGNOSIS — G89.29 CHRONIC PAIN OF BOTH SHOULDERS: ICD-10-CM

## 2023-01-11 DIAGNOSIS — M50.30 DDD (DEGENERATIVE DISC DISEASE), CERVICAL: ICD-10-CM

## 2023-01-11 DIAGNOSIS — M25.511 CHRONIC PAIN OF BOTH SHOULDERS: ICD-10-CM

## 2023-01-11 DIAGNOSIS — G89.29 CHRONIC PAIN OF LEFT KNEE: ICD-10-CM

## 2023-01-11 DIAGNOSIS — M51.36 DDD (DEGENERATIVE DISC DISEASE), LUMBAR: ICD-10-CM

## 2023-01-11 DIAGNOSIS — M96.1 POSTLAMINECTOMY SYNDROME OF LUMBAR REGION: ICD-10-CM

## 2023-01-11 DIAGNOSIS — Z79.891 OPIOID CONTRACT EXISTS: ICD-10-CM

## 2023-01-11 DIAGNOSIS — M48.02 CERVICAL STENOSIS OF SPINAL CANAL: ICD-10-CM

## 2023-01-11 DIAGNOSIS — M25.512 CHRONIC PAIN OF BOTH SHOULDERS: ICD-10-CM

## 2023-01-11 DIAGNOSIS — M54.16 LUMBAR RADICULOPATHY: Primary | ICD-10-CM

## 2023-01-11 PROCEDURE — 1159F MED LIST DOCD IN RCRD: CPT | Mod: CPTII,S$GLB,, | Performed by: PHYSICIAN ASSISTANT

## 2023-01-11 PROCEDURE — 3008F BODY MASS INDEX DOCD: CPT | Mod: CPTII,S$GLB,, | Performed by: PHYSICIAN ASSISTANT

## 2023-01-11 PROCEDURE — 3078F DIAST BP <80 MM HG: CPT | Mod: CPTII,S$GLB,, | Performed by: PHYSICIAN ASSISTANT

## 2023-01-11 PROCEDURE — 1159F PR MEDICATION LIST DOCUMENTED IN MEDICAL RECORD: ICD-10-PCS | Mod: CPTII,S$GLB,, | Performed by: PHYSICIAN ASSISTANT

## 2023-01-11 PROCEDURE — 1160F RVW MEDS BY RX/DR IN RCRD: CPT | Mod: CPTII,S$GLB,, | Performed by: PHYSICIAN ASSISTANT

## 2023-01-11 PROCEDURE — 99215 PR OFFICE/OUTPT VISIT, EST, LEVL V, 40-54 MIN: ICD-10-PCS | Mod: S$GLB,,, | Performed by: PHYSICIAN ASSISTANT

## 2023-01-11 PROCEDURE — 99215 OFFICE O/P EST HI 40 MIN: CPT | Mod: S$GLB,,, | Performed by: PHYSICIAN ASSISTANT

## 2023-01-11 PROCEDURE — 3008F PR BODY MASS INDEX (BMI) DOCUMENTED: ICD-10-PCS | Mod: CPTII,S$GLB,, | Performed by: PHYSICIAN ASSISTANT

## 2023-01-11 PROCEDURE — 3074F PR MOST RECENT SYSTOLIC BLOOD PRESSURE < 130 MM HG: ICD-10-PCS | Mod: CPTII,S$GLB,, | Performed by: PHYSICIAN ASSISTANT

## 2023-01-11 PROCEDURE — 3078F PR MOST RECENT DIASTOLIC BLOOD PRESSURE < 80 MM HG: ICD-10-PCS | Mod: CPTII,S$GLB,, | Performed by: PHYSICIAN ASSISTANT

## 2023-01-11 PROCEDURE — 99999 PR PBB SHADOW E&M-EST. PATIENT-LVL V: ICD-10-PCS | Mod: PBBFAC,,, | Performed by: PHYSICIAN ASSISTANT

## 2023-01-11 PROCEDURE — 1160F PR REVIEW ALL MEDS BY PRESCRIBER/CLIN PHARMACIST DOCUMENTED: ICD-10-PCS | Mod: CPTII,S$GLB,, | Performed by: PHYSICIAN ASSISTANT

## 2023-01-11 PROCEDURE — 99999 PR PBB SHADOW E&M-EST. PATIENT-LVL V: CPT | Mod: PBBFAC,,, | Performed by: PHYSICIAN ASSISTANT

## 2023-01-11 PROCEDURE — 3074F SYST BP LT 130 MM HG: CPT | Mod: CPTII,S$GLB,, | Performed by: PHYSICIAN ASSISTANT

## 2023-01-11 PROCEDURE — 80326 AMPHETAMINES 5 OR MORE: CPT | Performed by: PHYSICIAN ASSISTANT

## 2023-01-11 RX ORDER — ALPRAZOLAM 1 MG/1
TABLET ORAL
Qty: 2 TABLET | Refills: 0 | Status: SHIPPED | OUTPATIENT
Start: 2023-01-11 | End: 2023-01-23 | Stop reason: SDUPTHER

## 2023-01-11 RX ORDER — HYDROCODONE BITARTRATE AND ACETAMINOPHEN 10; 325 MG/1; MG/1
1 TABLET ORAL EVERY 6 HOURS PRN
Qty: 120 TABLET | Refills: 0 | Status: SHIPPED | OUTPATIENT
Start: 2023-01-11 | End: 2023-02-10

## 2023-01-11 RX ORDER — HYDROCODONE BITARTRATE AND ACETAMINOPHEN 10; 325 MG/1; MG/1
1 TABLET ORAL EVERY 6 HOURS PRN
Qty: 120 TABLET | Refills: 0 | Status: SHIPPED | OUTPATIENT
Start: 2023-03-12 | End: 2023-03-03 | Stop reason: SDUPTHER

## 2023-01-11 RX ORDER — HYDROCODONE BITARTRATE AND ACETAMINOPHEN 10; 325 MG/1; MG/1
1 TABLET ORAL EVERY 6 HOURS PRN
Qty: 120 TABLET | Refills: 0 | Status: SHIPPED | OUTPATIENT
Start: 2023-02-10 | End: 2023-02-10 | Stop reason: SDUPTHER

## 2023-01-11 RX ORDER — ALPRAZOLAM 0.5 MG/1
1 TABLET, ORALLY DISINTEGRATING ORAL ONCE AS NEEDED
Status: CANCELLED | OUTPATIENT
Start: 2023-01-11 | End: 2034-06-09

## 2023-01-11 NOTE — PROGRESS NOTES
This note was completed with dictation software and grammatical errors may exist.    CC: Neck and bilateral arm pain, back pain, knee pain     HPI: The patient is a 62-year-old woman with a history of hypertension, diabetes, multiple sclerosis, lumbar postlaminectomy syndrome who presents in self-referral for continued low back pain and bilateral leg pain, neck pain.      She returns in follow-up today with multiple pain complaints including neck pain, bilateral shoulder pain, low back pain, bilateral leg pain and left knee pain.  She describes neck pain that radiates down her arms and has not followed up with Neurosurgery since her surgery was  canceled just over a year ago.  She also states that she needs a left shoulder replacement but was told by Neurosurgery that she should have her cervical spine done 1st.  Her main complaint today is bilateral low back pain radiating down her buttocks and to her entire legs similar to the pain she has had in the past prior to epidural steroid injections.  She would like to repeat a lumbar epidural steroid injection.  She is requesting to change or increase her pain medication today which she had asked last year as well.  She reports having weakness in her arms and legs, denies current numbness.    Pain intervention history: She has been followed by one of our previous [facility] pain management physicians, Dr. Chapa in [place] until recently.  She had undergone fusion surgery in 1983 and again surgery in 1993 for her lumbar spine.  She had undergone numerous lumbar spine injections in the past with good relief.  She has tried gabapentin with only minimal relief but states that Lyrica works best.  Sounds like she had a spinal cord stimulator trial in the past that did not help.  She has been taking hydrocodone up to 4 times a day for several years.  She is status post caudal epidural steroid injection on 8/6/14 with 60% relief.  She is status post caudal epidural steroid  "injection on 5/13/15 with 0% relief.  She is status post caudal epidural steroid injection on 10/2/15 with 0% relief.  She is status post C7-T1 cervical interlaminar epidural steroid injection on 9/7/16 with 100% relief of her bilateral arm pain and mild relief of her neck pain.  She is status post C7-T1 cervical interlaminar epidural steroid injection on 2/21/17 with at least 50% relief.  She is status post bilateral L1 transforaminal epidural steroid injections on 3/20/17 with 75% relief. She is status post C7-T1 cervical interlaminar epidural steroid injection on 12/22/17 and again on 2/9/18 with 90% relief of her neck and arm pain.  She is status post bilateral L1/2 transforaminal epidural steroid injections on 08/08/2018 with almost 100% relief of her low back pain.She is status post bilateral L1/2 transforaminal epidural steroid injections on 12/08/2020 with about 60% relief. She is status post C7-T1 interlaminar epidural steroid injection on 08/03/2021 with 0% relief.      Spine surgeries: She had undergone fusion surgery in 1983 and again surgery in 1993 for her lumbar spine.     Antineuropathics:  Lyrica 75 in the morning and 150 at night  NSAIDs:  Physical therapy:  Antidepressants:  Cymbalta 60 daily, venlafaxine 225  Muscle relaxers:  Opioids:  Hydrocodone 10/325 4 times daily  Antiplatelets/Anticoagulants:    ROS:She reports urinary frequency, joint stiffness, joint swelling, back pain, memory loss, anxiety, depression, difficulty sleeping and loss of balance.  Balance of review of systems is negative.    Medical, surgical, family and social history reviewed elsewhere in record.    Medications/Allergies: See med card    Vitals:    01/11/23 1109   BP: 125/69   Pulse: 65   Weight: 90.2 kg (198 lb 13.7 oz)   Height: 5' 5.5" (1.664 m)   PainSc: 10-Worst pain ever   PainLoc: Back         Physical exam:  Gen: A and O x3, pleasant, well-groomed  Skin: No rashes or obvious lesions  HEENT: PERRLA, no obvious " deformities on ears or in canals.Trachea midline.  CVS: Regular rate and rhythm, normal palpable pulses.  Resp: Clear to auscultation bilaterally, no wheezes or rales.  Abdomen: Soft, NT/ND.  Musculoskeletal:   Presents in a wheelchair, ambulates with a walker.  Able to stand for a short period of time on her own.    Neuro:  Motor:    Right Left   C4 Shoulder Abduction  5  5   C5 Elbow Flexion    5  5   C6 Wrist Extension  5  5   C7 Elbow Extension   5  5   C8/T1 Hand Intrinsics   5  5   C8 First Dorsal Interosseus  5  5   C8 Abductor Pollicus Brevis  5  5       Iliopsoas Quadriceps Knee  Flexion Tibialis  anterior Gastro- cnemius EHL   Lower: R 5/5 5/5 5/5 4+/5 5/5 5/5    L 5/5 5/5 5/5 5/5 5/5 5/5        Left  Right    Triceps DTR 1+ 1+   Biceps DTR 2+ 2+   Brachioradialis DTR 0+ 0+   Patellar DTR 0+ 0+   Achilles DTR 2+ 2+   Yu Absent  Absent   Clonus Absent Absent   Babinski Absent Absent     Sensory: Intact and symmetrical to light touch and pinprick in C2-T1 dermatomes bilaterally. Intact and symmetrical to light touch and pinprick in L1-S1 dermatomes bilaterally.    Cervical spine: ROM is   Mildly reduced in flexion, extension and lateral rotation with increased pain during each maneuver.  Spurling's maneuver causes no neck pain to either side.  Myofascial exam: No Tenderness to palpation across cervical paraspinous region bilaterally.    Lumbar spine:  Lumbar spine: ROM is moderately reduced with flexion and severely reduced with extension with increased low back pain during any maneuver.  Jeff's test   Is deferred.  Supine straight leg raise is negative bilaterally.    Internal and external rotation of the hip   Is deferred.  Myofascial exam:   Exquisite tenderness to palpation to the bilateral lumbar paraspinous muscles.    Imaging:  MRI C-spine 10/10/17  Comparison MRI cervical spine 08/16/2016.  A minimal reversal of the mid cervical curvature again noted with anatomic alignment.  Degenerative  changes of the discs including loss of height and desiccation also again noted at C4-5 and C5-6.  C3-4, moderate right facet arthropathy.  C4-5, bulging of the disc again noted with effacement of the adjacent ventral subarachnoid space.  C5-6, bulging of the disc again noted with superimposed small more focal right paracentral protrusion of the disc with complete effacement of ventral subarachnoid space and flattening of the ventral cord more so on the right, unchanged.  Overall mild degree of canal stenosis results.  Bilateral uncinate hypertrophy is present with moderate to severe bilateral foraminal stenosis.  C6-7, mild annular bulging slightly asymmetric to the left posteriorly.  C7-T1, small posterior midline/right prominent midline protrusion, unchanged.  Mild annular bulging also suspected T1-2.  The cervical cord maintains normal signal throughout.  No abnormal intra-dural enhancement is seen.    MRI lumbar spine 2/1/17  There are postoperative changes of posterior instrumented fusion at L2-S1.  There is a grade I anterolisthesis of L5 on S1.  The lumbar vertebral bodies show normal height without evidence of acute compression fracture or pathologic marrow replacement process.  There is a levoconvex curvature of the lumbar spine. There is degenerative desiccation, disc space narrowing, and degenerative endplate change at T11-T12, T12-L1, L1-L2, and L5-S1.  There is near complete fusion of the L2, L3, L4, and L5 vertebral bodies.  There are probable postoperative changes of posterior decompression at L2-S1. The conus medullaris terminates at L1. The visualized retroperitoneal/abdominal soft tissue  structures are unremarkable.  T11-T12: There is a broad disc bulge which flattens the ventral aspect of the conus.  There is ligamentum flavum thickening and facet arthropathy.  There is moderate bilateral neuroforaminal stenosis present.  There is mild-moderate central canal stenosis.  T12-L1: There is a broad  disc bulge with a superimposed ascending right paracentral disc extrusion which extends approximately 10 mm above the disc plane.  There is mild-moderate central canal stenosis present.  There is mild right neuroforaminal stenosis.  L1-L2: There is a broad disc bulge throughout the period there is ligamentum flavum thickening.  The central canal is obscured.  There is a suggestion of a possible right paracentral disc protrusion.  There is mild-moderate overall central canal stenosis.  The neural foramina are not assessed secondary to metal artifact.  L2-L3: No central canal or neuroforaminal stenosis. No disc protrusion or extrusion.  L3-L4: No central canal or neuroforaminal stenosis. No disc protrusion or extrusion.  L4-L5: The left neural foramen is not optimally evaluated due to metal artifact.  No central canal stenosis or right neuroforaminal stenosis.  L5-S1: There is a grade I anterolisthesis of L5 on S1 with associated uncovering of the intervertebral disc.  No definite central canal stenosis or neuroforaminal stenosis.    11/27/20 MRI L spine  T12-L1: Mild disc osteophyte complex.  Prominent epidural fat.  Mild right and minimal left facet hypertrophy.  Compression of the thecal sac with minimal fluid surrounding the conus just above the T12 level.  This has progressed from prior study on 02/01/2017.  Moderate right and mild left foraminal stenosis.   L1-L2: Retrolisthesis.  Prominent epidural fat.  Spinal canal is somewhat obscured by artifact from hardware.  There appears to be moderate right and at least mild left narrowing of the lateral recesses with mild spinal canal stenosis.  This is progressed from prior study.  Foramina are partially obscured by artifact from hardware but there appears to be mild bilateral foraminal stenosis.   L2-L3: Fused.  No significant spinal canal stenosis.  Likely minimal bilateral foraminal stenosis.   L3-L4: Fused.  No significant spinal canal or foraminal stenosis.    L4-L5: Fused.  No significant spinal canal stenosis.  Foramina are partially obscured.  There appears to be mild-moderate right foraminal stenosis.   L5-S1: Fused.  Anterolisthesis.  Spinal canal is partially obscured but there appears to be no significant spinal canal stenosis.  Likely mild bilateral foraminal stenosis.    5/25/20 MRI C spine  There is multilevel cervical spondylosis.  Straightening of the cervical spine.  Disc space narrowing greatest at the C5-C6 level.  No evidence of congenital spinal stenosis.  No evidence of abnormal signal change involving the visualized portion of the spinal cord.   At the C2-C3 and C3-C4 levels there is no evidence of a focal disc abnormality.  There are facet degenerative changes on the right at C3-C4.  No significant spinal canal or foraminal encroachment.   At the C4-C5 level there is a posterior disc bulge/osteophyte with mild effacement along the anterior margin of the subarachnoid space.  Facet degenerative changes greater on the right than the left.  Mild right greater than left foraminal encroachment.   At the C5-C6 level there is a posterior and right paracentral disc-osteophyte with effacement along the anterior subarachnoid space and spinal cord.  There are degenerative changes of the facets bilaterally resulting in a moderate degree of acquired spinal stenosis and foraminal encroachment.   At the C6-C7 level there is a minimal disc bulge/osteophyte without significant spinal canal or foraminal encroachment.   At the C7-T1 level there is a central disc-osteophyte mildly prominent to the right of midline with effacement along the anterior subarachnoid space.  No foraminal encroachment.       Assessment:  The patient is a 62-year-old woman with a history of hypertension, diabetes, multiple sclerosis, lumbar postlaminectomy syndrome who presents in self-referral for continued low back pain and bilateral leg pain, neck pain.   1. Lumbar radiculopathy  Place in  Outpatient    Vital signs    Verify informed consent    Notify physician     Notify physician     Notify physician (specify)    Diet NPO    alprazolam ODT dissolvable tablet 1 mg    Case Request Operating Room: Injection,steroid,epidural,transforaminal approach L1/2      2. DDD (degenerative disc disease), lumbar        3. Postlaminectomy syndrome of lumbar region        4. DDD (degenerative disc disease), cervical  MRI Cervical Spine Without Contrast      5. Cervical stenosis of spinal canal        6. Chronic pain of both shoulders        7. Chronic pain of left knee        8. Opioid contract exists  [facility] Drug Screen                                                                                                                                                                                                  Plan:    1.  For her low back and leg pain I will schedule her to repeat bilateral L1/2 transforaminal epidural steroid injections.  She has done well with this in the past.    2.  She was scheduled for cervical spine surgery over 1 year ago and this was canceled due to lab abnormalities.  I am going to update her cervical spine MRI and have her follow up with Neurosurgery to discuss.    3.  Dr. Seaman provided prescriptions for hydrocodone -acetaminophen 10/325 mg 4 times daily as needed for pain.  She requested to increase or change her pain medication today and I reviewed this with Dr. Seaman.  We do not suggest increasing or changing opioids as she will only developed further tolerance.  Urine drug screen was collected today.  She was due for her medication 2 days ago and states that she took Percocet from her daughter-in-law since she was out of hydrocodone.  I have reviewed the [place] Board of Pharmacy website and there are no abberancies.    4.  Follow-up in 4 weeks postprocedure or sooner as needed.    The total time spent for evaluation and management on 01/11/2023 including reviewing  separately obtained history, performing a medically appropriate exam and evaluation, documenting clinical information in the health record, independently interpreting results and communicating them to the patient/family/caregiver, and ordering medications/tests/procedures was between 40-54 minutes.

## 2023-01-11 NOTE — H&P (VIEW-ONLY)
This note was completed with dictation software and grammatical errors may exist.    CC: Neck and bilateral arm pain, back pain, knee pain     HPI: The patient is a 62-year-old woman with a history of hypertension, diabetes, multiple sclerosis, lumbar postlaminectomy syndrome who presents in self-referral for continued low back pain and bilateral leg pain, neck pain.      She returns in follow-up today with multiple pain complaints including neck pain, bilateral shoulder pain, low back pain, bilateral leg pain and left knee pain.  She describes neck pain that radiates down her arms and has not followed up with Neurosurgery since her surgery was  canceled just over a year ago.  She also states that she needs a left shoulder replacement but was told by Neurosurgery that she should have her cervical spine done 1st.  Her main complaint today is bilateral low back pain radiating down her buttocks and to her entire legs similar to the pain she has had in the past prior to epidural steroid injections.  She would like to repeat a lumbar epidural steroid injection.  She is requesting to change or increase her pain medication today which she had asked last year as well.  She reports having weakness in her arms and legs, denies current numbness.    Pain intervention history: She has been followed by one of our previous [facility] pain management physicians, Dr. Chapa in [place] until recently.  She had undergone fusion surgery in 1983 and again surgery in 1993 for her lumbar spine.  She had undergone numerous lumbar spine injections in the past with good relief.  She has tried gabapentin with only minimal relief but states that Lyrica works best.  Sounds like she had a spinal cord stimulator trial in the past that did not help.  She has been taking hydrocodone up to 4 times a day for several years.  She is status post caudal epidural steroid injection on 8/6/14 with 60% relief.  She is status post caudal epidural steroid  "injection on 5/13/15 with 0% relief.  She is status post caudal epidural steroid injection on 10/2/15 with 0% relief.  She is status post C7-T1 cervical interlaminar epidural steroid injection on 9/7/16 with 100% relief of her bilateral arm pain and mild relief of her neck pain.  She is status post C7-T1 cervical interlaminar epidural steroid injection on 2/21/17 with at least 50% relief.  She is status post bilateral L1 transforaminal epidural steroid injections on 3/20/17 with 75% relief. She is status post C7-T1 cervical interlaminar epidural steroid injection on 12/22/17 and again on 2/9/18 with 90% relief of her neck and arm pain.  She is status post bilateral L1/2 transforaminal epidural steroid injections on 08/08/2018 with almost 100% relief of her low back pain.She is status post bilateral L1/2 transforaminal epidural steroid injections on 12/08/2020 with about 60% relief. She is status post C7-T1 interlaminar epidural steroid injection on 08/03/2021 with 0% relief.      Spine surgeries: She had undergone fusion surgery in 1983 and again surgery in 1993 for her lumbar spine.     Antineuropathics:  Lyrica 75 in the morning and 150 at night  NSAIDs:  Physical therapy:  Antidepressants:  Cymbalta 60 daily, venlafaxine 225  Muscle relaxers:  Opioids:  Hydrocodone 10/325 4 times daily  Antiplatelets/Anticoagulants:    ROS:She reports urinary frequency, joint stiffness, joint swelling, back pain, memory loss, anxiety, depression, difficulty sleeping and loss of balance.  Balance of review of systems is negative.    Medical, surgical, family and social history reviewed elsewhere in record.    Medications/Allergies: See med card    Vitals:    01/11/23 1109   BP: 125/69   Pulse: 65   Weight: 90.2 kg (198 lb 13.7 oz)   Height: 5' 5.5" (1.664 m)   PainSc: 10-Worst pain ever   PainLoc: Back         Physical exam:  Gen: A and O x3, pleasant, well-groomed  Skin: No rashes or obvious lesions  HEENT: PERRLA, no obvious " deformities on ears or in canals.Trachea midline.  CVS: Regular rate and rhythm, normal palpable pulses.  Resp: Clear to auscultation bilaterally, no wheezes or rales.  Abdomen: Soft, NT/ND.  Musculoskeletal:   Presents in a wheelchair, ambulates with a walker.  Able to stand for a short period of time on her own.    Neuro:  Motor:    Right Left   C4 Shoulder Abduction  5  5   C5 Elbow Flexion    5  5   C6 Wrist Extension  5  5   C7 Elbow Extension   5  5   C8/T1 Hand Intrinsics   5  5   C8 First Dorsal Interosseus  5  5   C8 Abductor Pollicus Brevis  5  5       Iliopsoas Quadriceps Knee  Flexion Tibialis  anterior Gastro- cnemius EHL   Lower: R 5/5 5/5 5/5 4+/5 5/5 5/5    L 5/5 5/5 5/5 5/5 5/5 5/5        Left  Right    Triceps DTR 1+ 1+   Biceps DTR 2+ 2+   Brachioradialis DTR 0+ 0+   Patellar DTR 0+ 0+   Achilles DTR 2+ 2+   Yu Absent  Absent   Clonus Absent Absent   Babinski Absent Absent     Sensory: Intact and symmetrical to light touch and pinprick in C2-T1 dermatomes bilaterally. Intact and symmetrical to light touch and pinprick in L1-S1 dermatomes bilaterally.    Cervical spine: ROM is   Mildly reduced in flexion, extension and lateral rotation with increased pain during each maneuver.  Spurling's maneuver causes no neck pain to either side.  Myofascial exam: No Tenderness to palpation across cervical paraspinous region bilaterally.    Lumbar spine:  Lumbar spine: ROM is moderately reduced with flexion and severely reduced with extension with increased low back pain during any maneuver.  Jeff's test   Is deferred.  Supine straight leg raise is negative bilaterally.    Internal and external rotation of the hip   Is deferred.  Myofascial exam:   Exquisite tenderness to palpation to the bilateral lumbar paraspinous muscles.    Imaging:  MRI C-spine 10/10/17  Comparison MRI cervical spine 08/16/2016.  A minimal reversal of the mid cervical curvature again noted with anatomic alignment.  Degenerative  changes of the discs including loss of height and desiccation also again noted at C4-5 and C5-6.  C3-4, moderate right facet arthropathy.  C4-5, bulging of the disc again noted with effacement of the adjacent ventral subarachnoid space.  C5-6, bulging of the disc again noted with superimposed small more focal right paracentral protrusion of the disc with complete effacement of ventral subarachnoid space and flattening of the ventral cord more so on the right, unchanged.  Overall mild degree of canal stenosis results.  Bilateral uncinate hypertrophy is present with moderate to severe bilateral foraminal stenosis.  C6-7, mild annular bulging slightly asymmetric to the left posteriorly.  C7-T1, small posterior midline/right prominent midline protrusion, unchanged.  Mild annular bulging also suspected T1-2.  The cervical cord maintains normal signal throughout.  No abnormal intra-dural enhancement is seen.    MRI lumbar spine 2/1/17  There are postoperative changes of posterior instrumented fusion at L2-S1.  There is a grade I anterolisthesis of L5 on S1.  The lumbar vertebral bodies show normal height without evidence of acute compression fracture or pathologic marrow replacement process.  There is a levoconvex curvature of the lumbar spine. There is degenerative desiccation, disc space narrowing, and degenerative endplate change at T11-T12, T12-L1, L1-L2, and L5-S1.  There is near complete fusion of the L2, L3, L4, and L5 vertebral bodies.  There are probable postoperative changes of posterior decompression at L2-S1. The conus medullaris terminates at L1. The visualized retroperitoneal/abdominal soft tissue  structures are unremarkable.  T11-T12: There is a broad disc bulge which flattens the ventral aspect of the conus.  There is ligamentum flavum thickening and facet arthropathy.  There is moderate bilateral neuroforaminal stenosis present.  There is mild-moderate central canal stenosis.  T12-L1: There is a broad  disc bulge with a superimposed ascending right paracentral disc extrusion which extends approximately 10 mm above the disc plane.  There is mild-moderate central canal stenosis present.  There is mild right neuroforaminal stenosis.  L1-L2: There is a broad disc bulge throughout the period there is ligamentum flavum thickening.  The central canal is obscured.  There is a suggestion of a possible right paracentral disc protrusion.  There is mild-moderate overall central canal stenosis.  The neural foramina are not assessed secondary to metal artifact.  L2-L3: No central canal or neuroforaminal stenosis. No disc protrusion or extrusion.  L3-L4: No central canal or neuroforaminal stenosis. No disc protrusion or extrusion.  L4-L5: The left neural foramen is not optimally evaluated due to metal artifact.  No central canal stenosis or right neuroforaminal stenosis.  L5-S1: There is a grade I anterolisthesis of L5 on S1 with associated uncovering of the intervertebral disc.  No definite central canal stenosis or neuroforaminal stenosis.    11/27/20 MRI L spine  T12-L1: Mild disc osteophyte complex.  Prominent epidural fat.  Mild right and minimal left facet hypertrophy.  Compression of the thecal sac with minimal fluid surrounding the conus just above the T12 level.  This has progressed from prior study on 02/01/2017.  Moderate right and mild left foraminal stenosis.   L1-L2: Retrolisthesis.  Prominent epidural fat.  Spinal canal is somewhat obscured by artifact from hardware.  There appears to be moderate right and at least mild left narrowing of the lateral recesses with mild spinal canal stenosis.  This is progressed from prior study.  Foramina are partially obscured by artifact from hardware but there appears to be mild bilateral foraminal stenosis.   L2-L3: Fused.  No significant spinal canal stenosis.  Likely minimal bilateral foraminal stenosis.   L3-L4: Fused.  No significant spinal canal or foraminal stenosis.    L4-L5: Fused.  No significant spinal canal stenosis.  Foramina are partially obscured.  There appears to be mild-moderate right foraminal stenosis.   L5-S1: Fused.  Anterolisthesis.  Spinal canal is partially obscured but there appears to be no significant spinal canal stenosis.  Likely mild bilateral foraminal stenosis.    5/25/20 MRI C spine  There is multilevel cervical spondylosis.  Straightening of the cervical spine.  Disc space narrowing greatest at the C5-C6 level.  No evidence of congenital spinal stenosis.  No evidence of abnormal signal change involving the visualized portion of the spinal cord.   At the C2-C3 and C3-C4 levels there is no evidence of a focal disc abnormality.  There are facet degenerative changes on the right at C3-C4.  No significant spinal canal or foraminal encroachment.   At the C4-C5 level there is a posterior disc bulge/osteophyte with mild effacement along the anterior margin of the subarachnoid space.  Facet degenerative changes greater on the right than the left.  Mild right greater than left foraminal encroachment.   At the C5-C6 level there is a posterior and right paracentral disc-osteophyte with effacement along the anterior subarachnoid space and spinal cord.  There are degenerative changes of the facets bilaterally resulting in a moderate degree of acquired spinal stenosis and foraminal encroachment.   At the C6-C7 level there is a minimal disc bulge/osteophyte without significant spinal canal or foraminal encroachment.   At the C7-T1 level there is a central disc-osteophyte mildly prominent to the right of midline with effacement along the anterior subarachnoid space.  No foraminal encroachment.       Assessment:  The patient is a 62-year-old woman with a history of hypertension, diabetes, multiple sclerosis, lumbar postlaminectomy syndrome who presents in self-referral for continued low back pain and bilateral leg pain, neck pain.   1. Lumbar radiculopathy  Place in  Outpatient    Vital signs    Verify informed consent    Notify physician     Notify physician     Notify physician (specify)    Diet NPO    alprazolam ODT dissolvable tablet 1 mg    Case Request Operating Room: Injection,steroid,epidural,transforaminal approach L1/2      2. DDD (degenerative disc disease), lumbar        3. Postlaminectomy syndrome of lumbar region        4. DDD (degenerative disc disease), cervical  MRI Cervical Spine Without Contrast      5. Cervical stenosis of spinal canal        6. Chronic pain of both shoulders        7. Chronic pain of left knee        8. Opioid contract exists  Pain [facility] Drug Screen                                                                                                                                                                                                  Plan:    1.  For her low back and leg pain I will schedule her to repeat bilateral L1/2 transforaminal epidural steroid injections.  She has done well with this in the past.    2.  She was scheduled for cervical spine surgery over 1 year ago and this was canceled due to lab abnormalities.  I am going to update her cervical spine MRI and have her follow up with Neurosurgery to discuss.    3.  Dr. Seaman provided prescriptions for hydrocodone -acetaminophen 10/325 mg 4 times daily as needed for pain.  She requested to increase or change her pain medication today and I reviewed this with Dr. Seaman.  We do not suggest increasing or changing opioids as she will only developed further tolerance.  Urine drug screen was collected today.  She was due for her medication 2 days ago and states that she took Percocet from her daughter-in-law since she was out of hydrocodone.  I have reviewed the [place] Board of Pharmacy website and there are no abberancies.    4.  Follow-up in 4 weeks postprocedure or sooner as needed.    The total time spent for evaluation and management on 01/11/2023 including reviewing  separately obtained history, performing a medically appropriate exam and evaluation, documenting clinical information in the health record, independently interpreting results and communicating them to the patient/family/caregiver, and ordering medications/tests/procedures was between 40-54 minutes.

## 2023-01-17 LAB
6MAM UR QL: NOT DETECTED
7AMINOCLONAZEPAM UR QL: PRESENT
A-OH ALPRAZ UR QL: NOT DETECTED
ALPHA-OH-MIDAZOLAM: NOT DETECTED
ALPRAZ UR QL: NOT DETECTED
AMPHET UR QL SCN: NOT DETECTED
ANNOTATION COMMENT IMP: NORMAL
ANNOTATION COMMENT IMP: NORMAL
BARBITURATES UR QL: NOT DETECTED
BUPRENORPHINE UR QL: NOT DETECTED
BZE UR QL: NOT DETECTED
CARBOXYTHC UR QL: NOT DETECTED
CARISOPRODOL UR QL: NOT DETECTED
CLONAZEPAM UR QL: NOT DETECTED
CODEINE UR QL: NOT DETECTED
CREAT UR-MCNC: 130.6 MG/DL (ref 20–400)
DIAZEPAM UR QL: NOT DETECTED
ETHYL GLUCURONIDE UR QL: NOT DETECTED
FENTANYL UR QL: NOT DETECTED
GABAPENTIN: NOT DETECTED
HYDROCODONE UR QL: PRESENT
HYDROMORPHONE UR QL: PRESENT
LORAZEPAM UR QL: NOT DETECTED
MDA UR QL: NOT DETECTED
MDEA UR QL: NOT DETECTED
MDMA UR QL: NOT DETECTED
ME-PHENIDATE UR QL: NOT DETECTED
METHADONE UR QL: NOT DETECTED
METHAMPHET UR QL: NOT DETECTED
MIDAZOLAM UR QL SCN: NOT DETECTED
MORPHINE UR QL: NOT DETECTED
NALOXONE: NOT DETECTED
NORBUPRENORPHINE UR QL CFM: NOT DETECTED
NORDIAZEPAM UR QL: NOT DETECTED
NORFENTANYL UR QL: NOT DETECTED
NORHYDROCODONE UR QL CFM: PRESENT
NORMEPERIDINE UR QL CFM: NOT DETECTED
NOROXYCODONE UR QL CFM: NOT DETECTED
NOROXYMORPHONE UR QL SCN: NOT DETECTED
OXAZEPAM UR QL: NOT DETECTED
OXYCODONE UR QL: NOT DETECTED
OXYMORPHONE UR QL: NOT DETECTED
PATHOLOGY STUDY: NORMAL
PCP UR QL: NOT DETECTED
PHENTERMINE UR QL: NOT DETECTED
PREGABALIN: PRESENT
SERVICE CMNT-IMP: NORMAL
TAPENTADOL UR QL SCN: NOT DETECTED
TAPENTADOL UR QL SCN: NOT DETECTED
TEMAZEPAM UR QL: NOT DETECTED
TRAMADOL UR QL: NOT DETECTED
ZOLPIDEM METABOLITE: NOT DETECTED
ZOLPIDEM UR QL: NOT DETECTED

## 2023-01-19 ENCOUNTER — TELEPHONE (OUTPATIENT)
Dept: PAIN MEDICINE | Facility: CLINIC | Age: 63
End: 2023-01-19
Payer: MEDICARE

## 2023-01-23 ENCOUNTER — PATIENT MESSAGE (OUTPATIENT)
Dept: PAIN MEDICINE | Facility: CLINIC | Age: 63
End: 2023-01-23
Payer: MEDICARE

## 2023-01-23 RX ORDER — ALPRAZOLAM 1 MG/1
TABLET ORAL
Qty: 2 TABLET | Refills: 0 | Status: SHIPPED | OUTPATIENT
Start: 2023-01-23 | End: 2023-10-11

## 2023-01-23 NOTE — TELEPHONE ENCOUNTER
Changed pharmacy for today  Xanex was ordered 01/11 but not to a specific pharmacy. Please reorder

## 2023-01-27 ENCOUNTER — HOSPITAL ENCOUNTER (OUTPATIENT)
Dept: RADIOLOGY | Facility: HOSPITAL | Age: 63
Discharge: HOME | End: 2023-01-27
Attending: PHYSICIAN ASSISTANT
Payer: MEDICARE

## 2023-01-27 ENCOUNTER — HOSPITAL ENCOUNTER (OUTPATIENT)
Dept: RADIOLOGY | Facility: HOSPITAL | Age: 63
Discharge: HOME | End: 2023-01-27
Attending: ANESTHESIOLOGY | Admitting: ANESTHESIOLOGY
Payer: MEDICARE

## 2023-01-27 ENCOUNTER — HOSPITAL ENCOUNTER (OUTPATIENT)
Facility: HOSPITAL | Age: 63
Discharge: HOME | End: 2023-01-27
Attending: ANESTHESIOLOGY | Admitting: ANESTHESIOLOGY
Payer: MEDICARE

## 2023-01-27 VITALS
OXYGEN SATURATION: 96 % | HEART RATE: 73 BPM | WEIGHT: 198 LBS | BODY MASS INDEX: 31.82 KG/M2 | TEMPERATURE: 98 F | HEIGHT: 66 IN | DIASTOLIC BLOOD PRESSURE: 69 MMHG | RESPIRATION RATE: 16 BRPM | SYSTOLIC BLOOD PRESSURE: 143 MMHG

## 2023-01-27 DIAGNOSIS — M54.16 LUMBAR RADICULOPATHY: ICD-10-CM

## 2023-01-27 DIAGNOSIS — M54.50 LOWER BACK PAIN: ICD-10-CM

## 2023-01-27 DIAGNOSIS — M50.30 DDD (DEGENERATIVE DISC DISEASE), CERVICAL: ICD-10-CM

## 2023-01-27 LAB — GLUCOSE SERPL-MCNC: 89 MG/DL (ref 70–110)

## 2023-01-27 PROCEDURE — 82962 GLUCOSE BLOOD TEST: CPT | Mod: PO | Performed by: ANESTHESIOLOGY

## 2023-01-27 PROCEDURE — 25000003 PHARM REV CODE 250: Mod: PO | Performed by: ANESTHESIOLOGY

## 2023-01-27 PROCEDURE — 64483 PR EPIDURAL INJ, ANES/STEROID, TRANSFORAMINAL, LUMB/SACR, SNGL LEVL: ICD-10-PCS | Mod: 50,,, | Performed by: ANESTHESIOLOGY

## 2023-01-27 PROCEDURE — 72141 MRI NECK SPINE W/O DYE: CPT | Mod: 26,,, | Performed by: RADIOLOGY

## 2023-01-27 PROCEDURE — 64483 NJX AA&/STRD TFRM EPI L/S 1: CPT | Mod: 50,,, | Performed by: ANESTHESIOLOGY

## 2023-01-27 PROCEDURE — 63600175 PHARM REV CODE 636 W HCPCS: Mod: PO | Performed by: ANESTHESIOLOGY

## 2023-01-27 PROCEDURE — 72141 MRI NECK SPINE W/O DYE: CPT | Mod: TC,PO

## 2023-01-27 PROCEDURE — 64483 NJX AA&/STRD TFRM EPI L/S 1: CPT | Mod: PO | Performed by: ANESTHESIOLOGY

## 2023-01-27 PROCEDURE — 25500020 PHARM REV CODE 255: Mod: PO | Performed by: ANESTHESIOLOGY

## 2023-01-27 PROCEDURE — 76000 FLUOROSCOPY <1 HR PHYS/QHP: CPT | Mod: TC,PO

## 2023-01-27 PROCEDURE — 72141 MRI CERVICAL SPINE WITHOUT CONTRAST: ICD-10-PCS | Mod: 26,,, | Performed by: RADIOLOGY

## 2023-01-27 RX ORDER — LIDOCAINE HYDROCHLORIDE 10 MG/ML
INJECTION, SOLUTION EPIDURAL; INFILTRATION; INTRACAUDAL; PERINEURAL
Status: DISCONTINUED | OUTPATIENT
Start: 2023-01-27 | End: 2023-01-27 | Stop reason: HOSPADM

## 2023-01-27 RX ORDER — ALPRAZOLAM 0.5 MG/1
1 TABLET, ORALLY DISINTEGRATING ORAL ONCE AS NEEDED
Status: DISCONTINUED | OUTPATIENT
Start: 2023-01-27 | End: 2023-01-27 | Stop reason: HOSPADM

## 2023-01-27 RX ORDER — BUPIVACAINE HYDROCHLORIDE 2.5 MG/ML
INJECTION, SOLUTION EPIDURAL; INFILTRATION; INTRACAUDAL
Status: DISCONTINUED | OUTPATIENT
Start: 2023-01-27 | End: 2023-01-27 | Stop reason: HOSPADM

## 2023-01-27 RX ORDER — METHYLPREDNISOLONE ACETATE 80 MG/ML
INJECTION, SUSPENSION INTRA-ARTICULAR; INTRALESIONAL; INTRAMUSCULAR; SOFT TISSUE
Status: DISCONTINUED | OUTPATIENT
Start: 2023-01-27 | End: 2023-01-27 | Stop reason: HOSPADM

## 2023-01-27 NOTE — OP NOTE
PROCEDURE DATE: 1/27/2023    PROCEDURE: Bilateral L1/2 transforaminal epidural steroid injection under fluoroscopy    DIAGNOSIS: Lumbar  Radiculopathy    Post op diagnosis: Same    PHYSICIAN: Jeffry Seaman MD    MEDICATIONS INJECTED:  Methylprednisolone 40mg (1ml) and 1ml 0.25% bupivicaine at each nerve root.     LOCAL ANESTHETIC INJECTED:  Lidocaine 1%. 4 ml per site.    SEDATION MEDICATIONS: none    ESTIMATED BLOOD LOSS:  none    COMPLICATIONS:  none    TECHNIQUE:   A time-out was taken to identify patient and procedure side prior to starting the procedure. The patient was placed in a prone position, prepped and draped in the usual sterile fashion using ChloraPrep and sterile towels.  The area to be injected was determined under fluoroscopic guidance in AP and oblique view.  Local anesthetic was given by raising a wheal and going down to the hub of a 25-gauge 1.5 inch needle.  In oblique view, a 3.5 inch 22-gauge bent-tip spinal needle was introduced towards 6 oclock position of the pedicle of each above named nerve root level.  The needle was walked medially then hinged into the neural foramen and position was confirmed in AP and lateral views.  Omnipaque contrast dye was injected to confirm appropriate placement and that there was no vascular uptake.  After negative aspiration for blood or CSF, the medication was then injected. This was performed at the right and then left L1/2 level(s). The patient tolerated the procedure well.    The patient was monitored after the procedure.  Patient was given post procedure and discharge instructions to follow at home. The patient was discharged in a stable condition.

## 2023-01-27 NOTE — INTERVAL H&P NOTE
The patient has been examined and the H&P has been reviewed:    I concur with the findings and no changes have occurred since H&P was written.    Procedure risks, benefits and alternative options discussed and understood by patient/family.    ASA 3, mallampati 2        There are no hospital problems to display for this patient.

## 2023-01-27 NOTE — DISCHARGE SUMMARY
[facility] - Surgery  Discharge Note  Short Stay    Procedure(s) (LRB):  Injection,steroid,epidural,transforaminal approach L1/2 (Bilateral)      OUTCOME: Patient tolerated treatment/procedure well without complication and is now ready for discharge.    DISPOSITION: Home or Self Care    FINAL DIAGNOSIS:  Lumbar radiculopathy    FOLLOWUP: In clinic    DISCHARGE INSTRUCTIONS:    Discharge Procedure Orders   Diet Adult Regular     No dressing needed     Notify your health care provider if you experience any of the following:  temperature >100.4     Activity as tolerated

## 2023-01-27 NOTE — PROGRESS NOTES
Patient signed surgical consent and then after stated she took 1 mg of xanax at 10am before MRI. Daughter in law signed consent as a witness.

## 2023-02-07 ENCOUNTER — PATIENT MESSAGE (OUTPATIENT)
Dept: PAIN MEDICINE | Facility: CLINIC | Age: 63
End: 2023-02-07
Payer: MEDICARE

## 2023-02-09 ENCOUNTER — PATIENT MESSAGE (OUTPATIENT)
Dept: NEUROSURGERY | Facility: CLINIC | Age: 63
End: 2023-02-09
Payer: MEDICARE

## 2023-02-09 ENCOUNTER — PATIENT MESSAGE (OUTPATIENT)
Dept: PAIN MEDICINE | Facility: CLINIC | Age: 63
End: 2023-02-09
Payer: MEDICARE

## 2023-02-10 ENCOUNTER — PATIENT MESSAGE (OUTPATIENT)
Dept: PAIN MEDICINE | Facility: CLINIC | Age: 63
End: 2023-02-10
Payer: MEDICARE

## 2023-02-10 RX ORDER — HYDROCODONE BITARTRATE AND ACETAMINOPHEN 10; 325 MG/1; MG/1
1 TABLET ORAL EVERY 6 HOURS PRN
Qty: 120 TABLET | Refills: 0 | Status: SHIPPED | OUTPATIENT
Start: 2023-02-10 | End: 2023-03-12

## 2023-02-20 ENCOUNTER — PATIENT MESSAGE (OUTPATIENT)
Dept: PSYCHIATRY | Facility: CLINIC | Age: 63
End: 2023-02-20
Payer: MEDICARE

## 2023-02-27 ENCOUNTER — PATIENT MESSAGE (OUTPATIENT)
Dept: PAIN MEDICINE | Facility: CLINIC | Age: 63
End: 2023-02-27
Payer: MEDICARE

## 2023-03-03 ENCOUNTER — PATIENT MESSAGE (OUTPATIENT)
Dept: PAIN MEDICINE | Facility: CLINIC | Age: 63
End: 2023-03-03
Payer: MEDICARE

## 2023-03-03 RX ORDER — HYDROCODONE BITARTRATE AND ACETAMINOPHEN 10; 325 MG/1; MG/1
1 TABLET ORAL EVERY 6 HOURS PRN
Qty: 120 TABLET | Refills: 0 | Status: SHIPPED | OUTPATIENT
Start: 2023-03-11 | End: 2023-04-03 | Stop reason: SDUPTHER

## 2023-03-08 NOTE — TELEPHONE ENCOUNTER
Spoke with patient and rescheduled appointment, also let her know her medication should be filled on the 11th.

## 2023-04-03 ENCOUNTER — OFFICE VISIT (OUTPATIENT)
Dept: PAIN MEDICINE | Facility: CLINIC | Age: 63
End: 2023-04-03
Payer: MEDICARE

## 2023-04-03 DIAGNOSIS — E87.1 HYPONATREMIA: ICD-10-CM

## 2023-04-03 DIAGNOSIS — M51.36 DDD (DEGENERATIVE DISC DISEASE), LUMBAR: ICD-10-CM

## 2023-04-03 DIAGNOSIS — M50.30 DDD (DEGENERATIVE DISC DISEASE), CERVICAL: ICD-10-CM

## 2023-04-03 DIAGNOSIS — G35 MULTIPLE SCLEROSIS: ICD-10-CM

## 2023-04-03 DIAGNOSIS — M25.551 RIGHT HIP PAIN: Primary | ICD-10-CM

## 2023-04-03 PROCEDURE — 99213 OFFICE O/P EST LOW 20 MIN: CPT | Mod: 95,,, | Performed by: ANESTHESIOLOGY

## 2023-04-03 PROCEDURE — 99213 PR OFFICE/OUTPT VISIT, EST, LEVL III, 20-29 MIN: ICD-10-PCS | Mod: 95,,, | Performed by: ANESTHESIOLOGY

## 2023-04-03 RX ORDER — HYDROCODONE BITARTRATE AND ACETAMINOPHEN 10; 325 MG/1; MG/1
1 TABLET ORAL EVERY 6 HOURS PRN
Qty: 120 TABLET | Refills: 0 | Status: SHIPPED | OUTPATIENT
Start: 2023-04-10 | End: 2023-05-10

## 2023-04-03 RX ORDER — HYDROCODONE BITARTRATE AND ACETAMINOPHEN 10; 325 MG/1; MG/1
1 TABLET ORAL EVERY 6 HOURS PRN
Qty: 120 TABLET | Refills: 0 | Status: SHIPPED | OUTPATIENT
Start: 2023-05-10 | End: 2023-06-08 | Stop reason: SDUPTHER

## 2023-04-03 NOTE — PROGRESS NOTES
This note was completed with dictation software and grammatical errors may exist.    The patient location is: [place], [place]  The chief complaint leading to consultation is: Neck, back pain    Visit type: audiovisual    Face to Face time with patient: 16 minutes  23 minutes of total time spent on the encounter, which includes face to face time and non-face to face time preparing to see the patient (eg, review of tests), Obtaining and/or reviewing separately obtained history, Documenting clinical information in the electronic or other health record, Independently interpreting results (not separately reported) and communicating results to the patient/family/caregiver, or Care coordination (not separately reported).         Each patient to whom he or she provides medical services by telemedicine is:  (1) informed of the relationship between the physician and patient and the respective role of any other health care provider with respect to management of the patient; and (2) notified that he or she may decline to receive medical services by telemedicine and may withdraw from such care at any time.    Notes:       CC: Neck and bilateral arm pain, back pain, knee pain     HPI: The patient is a 62-year-old woman with a history of hypertension, diabetes, multiple sclerosis, lumbar postlaminectomy syndrome who presents in self-referral for continued low back pain and bilateral leg pain, neck pain.  She is status post bilateral L1/2 TF ROBIN on 01/27/2023 with no major benefit.  She continues to complain of back pain, pain into her bilateral arms and hands.  What seems to be worse at this point however though his back pain at the waist and into the right hip.  She states that she can hardly walk due to the pain.  She states that the pain in her hip has over taking her arm or shoulder pain or neck pain.  She states that the hydrocodone does not seem to help as much.  She continues to have issues with low sodium, states that she has  spoken to her primary care physician about this who has told her to drink less fluid.  She drinks a 12 pack of diet Coke per day.    Pain intervention history: She has been followed by one of our previous [facility] pain management physicians, Dr. Chapa in [place] until recently.  She had undergone fusion surgery in 1983 and again surgery in 1993 for her lumbar spine.  She had undergone numerous lumbar spine injections in the past with good relief.  She has tried gabapentin with only minimal relief but states that Lyrica works best.  Sounds like she had a spinal cord stimulator trial in the past that did not help.  She has been taking hydrocodone up to 4 times a day for several years.  She is status post caudal epidural steroid injection on 8/6/14 with 60% relief.  She is status post caudal epidural steroid injection on 5/13/15 with 0% relief.  She is status post caudal epidural steroid injection on 10/2/15 with 0% relief.  She is status post C7-T1 cervical interlaminar epidural steroid injection on 9/7/16 with 100% relief of her bilateral arm pain and mild relief of her neck pain.  She is status post C7-T1 cervical interlaminar epidural steroid injection on 2/21/17 with at least 50% relief.  She is status post bilateral L1 transforaminal epidural steroid injections on 3/20/17 with 75% relief. She is status post C7-T1 cervical interlaminar epidural steroid injection on 12/22/17 and again on 2/9/18 with 90% relief of her neck and arm pain.  She is status post bilateral L1/2 transforaminal epidural steroid injections on 08/08/2018 with almost 100% relief of her low back pain.She is status post bilateral L1/2 transforaminal epidural steroid injections on 12/08/2020 with about 60% relief. She is status post C7-T1 interlaminar epidural steroid injection on 08/03/2021 with 0% relief.  She is status post bilateral L1/2 TF ROBIN on 01/27/2023 with no major benefit.    Spine surgeries: She had undergone fusion surgery in 1983  and again surgery in  for her lumbar spine.     Antineuropathics:  Lyrica 75 in the morning and 150 at night  NSAIDs:  Physical therapy:  Antidepressants:  Cymbalta 60 daily, venlafaxine 225  Muscle relaxers:  Opioids:  Hydrocodone 10/325 4 times daily  Antiplatelets/Anticoagulants:    ROS:She reports urinary frequency, joint stiffness, joint swelling, back pain, memory loss, anxiety, depression, difficulty sleeping and loss of balance.  Balance of review of systems is negative.    Medical, surgical, family and social history reviewed elsewhere in record.    Medications/Allergies: See med card    There were no vitals filed for this visit.        Physical exam:  Gen: A and O x3, pleasant, well-groomed    Imagin20 MRI L spine  T12-L1: Mild disc osteophyte complex.  Prominent epidural fat.  Mild right and minimal left facet hypertrophy.  Compression of the thecal sac with minimal fluid surrounding the conus just above the T12 level.  This has progressed from prior study on 2017.  Moderate right and mild left foraminal stenosis.   L1-L2: Retrolisthesis.  Prominent epidural fat.  Spinal canal is somewhat obscured by artifact from hardware.  There appears to be moderate right and at least mild left narrowing of the lateral recesses with mild spinal canal stenosis.  This is progressed from prior study.  Foramina are partially obscured by artifact from hardware but there appears to be mild bilateral foraminal stenosis.   L2-L3: Fused.  No significant spinal canal stenosis.  Likely minimal bilateral foraminal stenosis.   L3-L4: Fused.  No significant spinal canal or foraminal stenosis.   L4-L5: Fused.  No significant spinal canal stenosis.  Foramina are partially obscured.  There appears to be mild-moderate right foraminal stenosis.   L5-S1: Fused.  Anterolisthesis.  Spinal canal is partially obscured but there appears to be no significant spinal canal stenosis.  Likely mild bilateral foraminal  stenosis.    1/27/23 MRI C-spine:  Morphology: Vertebral body heights are preserved and marrow signal is within normal limits aside from minimal mixed fibrovascular and fatty degenerative endplate changes at C5-C6 in the setting of degenerative disc height loss discussed in further detail below..   Alignment: Redemonstrated grade 1 anterolisthesis of C3 on C4 and retrolisthesis of C5 on C6..   Cord: The cervical cord demonstrates normal signal intensity throughout its length.  Similar flattening of the right ventral cord at C5-C6..   Craniocervical Junction: Cerebellar tonsils are normally positioned. The visualized portions of the posterior fossa are unremarkable. The regional osseous anatomy is normal.   Disc levels:   C2-C3: No disc herniation.  The spinal canal is patent.  Uncovertebral spurring and facet arthrosis produce mild right foraminal narrowing.  The left foramen is patent.  These findings are unchanged..   C3-C4: Spondylolisthesis and shallow disc osteophyte complex with ligamentum flavum thickening producing mild to moderate narrowing of the spinal canal.  Facet arthrosis contributes to severe right and moderate left foraminal narrowing similar to the prior exam..   C4-C5: Shallow broad-based disc osteophyte complex producing mild narrowing of the spinal canal.  Uncovertebral spurring and facet arthrosis contribute to mild-to-moderate right and mild left foraminal narrowing similar to the prior exam..   C5-C6: Moderate to severe degenerative disc height loss and broad-based disc osteophyte complex lateralizing to the right with ligamentum flavum thickening producing moderate to severe narrowing of the spinal canal also asymmetric to the right with cord flattening.  Uncovertebral spurring and facet arthrosis produce severe bilateral foraminal narrowing similar to the prior exam..   C6-C7: Shallow central disc osteophyte complex contributing to mild to moderate narrowing of the spinal canal.   Uncovertebral spurring and facet arthrosis produces moderate right and mild left foraminal narrowing.  These findings are unchanged..   C7-T1: Shallow central disc osteophyte complex minimally narrowing the central spinal canal.  Mild facet arthrosis bilaterally.  The foramina are patent..    Assessment:  The patient is a 62-year-old woman with a history of hypertension, diabetes, multiple sclerosis, lumbar postlaminectomy syndrome who presents in self-referral for continued low back pain and bilateral leg pain, neck pain.   1. Right hip pain  X-Ray Hip 2 or 3 views Right (with Pelvis when performed)      2. DDD (degenerative disc disease), cervical        3. DDD (degenerative disc disease), lumbar        4. Multiple sclerosis        5. Hyponatremia                                                                                                                                                                                                    Plan:  1.  She is still having severe back and hip pain, unclear how much is actually related to the hip joint, I suspect this is more coming from her lumbar spine.  I will have her get a right hip x-ray and I will call her with the results.  We may set her up with an interlaminar L1/2 or T12/L1 injection.  2.  She is still having issues with hyponatremia, she is going to get with her primary care physician again to address this.  She is going to need to have cervical surgery in the future but not tell the hyponatremia has resolved.  3.  I have refilled her hydrocodone. [place] Board of Pharmacy website checked and no aberrant patterns noted.    The total time spent for evaluation and management today including reviewing separately obtained history, performing a medically appropriate exam and evaluation, documenting clinical information in the health record, independently interpreting results and communicating them to the patient/family/caregiver, and ordering  medications/tests/procedures was between 20-29 minutes.

## 2023-04-04 ENCOUNTER — HOSPITAL ENCOUNTER (OUTPATIENT)
Dept: RADIOLOGY | Facility: HOSPITAL | Age: 63
Discharge: HOME | End: 2023-04-04
Attending: ANESTHESIOLOGY
Payer: MEDICARE

## 2023-04-04 DIAGNOSIS — M25.551 RIGHT HIP PAIN: ICD-10-CM

## 2023-04-04 PROCEDURE — 73502 X-RAY EXAM HIP UNI 2-3 VIEWS: CPT | Mod: TC,RT

## 2023-04-17 ENCOUNTER — TELEPHONE (OUTPATIENT)
Dept: PAIN MEDICINE | Facility: CLINIC | Age: 63
End: 2023-04-17

## 2023-04-17 NOTE — TELEPHONE ENCOUNTER
Please let the patient know that I reviewed the x-ray of her hip and she does have fairly significant degeneration in her actual hip joint.  I would like her to see 1 of our orthopedic physicians, see if there is somebody in her area that she can see.  I can then place a referral

## 2023-04-21 ENCOUNTER — TELEPHONE (OUTPATIENT)
Dept: PAIN MEDICINE | Facility: CLINIC | Age: 63
End: 2023-04-21
Payer: MEDICARE

## 2023-04-21 DIAGNOSIS — M25.551 RIGHT HIP PAIN: Primary | ICD-10-CM

## 2023-04-21 NOTE — TELEPHONE ENCOUNTER
Called to provide patient with results from x-ray provided from dr. Seaman. Patient states there no orthopedic doctor out her way, but she can to an orthopedic doctor here [facility] or [facility] either location would be fine.

## 2023-04-27 ENCOUNTER — TELEPHONE (OUTPATIENT)
Dept: ORTHOPEDICS | Facility: CLINIC | Age: 63
End: 2023-04-27
Payer: MEDICARE

## 2023-05-01 RX ORDER — METHOCARBAMOL 750 MG/1
TABLET, FILM COATED ORAL
Qty: 40 TABLET | Refills: 2 | Status: SHIPPED | OUTPATIENT
Start: 2023-05-01 | End: 2023-09-07

## 2023-06-08 ENCOUNTER — PATIENT MESSAGE (OUTPATIENT)
Dept: PAIN MEDICINE | Facility: CLINIC | Age: 63
End: 2023-06-08
Payer: MEDICARE

## 2023-06-08 RX ORDER — HYDROCODONE BITARTRATE AND ACETAMINOPHEN 10; 325 MG/1; MG/1
1 TABLET ORAL EVERY 6 HOURS PRN
Qty: 120 TABLET | Refills: 0 | Status: SHIPPED | OUTPATIENT
Start: 2023-06-09 | End: 2023-07-07 | Stop reason: SDUPTHER

## 2023-07-05 ENCOUNTER — PATIENT MESSAGE (OUTPATIENT)
Dept: PAIN MEDICINE | Facility: CLINIC | Age: 63
End: 2023-07-05
Payer: MEDICARE

## 2023-07-06 ENCOUNTER — PATIENT MESSAGE (OUTPATIENT)
Dept: PAIN MEDICINE | Facility: CLINIC | Age: 63
End: 2023-07-06
Payer: MEDICARE

## 2023-07-07 ENCOUNTER — PATIENT MESSAGE (OUTPATIENT)
Dept: PAIN MEDICINE | Facility: CLINIC | Age: 63
End: 2023-07-07
Payer: MEDICARE

## 2023-07-07 ENCOUNTER — TELEPHONE (OUTPATIENT)
Dept: PAIN MEDICINE | Facility: CLINIC | Age: 63
End: 2023-07-07
Payer: MEDICARE

## 2023-07-07 RX ORDER — HYDROCODONE BITARTRATE AND ACETAMINOPHEN 10; 325 MG/1; MG/1
1 TABLET ORAL EVERY 6 HOURS PRN
Qty: 120 TABLET | Refills: 0 | Status: SHIPPED | OUTPATIENT
Start: 2023-07-09 | End: 2023-08-09 | Stop reason: SDUPTHER

## 2023-07-07 NOTE — TELEPHONE ENCOUNTER
----- Message from Meghan Holguin sent at 7/7/2023 10:34 AM CDT -----  Regarding: refill  Contact: patient  Type:  RX Refill Request    Who Called:  patient  Refill or New Rx:  refill  RX Name and Strength:  HYDROcodone-acetaminophen (NORCO)  mg per tablet  How is the patient currently taking it? (ex. 1XDay):  as directed  Is this a 30 day or 90 day RX:  90  Preferred Pharmacy with phone number:    [address] [address]  Phone: [phone] Fax: [phone]  Local or Mail Order:  local  Ordering Provider:  Dr. Jurgen Tapia Call Back Number:  [phone]   Additional Information:  Please call patient to advise.  Thanks!

## 2023-07-08 NOTE — TELEPHONE ENCOUNTER
Is there anyway you could date her hydrocodone for today 7/8 as the pharmacy is closed on Sunday? I informed her that you may not see this message today.

## 2023-07-21 ENCOUNTER — PATIENT MESSAGE (OUTPATIENT)
Dept: PSYCHIATRY | Facility: CLINIC | Age: 63
End: 2023-07-21
Payer: MEDICARE

## 2023-07-27 NOTE — TELEPHONE ENCOUNTER
Pharmacist at [facility] says they do not have this script please resign    [employer]

## 2023-08-07 RX ORDER — HYDROCODONE BITARTRATE AND ACETAMINOPHEN 10; 325 MG/1; MG/1
1 TABLET ORAL EVERY 6 HOURS PRN
Qty: 120 TABLET | Refills: 0 | Status: CANCELLED | OUTPATIENT
Start: 2023-08-07 | End: 2023-09-06

## 2023-08-07 NOTE — TELEPHONE ENCOUNTER
What is this message?  This was in the refill request box but it did not have a message stating what medication was requested nor did it have a medication pending.  It appears that you canceled something.

## 2023-08-09 RX ORDER — HYDROCODONE BITARTRATE AND ACETAMINOPHEN 10; 325 MG/1; MG/1
1 TABLET ORAL EVERY 6 HOURS PRN
Qty: 120 TABLET | Refills: 0 | Status: SHIPPED | OUTPATIENT
Start: 2023-08-09 | End: 2023-09-13

## 2023-09-07 RX ORDER — PREGABALIN 75 MG/1
CAPSULE ORAL
Qty: 90 CAPSULE | Refills: 2 | Status: SHIPPED | OUTPATIENT
Start: 2023-09-07 | End: 2024-01-08

## 2023-09-08 ENCOUNTER — PATIENT MESSAGE (OUTPATIENT)
Dept: PAIN MEDICINE | Facility: CLINIC | Age: 63
End: 2023-09-08
Payer: MEDICARE

## 2023-09-11 ENCOUNTER — PATIENT MESSAGE (OUTPATIENT)
Dept: PAIN MEDICINE | Facility: CLINIC | Age: 63
End: 2023-09-11
Payer: MEDICARE

## 2023-09-12 ENCOUNTER — PATIENT MESSAGE (OUTPATIENT)
Dept: PAIN MEDICINE | Facility: CLINIC | Age: 63
End: 2023-09-12
Payer: MEDICARE

## 2023-09-12 NOTE — TELEPHONE ENCOUNTER
----- Message from Michelle Christy sent at 9/12/2023  8:47 AM CDT -----  Contact: pt  Type:  RX Refill Request    Who Called: pt  Refill or New Rx:refill  RX Name and Strength:Hydrocodone  How is the patient currently taking it? (ex. 1XDay):as directed  Is this a 30 day or 90 day RX:30  Preferred Pharmacy with phone number:  [address] [address]  Phone: [phone] Fax: [phone]  Local or Mail Order:local  Ordering Provider:Jurgen    Would the patient rather a call back or a response via [facility]? CALL BACK    Best Call Back Number:[phone]    Additional Information: Pt has called in several times about this and was told the script was sent to pharmacy. IT HAS NOT, and the medication is no longer listed in her chart. ALSO, no one is answering her messages.    Pt would like to know what is going on.    PLEASE CALL TO ADVISE  Thanks      Teamsed Supervisor about this also.

## 2023-09-12 NOTE — TELEPHONE ENCOUNTER
----- Message from Annie Escobar sent at 9/11/2023 11:09 AM CDT -----  Type:  RX Refill Request    Who Called: pt    Refill or New Rx:refill    RX Name and Strength:hydro codone     How is the patient currently taking it? (ex. 1XDay):as directed    Is this a 30 day or 90 day RX:90    Preferred Pharmacy with phone number:  [address] [address]  Phone: [phone] Fax: [phone]    Would the patient rather a call back or a response via [facility]? Call back    Best Call Back Number:[phone]      Additional Information: said the pharmacy told her it was not sent however she had a nurse tell her it was sent over last friday      Please call Back to advise. Thanks!

## 2023-09-13 RX ORDER — HYDROCODONE BITARTRATE AND ACETAMINOPHEN 10; 325 MG/1; MG/1
1 TABLET ORAL EVERY 6 HOURS PRN
Qty: 120 TABLET | Refills: 0 | Status: SHIPPED | OUTPATIENT
Start: 2023-09-13 | End: 2023-10-11 | Stop reason: SDUPTHER

## 2023-10-10 ENCOUNTER — PATIENT MESSAGE (OUTPATIENT)
Dept: PAIN MEDICINE | Facility: CLINIC | Age: 63
End: 2023-10-10
Payer: MEDICARE

## 2023-10-11 RX ORDER — HYDROCODONE BITARTRATE AND ACETAMINOPHEN 10; 325 MG/1; MG/1
1 TABLET ORAL EVERY 6 HOURS PRN
Qty: 120 TABLET | Refills: 0 | Status: SHIPPED | OUTPATIENT
Start: 2023-10-13 | End: 2023-11-08 | Stop reason: SDUPTHER

## 2023-11-08 ENCOUNTER — PATIENT MESSAGE (OUTPATIENT)
Dept: PAIN MEDICINE | Facility: CLINIC | Age: 63
End: 2023-11-08
Payer: MEDICARE

## 2023-11-08 RX ORDER — HYDROCODONE BITARTRATE AND ACETAMINOPHEN 10; 325 MG/1; MG/1
1 TABLET ORAL EVERY 6 HOURS PRN
Qty: 120 TABLET | Refills: 0 | Status: SHIPPED | OUTPATIENT
Start: 2023-11-11 | End: 2023-12-08 | Stop reason: SDUPTHER

## 2023-12-08 ENCOUNTER — PATIENT MESSAGE (OUTPATIENT)
Dept: PAIN MEDICINE | Facility: CLINIC | Age: 63
End: 2023-12-08
Payer: MEDICARE

## 2023-12-08 RX ORDER — HYDROCODONE BITARTRATE AND ACETAMINOPHEN 10; 325 MG/1; MG/1
1 TABLET ORAL EVERY 6 HOURS PRN
Qty: 120 TABLET | Refills: 0 | Status: SHIPPED | OUTPATIENT
Start: 2023-12-08 | End: 2024-01-08 | Stop reason: SDUPTHER

## 2024-01-08 ENCOUNTER — PATIENT MESSAGE (OUTPATIENT)
Dept: PAIN MEDICINE | Facility: CLINIC | Age: 64
End: 2024-01-08
Payer: COMMERCIAL

## 2024-01-08 RX ORDER — HYDROCODONE BITARTRATE AND ACETAMINOPHEN 10; 325 MG/1; MG/1
1 TABLET ORAL EVERY 6 HOURS PRN
Qty: 120 TABLET | Refills: 0 | Status: SHIPPED | OUTPATIENT
Start: 2024-01-08 | End: 2024-02-07 | Stop reason: SDUPTHER

## 2024-01-08 RX ORDER — PREGABALIN 75 MG/1
CAPSULE ORAL
Qty: 90 CAPSULE | Refills: 2 | Status: SHIPPED | OUTPATIENT
Start: 2024-01-08

## 2024-01-08 RX ORDER — METHOCARBAMOL 750 MG/1
TABLET, FILM COATED ORAL
Qty: 40 TABLET | Refills: 2 | Status: SHIPPED | OUTPATIENT
Start: 2024-01-08

## 2024-01-22 ENCOUNTER — PATIENT MESSAGE (OUTPATIENT)
Dept: PSYCHIATRY | Facility: CLINIC | Age: 64
End: 2024-01-22
Payer: COMMERCIAL

## 2024-02-07 ENCOUNTER — PATIENT MESSAGE (OUTPATIENT)
Dept: PAIN MEDICINE | Facility: CLINIC | Age: 64
End: 2024-02-07
Payer: MEDICARE

## 2024-02-08 ENCOUNTER — TELEPHONE (OUTPATIENT)
Dept: PAIN MEDICINE | Facility: CLINIC | Age: 64
End: 2024-02-08
Payer: MEDICARE

## 2024-02-08 RX ORDER — HYDROCODONE BITARTRATE AND ACETAMINOPHEN 10; 325 MG/1; MG/1
1 TABLET ORAL EVERY 6 HOURS PRN
Qty: 120 TABLET | Refills: 0 | Status: SHIPPED | OUTPATIENT
Start: 2024-02-08 | End: 2024-03-07 | Stop reason: SDUPTHER

## 2024-02-08 NOTE — TELEPHONE ENCOUNTER
----- Message from Truong Nichols sent at 2/8/2024 11:32 AM CST -----  Type:  RX Refill Request    Who Called:  pt  Refill or New Rx:  REFILL  RX Name and Strength:  HYDROcodone-acetaminophen (NORCO)  mg per tablet  How is the patient currently taking it? (ex. 1XDay):  as directed  Is this a 30 day or 90 day RX:  30  Preferred Pharmacy with phone number:    [address] [address]  Phone: [phone] Fax: [phone]  Local or Mail Order:  local  Ordering Provider:  Jurgen Tapia Call Back Number:  [phone]  Additional Information:  pt would like a call back when this medication has been sent as the pt states that her pharmacy does not call and alert her when new Rx come in. Please call back to advise. Thanks!

## 2024-02-12 ENCOUNTER — PATIENT MESSAGE (OUTPATIENT)
Dept: PAIN MEDICINE | Facility: CLINIC | Age: 64
End: 2024-02-12
Payer: MEDICARE

## 2024-03-06 ENCOUNTER — PATIENT MESSAGE (OUTPATIENT)
Dept: PAIN MEDICINE | Facility: CLINIC | Age: 64
End: 2024-03-06
Payer: MEDICARE

## 2024-03-06 ENCOUNTER — TELEPHONE (OUTPATIENT)
Dept: PAIN MEDICINE | Facility: CLINIC | Age: 64
End: 2024-03-06
Payer: MEDICARE

## 2024-03-06 RX ORDER — HYDROCODONE BITARTRATE AND ACETAMINOPHEN 10; 325 MG/1; MG/1
1 TABLET ORAL EVERY 6 HOURS PRN
Qty: 120 TABLET | Refills: 0 | Status: CANCELLED | OUTPATIENT
Start: 2024-03-06 | End: 2024-04-05

## 2024-03-06 NOTE — TELEPHONE ENCOUNTER
She needs to be following up every 3 months and has not been seen in 11 months.  Please make an appointment with either Daniel or VICKY tomorrow or Friday.  Currently we both have 1 slot available.

## 2024-03-07 RX ORDER — HYDROCODONE BITARTRATE AND ACETAMINOPHEN 10; 325 MG/1; MG/1
1 TABLET ORAL EVERY 6 HOURS PRN
Qty: 120 TABLET | Refills: 0 | Status: SHIPPED | OUTPATIENT
Start: 2024-03-09 | End: 2024-03-11 | Stop reason: SDUPTHER

## 2024-03-07 NOTE — TELEPHONE ENCOUNTER
Has appt with Daniel Monday, please send Rx. I have reviewed the [place] Board of Pharmacy website and there are no abberancies.

## 2024-03-11 ENCOUNTER — HOSPITAL ENCOUNTER (OUTPATIENT)
Dept: RADIOLOGY | Facility: HOSPITAL | Age: 64
Discharge: HOME | End: 2024-03-11
Payer: MEDICARE

## 2024-03-11 ENCOUNTER — OFFICE VISIT (OUTPATIENT)
Dept: PAIN MEDICINE | Facility: CLINIC | Age: 64
End: 2024-03-11
Payer: MEDICARE

## 2024-03-11 ENCOUNTER — PATIENT MESSAGE (OUTPATIENT)
Dept: PAIN MEDICINE | Facility: CLINIC | Age: 64
End: 2024-03-11

## 2024-03-11 VITALS
HEIGHT: 66 IN | BODY MASS INDEX: 32.47 KG/M2 | DIASTOLIC BLOOD PRESSURE: 76 MMHG | WEIGHT: 202 LBS | SYSTOLIC BLOOD PRESSURE: 136 MMHG | HEART RATE: 72 BPM

## 2024-03-11 DIAGNOSIS — M50.30 DDD (DEGENERATIVE DISC DISEASE), CERVICAL: ICD-10-CM

## 2024-03-11 DIAGNOSIS — M54.9 DORSALGIA: ICD-10-CM

## 2024-03-11 DIAGNOSIS — Z79.891 OPIOID CONTRACT EXISTS: Primary | ICD-10-CM

## 2024-03-11 DIAGNOSIS — M54.9 DORSALGIA, UNSPECIFIED: ICD-10-CM

## 2024-03-11 DIAGNOSIS — E87.1 HYPONATREMIA: ICD-10-CM

## 2024-03-11 DIAGNOSIS — M54.16 LUMBAR RADICULOPATHY, CHRONIC: ICD-10-CM

## 2024-03-11 DIAGNOSIS — M54.16 LUMBAR RADICULOPATHY: ICD-10-CM

## 2024-03-11 PROCEDURE — 99999 PR PBB SHADOW E&M-EST. PATIENT-LVL IV: CPT | Mod: PBBFAC,,,

## 2024-03-11 PROCEDURE — 80307 DRUG TEST PRSMV CHEM ANLYZR: CPT

## 2024-03-11 PROCEDURE — 80347 BENZODIAZEPINES 13 OR MORE: CPT

## 2024-03-11 PROCEDURE — 99214 OFFICE O/P EST MOD 30 MIN: CPT | Mod: S$GLB,,,

## 2024-03-11 PROCEDURE — 80326 AMPHETAMINES 5 OR MORE: CPT

## 2024-03-11 PROCEDURE — 72110 X-RAY EXAM L-2 SPINE 4/>VWS: CPT | Mod: TC,PO

## 2024-03-11 PROCEDURE — 72110 X-RAY EXAM L-2 SPINE 4/>VWS: CPT | Mod: 26,,, | Performed by: RADIOLOGY

## 2024-03-11 RX ORDER — HYDROCODONE BITARTRATE AND ACETAMINOPHEN 10; 325 MG/1; MG/1
1 TABLET ORAL EVERY 6 HOURS PRN
Qty: 120 TABLET | Refills: 0 | Status: SHIPPED | OUTPATIENT
Start: 2024-04-06 | End: 2024-05-06

## 2024-03-11 RX ORDER — HYDROCODONE BITARTRATE AND ACETAMINOPHEN 10; 325 MG/1; MG/1
1 TABLET ORAL EVERY 6 HOURS PRN
Qty: 120 TABLET | Refills: 0 | Status: SHIPPED | OUTPATIENT
Start: 2024-05-06 | End: 2024-06-05 | Stop reason: SDUPTHER

## 2024-03-11 NOTE — TELEPHONE ENCOUNTER
I have reviewed the [place] Board of Pharmacy website and there are no abberancies.       Recently refilled.  These are the following 2 months prescriptions.

## 2024-03-11 NOTE — PROGRESS NOTES
CC: Neck and bilateral arm pain, back pain, knee pain     HPI: The patient is a 62-year-old woman with a history of hypertension, diabetes, multiple sclerosis, lumbar postlaminectomy syndrome who presents in self-referral for continued low back pain and bilateral leg pain, neck pain.  Today she is reporting worsening lower back pain, 10/10, constant, across her lower back with radiation down bilateral legs.  She reports no significant pain in legs when at rest but worsening pain when standing.  Her back pain is constant and feels like she has a lump in the left side of her lower back.  She reports falling in early February.  This worsening pain has been present for over a month and a half now.  She reports continued issues with low-sodium and is unable to proceed with any surgeries due to this.  She continues to take hydrocodone 10/325 mg 4 times daily.  She reports minimal benefits with this in his requesting a change in his medication.  She would like to try Percocet.      Pain intervention history: She has been followed by one of our previous [facility] pain management physicians, Dr. Chapa in [place] until recently.  She had undergone fusion surgery in 1983 and again surgery in 1993 for her lumbar spine.  She had undergone numerous lumbar spine injections in the past with good relief.  She has tried gabapentin with only minimal relief but states that Lyrica works best.  Sounds like she had a spinal cord stimulator trial in the past that did not help.  She has been taking hydrocodone up to 4 times a day for several years.  She is status post caudal epidural steroid injection on 8/6/14 with 60% relief.  She is status post caudal epidural steroid injection on 5/13/15 with 0% relief.  She is status post caudal epidural steroid injection on 10/2/15 with 0% relief.  She is status post C7-T1 cervical interlaminar epidural steroid injection on 9/7/16 with 100% relief of her bilateral arm pain and mild relief of her  "neck pain.  She is status post C7-T1 cervical interlaminar epidural steroid injection on 17 with at least 50% relief.  She is status post bilateral L1 transforaminal epidural steroid injections on 3/20/17 with 75% relief. She is status post C7-T1 cervical interlaminar epidural steroid injection on 17 and again on 18 with 90% relief of her neck and arm pain.  She is status post bilateral L1/2 transforaminal epidural steroid injections on 2018 with almost 100% relief of her low back pain.She is status post bilateral L1/2 transforaminal epidural steroid injections on 2020 with about 60% relief. She is status post C7-T1 interlaminar epidural steroid injection on 2021 with 0% relief.  She is status post bilateral L1/2 TF ROBIN on 2023 with no major benefit.    Spine surgeries: She had undergone fusion surgery in  and again surgery in  for her lumbar spine.     Antineuropathics:  Lyrica 75 in the morning and 150 at night  NSAIDs:  Physical therapy:  Antidepressants:  Cymbalta 60 daily, venlafaxine 225  Muscle relaxers:  Opioids:  Hydrocodone 10/325 4 times daily  Antiplatelets/Anticoagulants:    ROS:She reports urinary frequency, joint stiffness, joint swelling, back pain, memory loss, anxiety, depression, difficulty sleeping and loss of balance.  Balance of review of systems is negative.    Medical, surgical, family and social history reviewed elsewhere in record.    Medications/Allergies: See med card    Vitals:    24 1405   BP: 136/76   Pulse: 72   Weight: 91.6 kg (202 lb)   Height: 5' 5.5" (1.664 m)   PainSc: 10-Worst pain ever   PainLoc: Back           Physical exam:  Gen: A and O x3, pleasant, well-groomed    Imagin20 MRI L spine  T12-L1: Mild disc osteophyte complex.  Prominent epidural fat.  Mild right and minimal left facet hypertrophy.  Compression of the thecal sac with minimal fluid surrounding the conus just above the T12 level.  This has progressed " from prior study on 02/01/2017.  Moderate right and mild left foraminal stenosis.   L1-L2: Retrolisthesis.  Prominent epidural fat.  Spinal canal is somewhat obscured by artifact from hardware.  There appears to be moderate right and at least mild left narrowing of the lateral recesses with mild spinal canal stenosis.  This is progressed from prior study.  Foramina are partially obscured by artifact from hardware but there appears to be mild bilateral foraminal stenosis.   L2-L3: Fused.  No significant spinal canal stenosis.  Likely minimal bilateral foraminal stenosis.   L3-L4: Fused.  No significant spinal canal or foraminal stenosis.   L4-L5: Fused.  No significant spinal canal stenosis.  Foramina are partially obscured.  There appears to be mild-moderate right foraminal stenosis.   L5-S1: Fused.  Anterolisthesis.  Spinal canal is partially obscured but there appears to be no significant spinal canal stenosis.  Likely mild bilateral foraminal stenosis.    1/27/23 MRI C-spine:  Morphology: Vertebral body heights are preserved and marrow signal is within normal limits aside from minimal mixed fibrovascular and fatty degenerative endplate changes at C5-C6 in the setting of degenerative disc height loss discussed in further detail below..   Alignment: Redemonstrated grade 1 anterolisthesis of C3 on C4 and retrolisthesis of C5 on C6..   Cord: The cervical cord demonstrates normal signal intensity throughout its length.  Similar flattening of the right ventral cord at C5-C6..   Craniocervical Junction: Cerebellar tonsils are normally positioned. The visualized portions of the posterior fossa are unremarkable. The regional osseous anatomy is normal.   Disc levels:   C2-C3: No disc herniation.  The spinal canal is patent.  Uncovertebral spurring and facet arthrosis produce mild right foraminal narrowing.  The left foramen is patent.  These findings are unchanged..   C3-C4: Spondylolisthesis and shallow disc osteophyte  complex with ligamentum flavum thickening producing mild to moderate narrowing of the spinal canal.  Facet arthrosis contributes to severe right and moderate left foraminal narrowing similar to the prior exam..   C4-C5: Shallow broad-based disc osteophyte complex producing mild narrowing of the spinal canal.  Uncovertebral spurring and facet arthrosis contribute to mild-to-moderate right and mild left foraminal narrowing similar to the prior exam..   C5-C6: Moderate to severe degenerative disc height loss and broad-based disc osteophyte complex lateralizing to the right with ligamentum flavum thickening producing moderate to severe narrowing of the spinal canal also asymmetric to the right with cord flattening.  Uncovertebral spurring and facet arthrosis produce severe bilateral foraminal narrowing similar to the prior exam..   C6-C7: Shallow central disc osteophyte complex contributing to mild to moderate narrowing of the spinal canal.  Uncovertebral spurring and facet arthrosis produces moderate right and mild left foraminal narrowing.  These findings are unchanged..   C7-T1: Shallow central disc osteophyte complex minimally narrowing the central spinal canal.  Mild facet arthrosis bilaterally.  The foramina are patent..    Assessment:  The patient is a 62-year-old woman with a history of hypertension, diabetes, multiple sclerosis, lumbar postlaminectomy syndrome who presents in self-referral for continued low back pain and bilateral leg pain, neck pain.   1. Opioid contract exists  [facility] Drug Screen      2. Dorsalgia  X-Ray Lumbar Spine 5 View      3. Dorsalgia, unspecified  MRI Lumbar Spine Without Contrast      4. Lumbar radiculopathy, chronic  MRI Lumbar Spine Without Contrast      5. DDD (degenerative disc disease), cervical        6. Hyponatremia        7. Lumbar radiculopathy                                                                                                                                                                                                       Plan:  I am unsure why she is having worsening pain.  I will update her lumbar x-ray and lumbar MRI for further evaluation.  She continues report hyponatremia, unchanged, unresolved.  She will need to have this corrected prior to any surgeries.  Recent prescription for hydrocodone 10/325 mg for up to 4 times daily sent by Dr. Hamm.   Following 2 prescriptions for hydrocodone will be sent to Dr. Seaman for approval. I have reviewed the [place] Board of Pharmacy website and there are no abberancies.  UDS collected today  We will call her with results of imaging and future treatment plan.  Can consider caudal ROBIN.  She will be scheduled for a three-month follow-up.

## 2024-03-14 ENCOUNTER — PATIENT MESSAGE (OUTPATIENT)
Dept: PAIN MEDICINE | Facility: CLINIC | Age: 64
End: 2024-03-14
Payer: MEDICARE

## 2024-03-15 ENCOUNTER — TELEPHONE (OUTPATIENT)
Dept: PAIN MEDICINE | Facility: CLINIC | Age: 64
End: 2024-03-15
Payer: MEDICARE

## 2024-03-15 RX ORDER — DIAZEPAM 5 MG/1
10 TABLET ORAL ONCE AS NEEDED
Qty: 2 TABLET | Refills: 0 | Status: SHIPPED | OUTPATIENT
Start: 2024-03-15 | End: 2024-03-15

## 2024-03-15 NOTE — TELEPHONE ENCOUNTER
----- Message from Mary Carmen Bernardo, Patient Care Assistant sent at 3/15/2024  2:44 PM CDT -----  Regarding: advice  Contact: pt  Type: Needs Medical Advice    Who Called:  pt     Best Call Back Number: [phone] (home)     Additional Information: pt states she would like a callback regarding an MRI procedure on 3/18/24 and a unknown medication. Please call to advise. Thanks!

## 2024-03-16 LAB
6MAM UR QL: NOT DETECTED
7AMINOCLONAZEPAM UR QL: NOT DETECTED
A-OH ALPRAZ UR QL: NOT DETECTED
ALPHA-OH-MIDAZOLAM: NOT DETECTED
ALPRAZ UR QL: NOT DETECTED
AMPHET UR QL SCN: NOT DETECTED
ANNOTATION COMMENT IMP: NORMAL
BARBITURATES UR QL: NEGATIVE
BUPRENORPHINE UR QL: NOT DETECTED
BZE UR QL: NEGATIVE
CARBOXYTHC UR QL: NEGATIVE
CARISOPRODOL UR QL: NEGATIVE
CLONAZEPAM UR QL: NOT DETECTED
CODEINE UR QL: NOT DETECTED
CREAT UR-MCNC: 71.8 MG/DL (ref 20–400)
DIAZEPAM UR QL: NOT DETECTED
ETHYL GLUCURONIDE UR QL: NEGATIVE
FENTANYL UR QL: NOT DETECTED
GABAPENTIN: NOT DETECTED
HYDROCODONE UR QL: PRESENT
HYDROMORPHONE UR QL: PRESENT
LORAZEPAM UR QL: NOT DETECTED
MDA UR QL: NOT DETECTED
MDEA UR QL: NOT DETECTED
MDMA UR QL: NOT DETECTED
ME-PHENIDATE UR QL: NOT DETECTED
METHADONE UR QL: NEGATIVE
METHAMPHET UR QL: NOT DETECTED
MIDAZOLAM UR QL SCN: NOT DETECTED
MORPHINE UR QL: NOT DETECTED
NALOXONE: NOT DETECTED
NORBUPRENORPHINE UR QL CFM: NOT DETECTED
NORDIAZEPAM UR QL: NOT DETECTED
NORFENTANYL UR QL: NOT DETECTED
NORHYDROCODONE UR QL CFM: PRESENT
NORMEPERIDINE UR QL CFM: NOT DETECTED
NOROXYCODONE UR QL CFM: NOT DETECTED
NOROXYMORPHONE UR QL SCN: NOT DETECTED
OXAZEPAM UR QL: NOT DETECTED
OXYCODONE UR QL: NOT DETECTED
OXYMORPHONE UR QL: NOT DETECTED
PATHOLOGY STUDY: NORMAL
PCP UR QL: NEGATIVE
PHENTERMINE UR QL: NOT DETECTED
PREGABALIN: PRESENT
SERVICE CMNT-IMP: NORMAL
TAPENTADOL UR QL SCN: NOT DETECTED
TAPENTADOL UR QL SCN: NOT DETECTED
TEMAZEPAM UR QL: NOT DETECTED
TRAMADOL UR QL: NEGATIVE
ZOLPIDEM METABOLITE: NOT DETECTED
ZOLPIDEM UR QL: NOT DETECTED

## 2024-03-17 ENCOUNTER — PATIENT MESSAGE (OUTPATIENT)
Dept: PAIN MEDICINE | Facility: CLINIC | Age: 64
End: 2024-03-17
Payer: MEDICARE

## 2024-03-18 ENCOUNTER — TELEPHONE (OUTPATIENT)
Dept: PAIN MEDICINE | Facility: CLINIC | Age: 64
End: 2024-03-18
Payer: MEDICARE

## 2024-04-06 ENCOUNTER — PATIENT MESSAGE (OUTPATIENT)
Dept: PAIN MEDICINE | Facility: CLINIC | Age: 64
End: 2024-04-06
Payer: MEDICARE

## 2024-04-17 RX ORDER — METHOCARBAMOL 750 MG/1
TABLET, FILM COATED ORAL
Qty: 40 TABLET | Refills: 2 | Status: SHIPPED | OUTPATIENT
Start: 2024-04-17

## 2024-04-18 RX ORDER — PREGABALIN 75 MG/1
CAPSULE ORAL
Qty: 90 CAPSULE | Refills: 2 | Status: SHIPPED | OUTPATIENT
Start: 2024-04-18

## 2024-05-02 ENCOUNTER — PATIENT MESSAGE (OUTPATIENT)
Dept: PSYCHIATRY | Facility: CLINIC | Age: 64
End: 2024-05-02
Payer: MEDICARE

## 2024-06-05 ENCOUNTER — PATIENT MESSAGE (OUTPATIENT)
Dept: PAIN MEDICINE | Facility: CLINIC | Age: 64
End: 2024-06-05
Payer: MEDICARE

## 2024-06-05 RX ORDER — HYDROCODONE BITARTRATE AND ACETAMINOPHEN 10; 325 MG/1; MG/1
1 TABLET ORAL EVERY 6 HOURS PRN
Qty: 120 TABLET | Refills: 0 | Status: SHIPPED | OUTPATIENT
Start: 2024-06-05 | End: 2024-07-05

## 2024-06-09 ENCOUNTER — PATIENT MESSAGE (OUTPATIENT)
Dept: PAIN MEDICINE | Facility: CLINIC | Age: 64
End: 2024-06-09
Payer: MEDICARE

## 2024-06-26 DIAGNOSIS — D48.5 NEOPLASM OF UNCERTAIN BEHAVIOR OF SKIN OF SHOULDER: Primary | ICD-10-CM

## 2024-07-02 ENCOUNTER — PATIENT MESSAGE (OUTPATIENT)
Dept: PAIN MEDICINE | Facility: CLINIC | Age: 64
End: 2024-07-02
Payer: MEDICARE

## 2024-07-02 RX ORDER — PREGABALIN 75 MG/1
75 CAPSULE ORAL 3 TIMES DAILY
Qty: 90 CAPSULE | Refills: 2 | Status: SHIPPED | OUTPATIENT
Start: 2024-07-02

## 2024-07-02 RX ORDER — METHOCARBAMOL 750 MG/1
750 TABLET, FILM COATED ORAL 3 TIMES DAILY PRN
Qty: 40 TABLET | Refills: 2 | Status: SHIPPED | OUTPATIENT
Start: 2024-07-02

## 2024-07-02 RX ORDER — HYDROCODONE BITARTRATE AND ACETAMINOPHEN 10; 325 MG/1; MG/1
1 TABLET ORAL EVERY 6 HOURS PRN
Qty: 120 TABLET | Refills: 0 | Status: SHIPPED | OUTPATIENT
Start: 2024-07-03 | End: 2024-08-02

## 2024-07-02 NOTE — TELEPHONE ENCOUNTER
Patient asking for a new script for hydrocodone to be filled 7/3/24 instead of 7/5 see below pharmacy closed on 7/4    Please advise

## 2024-07-08 ENCOUNTER — HOSPITAL ENCOUNTER (OUTPATIENT)
Dept: RADIOLOGY | Facility: HOSPITAL | Age: 64
Discharge: HOME | End: 2024-07-08
Attending: INTERNAL MEDICINE
Payer: MEDICARE

## 2024-07-08 DIAGNOSIS — D48.5 NEOPLASM OF UNCERTAIN BEHAVIOR OF SKIN OF SHOULDER: ICD-10-CM

## 2024-07-08 PROCEDURE — 76882 US LMTD JT/FCL EVL NVASC XTR: CPT | Mod: TC,RT

## 2024-07-16 ENCOUNTER — TELEPHONE (OUTPATIENT)
Dept: PAIN MEDICINE | Facility: CLINIC | Age: 64
End: 2024-07-16

## 2024-07-16 ENCOUNTER — OFFICE VISIT (OUTPATIENT)
Dept: PAIN MEDICINE | Facility: CLINIC | Age: 64
End: 2024-07-16
Payer: MEDICARE

## 2024-07-16 DIAGNOSIS — M96.1 POSTLAMINECTOMY SYNDROME OF LUMBAR REGION: ICD-10-CM

## 2024-07-16 DIAGNOSIS — Z79.891 OPIOID CONTRACT EXISTS: ICD-10-CM

## 2024-07-16 DIAGNOSIS — G35 MULTIPLE SCLEROSIS: ICD-10-CM

## 2024-07-16 DIAGNOSIS — M51.36 DDD (DEGENERATIVE DISC DISEASE), LUMBAR: ICD-10-CM

## 2024-07-16 DIAGNOSIS — M54.16 LUMBAR RADICULOPATHY: ICD-10-CM

## 2024-07-16 DIAGNOSIS — M54.16 LUMBAR RADICULOPATHY, CHRONIC: Primary | ICD-10-CM

## 2024-07-16 DIAGNOSIS — M50.30 DDD (DEGENERATIVE DISC DISEASE), CERVICAL: ICD-10-CM

## 2024-07-16 DIAGNOSIS — Z79.891 OPIOID CONTRACT EXISTS: Primary | ICD-10-CM

## 2024-07-16 PROCEDURE — 1160F RVW MEDS BY RX/DR IN RCRD: CPT | Mod: CPTII,95,, | Performed by: PHYSICIAN ASSISTANT

## 2024-07-16 PROCEDURE — 1159F MED LIST DOCD IN RCRD: CPT | Mod: CPTII,95,, | Performed by: PHYSICIAN ASSISTANT

## 2024-07-16 PROCEDURE — 99214 OFFICE O/P EST MOD 30 MIN: CPT | Mod: 95,,, | Performed by: PHYSICIAN ASSISTANT

## 2024-07-16 RX ORDER — HYDROCODONE BITARTRATE AND ACETAMINOPHEN 10; 325 MG/1; MG/1
1 TABLET ORAL EVERY 6 HOURS PRN
Qty: 120 TABLET | Refills: 0 | Status: SHIPPED | OUTPATIENT
Start: 2024-08-02 | End: 2024-09-01

## 2024-07-16 RX ORDER — HYDROCODONE BITARTRATE AND ACETAMINOPHEN 10; 325 MG/1; MG/1
1 TABLET ORAL EVERY 6 HOURS PRN
Qty: 120 TABLET | Refills: 0 | Status: SHIPPED | OUTPATIENT
Start: 2024-09-01 | End: 2024-10-01

## 2024-07-16 RX ORDER — HYDROCODONE BITARTRATE AND ACETAMINOPHEN 10; 325 MG/1; MG/1
1 TABLET ORAL EVERY 6 HOURS PRN
Qty: 120 TABLET | Refills: 0 | Status: SHIPPED | OUTPATIENT
Start: 2024-10-01 | End: 2024-10-31

## 2024-07-16 NOTE — TELEPHONE ENCOUNTER
Virtual visit completed.  Please send prescriptions to her pharmacy. I have reviewed the [place] Board [employer] website and there are no abberancies.

## 2024-07-25 ENCOUNTER — PATIENT MESSAGE (OUTPATIENT)
Dept: PSYCHIATRY | Facility: CLINIC | Age: 64
End: 2024-07-25
Payer: MEDICARE

## 2024-08-05 ENCOUNTER — PATIENT MESSAGE (OUTPATIENT)
Dept: PSYCHIATRY | Facility: CLINIC | Age: 64
End: 2024-08-05
Payer: MEDICARE

## 2024-09-01 ENCOUNTER — PATIENT MESSAGE (OUTPATIENT)
Dept: PAIN MEDICINE | Facility: CLINIC | Age: 64
End: 2024-09-01
Payer: MEDICARE

## 2024-09-09 ENCOUNTER — PATIENT MESSAGE (OUTPATIENT)
Dept: PAIN MEDICINE | Facility: CLINIC | Age: 64
End: 2024-09-09
Payer: MEDICARE

## 2024-09-27 RX ORDER — PREGABALIN 75 MG/1
CAPSULE ORAL
Qty: 90 CAPSULE | Refills: 2 | Status: SHIPPED | OUTPATIENT
Start: 2024-09-27

## 2024-09-27 NOTE — TELEPHONE ENCOUNTER
Please send prescription. I have reviewed the [place] Board [employer] website and there are no abberancies.

## 2024-10-11 ENCOUNTER — PATIENT MESSAGE (OUTPATIENT)
Dept: PAIN MEDICINE | Facility: CLINIC | Age: 64
End: 2024-10-11
Payer: MEDICARE

## 2024-10-17 ENCOUNTER — PATIENT MESSAGE (OUTPATIENT)
Dept: PAIN MEDICINE | Facility: CLINIC | Age: 64
End: 2024-10-17
Payer: MEDICARE

## 2024-10-18 ENCOUNTER — TELEPHONE (OUTPATIENT)
Dept: PAIN MEDICINE | Facility: CLINIC | Age: 64
End: 2024-10-18
Payer: MEDICARE

## 2024-10-18 ENCOUNTER — OFFICE VISIT (OUTPATIENT)
Dept: PAIN MEDICINE | Facility: CLINIC | Age: 64
End: 2024-10-18
Payer: MEDICARE

## 2024-10-18 DIAGNOSIS — G89.29 CHRONIC PAIN OF LEFT KNEE: ICD-10-CM

## 2024-10-18 DIAGNOSIS — G35 MULTIPLE SCLEROSIS: ICD-10-CM

## 2024-10-18 DIAGNOSIS — Z79.891 OPIOID CONTRACT EXISTS: ICD-10-CM

## 2024-10-18 DIAGNOSIS — M96.1 POSTLAMINECTOMY SYNDROME OF LUMBAR REGION: Primary | ICD-10-CM

## 2024-10-18 DIAGNOSIS — M25.512 CHRONIC PAIN OF BOTH SHOULDERS: ICD-10-CM

## 2024-10-18 DIAGNOSIS — M25.511 CHRONIC PAIN OF BOTH SHOULDERS: ICD-10-CM

## 2024-10-18 DIAGNOSIS — M25.551 RIGHT HIP PAIN: ICD-10-CM

## 2024-10-18 DIAGNOSIS — G89.29 CHRONIC PAIN OF BOTH SHOULDERS: ICD-10-CM

## 2024-10-18 DIAGNOSIS — M50.30 DDD (DEGENERATIVE DISC DISEASE), CERVICAL: ICD-10-CM

## 2024-10-18 DIAGNOSIS — M54.16 LUMBAR RADICULOPATHY: ICD-10-CM

## 2024-10-18 DIAGNOSIS — M25.562 CHRONIC PAIN OF LEFT KNEE: ICD-10-CM

## 2024-10-18 NOTE — PROGRESS NOTES
The patient location is: [facility]  The chief complaint leading to consultation is: multiple pain complaints  Visit type: Virtual visit with synchronous audio and video  Total time spent with patient: 9 minutes  Each patient to whom he or she provides medical services by telemedicine is:  (1) informed of the relationship between the physician and patient and the respective role of any other health care provider with respect to management of the patient; and (2) notified that he or she may decline to receive medical services by telemedicine and may withdraw from such care at any time.    CC: Neck and bilateral arm pain, back pain, knee pain     HPI: The patient is a 64-year-old woman with a history of hypertension, diabetes, multiple sclerosis, lumbar postlaminectomy syndrome who presents in self-referral for continued low back pain and bilateral leg pain, neck pain.  She presents in follow-up today for a virtual visit with multiple pain complaints including right shoulder pain, low back pain, hip pain, knee pain.  She reports the recent passing of her .  She reports good support from her son and her primary care physician.  She continues to take pain medication with some relief of her pain.  She is requesting to increase Lyrica.      Pain intervention history: She has been followed by one of our previous [facility] pain management physicians, Dr. Chapa in [place] until recently.  She had undergone fusion surgery in 1983 and again surgery in 1993 for her lumbar spine.  She had undergone numerous lumbar spine injections in the past with good relief.  She has tried gabapentin with only minimal relief but states that Lyrica works best.  Sounds like she had a spinal cord stimulator trial in the past that did not help.  She has been taking hydrocodone up to 4 times a day for several years.  She is status post caudal epidural steroid injection on 8/6/14 with 60% relief.  She is status post caudal epidural  steroid injection on 5/13/15 with 0% relief.  She is status post caudal epidural steroid injection on 10/2/15 with 0% relief.  She is status post C7-T1 cervical interlaminar epidural steroid injection on 16 with 100% relief of her bilateral arm pain and mild relief of her neck pain.  She is status post C7-T1 cervical interlaminar epidural steroid injection on 17 with at least 50% relief.  She is status post bilateral L1 transforaminal epidural steroid injections on 3/20/17 with 75% relief. She is status post C7-T1 cervical interlaminar epidural steroid injection on 17 and again on 18 with 90% relief of her neck and arm pain.  She is status post bilateral L1/2 transforaminal epidural steroid injections on 2018 with almost 100% relief of her low back pain.She is status post bilateral L1/2 transforaminal epidural steroid injections on 2020 with about 60% relief. She is status post C7-T1 interlaminar epidural steroid injection on 2021 with 0% relief.  She is status post bilateral L1/2 TF ROBIN on 2023 with no major benefit.    Spine surgeries: She had undergone fusion surgery in  and again surgery in  for her lumbar spine.     Antineuropathics:  Lyrica 75 in the morning and 150 at night  NSAIDs:  Physical therapy:  Antidepressants:  Cymbalta 60 daily, venlafaxine 225  Muscle relaxers:  Opioids:  Hydrocodone 10/325 4 times daily  Antiplatelets/Anticoagulants:    ROS:She reports urinary frequency, joint stiffness, joint swelling, back pain, memory loss, anxiety, depression, difficulty sleeping and loss of balance.  Balance of review of systems is negative.    Medical, surgical, family and social history reviewed elsewhere in record.    Medications/Allergies: See med card    There were no vitals filed for this visit.      Physical exam:  Gen: A and O x3, tearful    Imagin20 MRI L spine  T12-L1: Mild disc osteophyte complex.  Prominent epidural fat.  Mild right and  minimal left facet hypertrophy.  Compression of the thecal sac with minimal fluid surrounding the conus just above the T12 level.  This has progressed from prior study on 02/01/2017.  Moderate right and mild left foraminal stenosis.   L1-L2: Retrolisthesis.  Prominent epidural fat.  Spinal canal is somewhat obscured by artifact from hardware.  There appears to be moderate right and at least mild left narrowing of the lateral recesses with mild spinal canal stenosis.  This is progressed from prior study.  Foramina are partially obscured by artifact from hardware but there appears to be mild bilateral foraminal stenosis.   L2-L3: Fused.  No significant spinal canal stenosis.  Likely minimal bilateral foraminal stenosis.   L3-L4: Fused.  No significant spinal canal or foraminal stenosis.   L4-L5: Fused.  No significant spinal canal stenosis.  Foramina are partially obscured.  There appears to be mild-moderate right foraminal stenosis.   L5-S1: Fused.  Anterolisthesis.  Spinal canal is partially obscured but there appears to be no significant spinal canal stenosis.  Likely mild bilateral foraminal stenosis.    1/27/23 MRI C-spine:  Morphology: Vertebral body heights are preserved and marrow signal is within normal limits aside from minimal mixed fibrovascular and fatty degenerative endplate changes at C5-C6 in the setting of degenerative disc height loss discussed in further detail below..   Alignment: Redemonstrated grade 1 anterolisthesis of C3 on C4 and retrolisthesis of C5 on C6..   Cord: The cervical cord demonstrates normal signal intensity throughout its length.  Similar flattening of the right ventral cord at C5-C6..   Craniocervical Junction: Cerebellar tonsils are normally positioned. The visualized portions of the posterior fossa are unremarkable. The regional osseous anatomy is normal.   Disc levels:   C2-C3: No disc herniation.  The spinal canal is patent.  Uncovertebral spurring and facet arthrosis produce  mild right foraminal narrowing.  The left foramen is patent.  These findings are unchanged..   C3-C4: Spondylolisthesis and shallow disc osteophyte complex with ligamentum flavum thickening producing mild to moderate narrowing of the spinal canal.  Facet arthrosis contributes to severe right and moderate left foraminal narrowing similar to the prior exam..   C4-C5: Shallow broad-based disc osteophyte complex producing mild narrowing of the spinal canal.  Uncovertebral spurring and facet arthrosis contribute to mild-to-moderate right and mild left foraminal narrowing similar to the prior exam..   C5-C6: Moderate to severe degenerative disc height loss and broad-based disc osteophyte complex lateralizing to the right with ligamentum flavum thickening producing moderate to severe narrowing of the spinal canal also asymmetric to the right with cord flattening.  Uncovertebral spurring and facet arthrosis produce severe bilateral foraminal narrowing similar to the prior exam..   C6-C7: Shallow central disc osteophyte complex contributing to mild to moderate narrowing of the spinal canal.  Uncovertebral spurring and facet arthrosis produces moderate right and mild left foraminal narrowing.  These findings are unchanged..   C7-T1: Shallow central disc osteophyte complex minimally narrowing the central spinal canal.  Mild facet arthrosis bilaterally.  The foramina are patent..    Assessment:  The patient is a 64-year-old woman with a history of hypertension, diabetes, multiple sclerosis, lumbar postlaminectomy syndrome who presents in self-referral for continued low back pain and bilateral leg pain, neck pain.   1. Postlaminectomy syndrome of lumbar region        2. Lumbar radiculopathy        3. DDD (degenerative disc disease), cervical        4. Multiple sclerosis        5. Right hip pain        6. Chronic pain of both shoulders        7. Chronic pain of left knee        8. Opioid contract exists                                                                                                                                                                                                       Plan:  1.  Dr. Seaman provided prescriptions for hydrocodone-acetaminophen 10/325 mg up to 4 times a day as needed for pain and Lyrica 100 mg 3 times daily.  She will take 100 mg in the morning and 200 mg in the evening. I have reviewed the [place] Board of [employer]acy website and there are no abberancies.    2. Follow-up in 3 months or sooner as needed.

## 2024-10-21 RX ORDER — PREGABALIN 100 MG/1
100 CAPSULE ORAL 3 TIMES DAILY
Qty: 90 CAPSULE | Refills: 2 | Status: SHIPPED | OUTPATIENT
Start: 2024-10-21 | End: 2025-04-21

## 2024-10-21 RX ORDER — HYDROCODONE BITARTRATE AND ACETAMINOPHEN 10; 325 MG/1; MG/1
1 TABLET ORAL EVERY 6 HOURS PRN
Qty: 120 TABLET | Refills: 0 | Status: SHIPPED | OUTPATIENT
Start: 2024-12-01 | End: 2024-12-31

## 2024-10-21 RX ORDER — HYDROCODONE BITARTRATE AND ACETAMINOPHEN 10; 325 MG/1; MG/1
1 TABLET ORAL EVERY 6 HOURS PRN
Qty: 120 TABLET | Refills: 0 | Status: SHIPPED | OUTPATIENT
Start: 2024-11-01 | End: 2024-12-01

## 2024-10-21 RX ORDER — HYDROCODONE BITARTRATE AND ACETAMINOPHEN 10; 325 MG/1; MG/1
1 TABLET ORAL EVERY 6 HOURS PRN
Qty: 120 TABLET | Refills: 0 | Status: SHIPPED | OUTPATIENT
Start: 2024-12-31 | End: 2025-01-30

## 2024-11-04 ENCOUNTER — PATIENT MESSAGE (OUTPATIENT)
Dept: PAIN MEDICINE | Facility: CLINIC | Age: 64
End: 2024-11-04
Payer: MEDICARE

## 2024-12-16 ENCOUNTER — PATIENT MESSAGE (OUTPATIENT)
Dept: PSYCHIATRY | Facility: CLINIC | Age: 64
End: 2024-12-16
Payer: MEDICARE

## 2024-12-28 NOTE — PROGRESS NOTES
The patient location is: [facility]  The chief complaint leading to consultation is: multiple pain complaints  Visit type: Virtual visit with synchronous audio and video  Total time spent with patient: 15 minutes  Each patient to whom he or she provides medical services by telemedicine is:  (1) informed of the relationship between the physician and patient and the respective role of any other health care provider with respect to management of the patient; and (2) notified that he or she may decline to receive medical services by telemedicine and may withdraw from such care at any time.    CC: Neck and bilateral arm pain, back pain, knee pain     HPI: The patient is a 64-year-old woman with a history of hypertension, diabetes, multiple sclerosis, lumbar postlaminectomy syndrome who presents in self-referral for continued low back pain and bilateral leg pain, neck pain.  She presents today for a virtual visit with multiple pain complaints including neck pain, right shoulder pain, knee pain, low back pain, wrist and thumb pain.  She spent much of the visit asking for stronger opioid pain medication or a larger quantity.  She is going to have another knee surgery on 08/26.  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  She states her previous shoulder Dr. Moved out of state so she has been referred to a new doctor at [facility] but has not made this appointment yet.  She was told in the past that she needs to have neck surgery before her shoulder surgery but she does not plan on having either one.  She also complains of low back pain radiating to the right buttock.  She also describes what sounds like de Quervain tenosynovitis and was given oral steroids for this by her primary care physician.    Today she is reporting worsening lower back pain, 10/10, constant, across her lower back with radiation down bilateral legs.  She reports no significant pain in legs when at rest but worsening  pain when standing.  Her back pain is constant and feels like she has a lump in the left side of her lower back.  She reports falling in early February.  This worsening pain has been present for over a month and a half now.  She reports continued issues with low-sodium and is unable to proceed with any surgeries due to this.  She continues to take hydrocodone 10/325 mg 4 times daily.  She reports minimal benefits with this in his requesting a change in his medication.  She would like to try Percocet.      Pain intervention history: She has been followed by one of our previous [facility] pain management physicians, Dr. Chapa in [place] until recently.  She had undergone fusion surgery in 1983 and again surgery in 1993 for her lumbar spine.  She had undergone numerous lumbar spine injections in the past with good relief.  She has tried gabapentin with only minimal relief but states that Lyrica works best.  Sounds like she had a spinal cord stimulator trial in the past that did not help.  She has been taking hydrocodone up to 4 times a day for several years.  She is status post caudal epidural steroid injection on 8/6/14 with 60% relief.  She is status post caudal epidural steroid injection on 5/13/15 with 0% relief.  She is status post caudal epidural steroid injection on 10/2/15 with 0% relief.  She is status post C7-T1 cervical interlaminar epidural steroid injection on 9/7/16 with 100% relief of her bilateral arm pain and mild relief of her neck pain.  She is status post C7-T1 cervical interlaminar epidural steroid injection on 2/21/17 with at least 50% relief.  She is status post bilateral L1 transforaminal epidural steroid injections on 3/20/17 with 75% relief. She is status post C7-T1 cervical interlaminar epidural steroid injection on 12/22/17 and again on 2/9/18 with 90% relief of her neck and arm pain.  She is status post bilateral L1/2 transforaminal epidural steroid injections on 08/08/2018 with almost  100% relief of her low back pain.She is status post bilateral L1/2 transforaminal epidural steroid injections on 2020 with about 60% relief. She is status post C7-T1 interlaminar epidural steroid injection on 2021 with 0% relief.  She is status post bilateral L1/2 TF ROBIN on 2023 with no major benefit.    Spine surgeries: She had undergone fusion surgery in  and again surgery in  for her lumbar spine.     Antineuropathics:  Lyrica 75 in the morning and 150 at night  NSAIDs:  Physical therapy:  Antidepressants:  Cymbalta 60 daily, venlafaxine 225  Muscle relaxers:  Opioids:  Hydrocodone 10/325 4 times daily  Antiplatelets/Anticoagulants:    ROS:She reports urinary frequency, joint stiffness, joint swelling, back pain, memory loss, anxiety, depression, difficulty sleeping and loss of balance.  Balance of review of systems is negative.    Medical, surgical, family and social history reviewed elsewhere in record.    Medications/Allergies: See med card    There were no vitals filed for this visit.      Physical exam:  Gen: A and O x3, tearful    Imagin20 MRI L spine  T12-L1: Mild disc osteophyte complex.  Prominent epidural fat.  Mild right and minimal left facet hypertrophy.  Compression of the thecal sac with minimal fluid surrounding the conus just above the T12 level.  This has progressed from prior study on 2017.  Moderate right and mild left foraminal stenosis.   L1-L2: Retrolisthesis.  Prominent epidural fat.  Spinal canal is somewhat obscured by artifact from hardware.  There appears to be moderate right and at least mild left narrowing of the lateral recesses with mild spinal canal stenosis.  This is progressed from prior study.  Foramina are partially obscured by artifact from hardware but there appears to be mild bilateral foraminal stenosis.   L2-L3: Fused.  No significant spinal canal stenosis.  Likely minimal bilateral foraminal stenosis.   L3-L4: Fused.  No  significant spinal canal or foraminal stenosis.   L4-L5: Fused.  No significant spinal canal stenosis.  Foramina are partially obscured.  There appears to be mild-moderate right foraminal stenosis.   L5-S1: Fused.  Anterolisthesis.  Spinal canal is partially obscured but there appears to be no significant spinal canal stenosis.  Likely mild bilateral foraminal stenosis.    1/27/23 MRI C-spine:  Morphology: Vertebral body heights are preserved and marrow signal is within normal limits aside from minimal mixed fibrovascular and fatty degenerative endplate changes at C5-C6 in the setting of degenerative disc height loss discussed in further detail below..   Alignment: Redemonstrated grade 1 anterolisthesis of C3 on C4 and retrolisthesis of C5 on C6..   Cord: The cervical cord demonstrates normal signal intensity throughout its length.  Similar flattening of the right ventral cord at C5-C6..   Craniocervical Junction: Cerebellar tonsils are normally positioned. The visualized portions of the posterior fossa are unremarkable. The regional osseous anatomy is normal.   Disc levels:   C2-C3: No disc herniation.  The spinal canal is patent.  Uncovertebral spurring and facet arthrosis produce mild right foraminal narrowing.  The left foramen is patent.  These findings are unchanged..   C3-C4: Spondylolisthesis and shallow disc osteophyte complex with ligamentum flavum thickening producing mild to moderate narrowing of the spinal canal.  Facet arthrosis contributes to severe right and moderate left foraminal narrowing similar to the prior exam..   C4-C5: Shallow broad-based disc osteophyte complex producing mild narrowing of the spinal canal.  Uncovertebral spurring and facet arthrosis contribute to mild-to-moderate right and mild left foraminal narrowing similar to the prior exam..   C5-C6: Moderate to severe degenerative disc height loss and broad-based disc osteophyte complex lateralizing to the right with ligamentum flavum  thickening producing moderate to severe narrowing of the spinal canal also asymmetric to the right with cord flattening.  Uncovertebral spurring and facet arthrosis produce severe bilateral foraminal narrowing similar to the prior exam..   C6-C7: Shallow central disc osteophyte complex contributing to mild to moderate narrowing of the spinal canal.  Uncovertebral spurring and facet arthrosis produces moderate right and mild left foraminal narrowing.  These findings are unchanged..   C7-T1: Shallow central disc osteophyte complex minimally narrowing the central spinal canal.  Mild facet arthrosis bilaterally.  The foramina are patent..    Assessment:  The patient is a 64-year-old woman with a history of hypertension, diabetes, multiple sclerosis, lumbar postlaminectomy syndrome who presents in self-referral for continued low back pain and bilateral leg pain, neck pain.   1. Lumbar radiculopathy, chronic        2. DDD (degenerative disc disease), cervical        3. DDD (degenerative disc disease), lumbar        4. Postlaminectomy syndrome of lumbar region        5. Lumbar radiculopathy        6. Multiple sclerosis        7. Opioid contract exists                                                                                                                                                                                                      Plan:  1. I again explained to the patient that we do not suggest increasing doses of opioids as she is only going to developed further tolerance.  Dr. Seaman sent prescriptions to her pharmacy for hydrocodone-acetaminophen 10/325 mg up to 4 times a day as needed for pain.  She requested for medication increase throughout the visit and I explained that she can get a 2nd opinion if she would like. I have reviewed the [place] Board of Pharmacy website and there are no abberancies.    2. She will have knee surgery on 08/26.    3. I encouraged her to make her appointment for her  shoulder.    4. We discussed repeating lumbar epidural steroid injection in the future.    5. Follow-up in 3 months or sooner as needed.                     - - -

## 2025-01-14 RX ORDER — METHOCARBAMOL 750 MG/1
750 TABLET, FILM COATED ORAL
Qty: 40 TABLET | Refills: 2 | Status: SHIPPED | OUTPATIENT
Start: 2025-01-14

## 2025-01-23 ENCOUNTER — OFFICE VISIT (OUTPATIENT)
Dept: PAIN MEDICINE | Facility: CLINIC | Age: 65
End: 2025-01-23
Payer: MEDICARE

## 2025-01-23 ENCOUNTER — PATIENT MESSAGE (OUTPATIENT)
Dept: PAIN MEDICINE | Facility: CLINIC | Age: 65
End: 2025-01-23

## 2025-01-23 DIAGNOSIS — M25.511 CHRONIC PAIN OF BOTH SHOULDERS: ICD-10-CM

## 2025-01-23 DIAGNOSIS — G89.29 CHRONIC PAIN OF LEFT KNEE: ICD-10-CM

## 2025-01-23 DIAGNOSIS — Z79.891 OPIOID CONTRACT EXISTS: ICD-10-CM

## 2025-01-23 DIAGNOSIS — M50.30 DDD (DEGENERATIVE DISC DISEASE), CERVICAL: ICD-10-CM

## 2025-01-23 DIAGNOSIS — M96.1 POSTLAMINECTOMY SYNDROME OF LUMBAR REGION: Primary | ICD-10-CM

## 2025-01-23 DIAGNOSIS — G89.29 CHRONIC PAIN OF BOTH SHOULDERS: ICD-10-CM

## 2025-01-23 DIAGNOSIS — M54.16 LUMBAR RADICULOPATHY: ICD-10-CM

## 2025-01-23 DIAGNOSIS — M25.512 CHRONIC PAIN OF BOTH SHOULDERS: ICD-10-CM

## 2025-01-23 DIAGNOSIS — G35 MULTIPLE SCLEROSIS: ICD-10-CM

## 2025-01-23 DIAGNOSIS — M25.562 CHRONIC PAIN OF LEFT KNEE: ICD-10-CM

## 2025-01-23 PROCEDURE — 1159F MED LIST DOCD IN RCRD: CPT | Mod: CPTII,95,, | Performed by: PHYSICIAN ASSISTANT

## 2025-01-23 PROCEDURE — 1160F RVW MEDS BY RX/DR IN RCRD: CPT | Mod: CPTII,95,, | Performed by: PHYSICIAN ASSISTANT

## 2025-01-23 PROCEDURE — 98006 SYNCH AUDIO-VIDEO EST MOD 30: CPT | Mod: 95,,, | Performed by: PHYSICIAN ASSISTANT

## 2025-01-23 NOTE — TELEPHONE ENCOUNTER
Virtual visit completed, please send Rx. I have reviewed the [place] Board of Pharmacy website and there are no abberancies.

## 2025-01-24 RX ORDER — HYDROCODONE BITARTRATE AND ACETAMINOPHEN 10; 325 MG/1; MG/1
1 TABLET ORAL EVERY 6 HOURS PRN
Qty: 120 TABLET | Refills: 0 | Status: SHIPPED | OUTPATIENT
Start: 2025-03-04 | End: 2025-04-03

## 2025-01-24 RX ORDER — HYDROCODONE BITARTRATE AND ACETAMINOPHEN 10; 325 MG/1; MG/1
1 TABLET ORAL EVERY 6 HOURS PRN
Qty: 120 TABLET | Refills: 0 | Status: SHIPPED | OUTPATIENT
Start: 2025-02-02 | End: 2025-01-30 | Stop reason: SDUPTHER

## 2025-01-24 RX ORDER — HYDROCODONE BITARTRATE AND ACETAMINOPHEN 10; 325 MG/1; MG/1
1 TABLET ORAL EVERY 6 HOURS PRN
Qty: 120 TABLET | Refills: 0 | Status: SHIPPED | OUTPATIENT
Start: 2025-04-03 | End: 2025-05-03

## 2025-01-27 ENCOUNTER — PATIENT MESSAGE (OUTPATIENT)
Dept: PAIN MEDICINE | Facility: CLINIC | Age: 65
End: 2025-01-27
Payer: MEDICARE

## 2025-01-27 ENCOUNTER — TELEPHONE (OUTPATIENT)
Dept: PAIN MEDICINE | Facility: CLINIC | Age: 65
End: 2025-01-27
Payer: MEDICARE

## 2025-01-27 NOTE — TELEPHONE ENCOUNTER
Please let the patient know we have not received anything from Dr. Silver' office.  Ask her to reach out to them to find out which procedure they wanted us to try for her.

## 2025-01-27 NOTE — TELEPHONE ENCOUNTER
----- Message from [place] sent at 1/27/2025 10:27 AM CST -----  Contact: Patient  Type:  Needs Medical Advice    Who Called:  Patient    Would the patient rather a call back or a response via [facility]?  Call back  Best Call Back Number:    [phone]    Additional Information:   States she would like to speak with the nurse to see if they have received the paperwork to schedule her injection - please call - thank you

## 2025-01-28 NOTE — TELEPHONE ENCOUNTER
Reviewed, Dr Silver suggested L1/2 ROBIN. Please schedule:    Physician - Dr Seaman    Type of Procedure/Injection - Lumbar Epidural  L1/2           Laterality - NA      Priority - Normal      Anxiolysis- Local      Need to hold medication - No      N/A    None      Clearance needed - No      Follow up - 4 week

## 2025-01-29 DIAGNOSIS — M54.16 LUMBAR RADICULOPATHY: Primary | ICD-10-CM

## 2025-01-29 NOTE — TELEPHONE ENCOUNTER
Spoke with patient and scheduled. Pre op information given to patient and follow up appointment scheduled.

## 2025-01-30 ENCOUNTER — PATIENT MESSAGE (OUTPATIENT)
Dept: PAIN MEDICINE | Facility: CLINIC | Age: 65
End: 2025-01-30
Payer: MEDICARE

## 2025-01-30 ENCOUNTER — TELEPHONE (OUTPATIENT)
Dept: PAIN MEDICINE | Facility: CLINIC | Age: 65
End: 2025-01-30
Payer: MEDICARE

## 2025-01-30 DIAGNOSIS — Z79.891 OPIOID CONTRACT EXISTS: ICD-10-CM

## 2025-01-30 RX ORDER — HYDROCODONE BITARTRATE AND ACETAMINOPHEN 10; 325 MG/1; MG/1
1 TABLET ORAL EVERY 6 HOURS PRN
Qty: 120 TABLET | Refills: 0 | Status: SHIPPED | OUTPATIENT
Start: 2025-02-01 | End: 2025-03-03

## 2025-02-02 NOTE — PROGRESS NOTES
The patient location is: [facility]  The chief complaint leading to consultation is: multiple pain complaints  Visit type: Virtual visit with synchronous audio and video  Total time spent with patient: 14 minutes  Each patient to whom he or she provides medical services by telemedicine is:  (1) informed of the relationship between the physician and patient and the respective role of any other health care provider with respect to management of the patient; and (2) notified that he or she may decline to receive medical services by telemedicine and may withdraw from such care at any time.    CC: Neck and bilateral arm pain, back pain, knee pain     HPI: The patient is a 64-year-old woman with a history of hypertension, diabetes, multiple sclerosis, lumbar postlaminectomy syndrome who presents in self-referral for continued low back pain and bilateral leg pain, neck pain.  She presents in follow-up today for a virtual visit.  She recently saw Dr. Chace warner, reports having MRIs, CT and bone density.  She states that he told her she needs surgery but she may not make it through a surgery.  She stated that he wants a specific injection for her that Dr. Seaman can do.  We have not received these records yet.  She states she can barely take 5 steps secondary to low back and bilateral leg pain.  She states that she went to her multiple sclerosis doctor and is stable there.  She is requesting to increase her opioids by 1 more tablet per day.    Pain intervention history: She has been followed by one of our previous [facility] pain management physicians, Dr. Chapa in [place] until recently.  She had undergone fusion surgery in 1983 and again surgery in 1993 for her lumbar spine.  She had undergone numerous lumbar spine injections in the past with good relief.  She has tried gabapentin with only minimal relief but states that Lyrica works best.  Sounds like she had a spinal cord stimulator trial in the past that  did not help.  She has been taking hydrocodone up to 4 times a day for several years.  She is status post caudal epidural steroid injection on 8/6/14 with 60% relief.  She is status post caudal epidural steroid injection on 5/13/15 with 0% relief.  She is status post caudal epidural steroid injection on 10/2/15 with 0% relief.  She is status post C7-T1 cervical interlaminar epidural steroid injection on 9/7/16 with 100% relief of her bilateral arm pain and mild relief of her neck pain.  She is status post C7-T1 cervical interlaminar epidural steroid injection on 2/21/17 with at least 50% relief.  She is status post bilateral L1 transforaminal epidural steroid injections on 3/20/17 with 75% relief. She is status post C7-T1 cervical interlaminar epidural steroid injection on 12/22/17 and again on 2/9/18 with 90% relief of her neck and arm pain.  She is status post bilateral L1/2 transforaminal epidural steroid injections on 08/08/2018 with almost 100% relief of her low back pain.She is status post bilateral L1/2 transforaminal epidural steroid injections on 12/08/2020 with about 60% relief. She is status post C7-T1 interlaminar epidural steroid injection on 08/03/2021 with 0% relief.  She is status post bilateral L1/2 TF ROBIN on 01/27/2023 with no major benefit.    Spine surgeries: She had undergone fusion surgery in 1983 and again surgery in 1993 for her lumbar spine.     Antineuropathics:  Lyrica 75 in the morning and 150 at night  NSAIDs:  Physical therapy:  Antidepressants:  Cymbalta 60 daily, venlafaxine 225  Muscle relaxers:  Opioids:  Hydrocodone 10/325 4 times daily  Antiplatelets/Anticoagulants:    ROS:She reports urinary frequency, joint stiffness, joint swelling, back pain, memory loss, anxiety, depression, difficulty sleeping and loss of balance.  Balance of review of systems is negative.    Medical, surgical, family and social history reviewed elsewhere in record.    Medications/Allergies: See med  card    There were no vitals filed for this visit.      Physical exam:  Gen: A and O x3, tearful    Imagin20 MRI L spine  T12-L1: Mild disc osteophyte complex.  Prominent epidural fat.  Mild right and minimal left facet hypertrophy.  Compression of the thecal sac with minimal fluid surrounding the conus just above the T12 level.  This has progressed from prior study on 2017.  Moderate right and mild left foraminal stenosis.   L1-L2: Retrolisthesis.  Prominent epidural fat.  Spinal canal is somewhat obscured by artifact from hardware.  There appears to be moderate right and at least mild left narrowing of the lateral recesses with mild spinal canal stenosis.  This is progressed from prior study.  Foramina are partially obscured by artifact from hardware but there appears to be mild bilateral foraminal stenosis.   L2-L3: Fused.  No significant spinal canal stenosis.  Likely minimal bilateral foraminal stenosis.   L3-L4: Fused.  No significant spinal canal or foraminal stenosis.   L4-L5: Fused.  No significant spinal canal stenosis.  Foramina are partially obscured.  There appears to be mild-moderate right foraminal stenosis.   L5-S1: Fused.  Anterolisthesis.  Spinal canal is partially obscured but there appears to be no significant spinal canal stenosis.  Likely mild bilateral foraminal stenosis.    23 MRI C-spine:  Morphology: Vertebral body heights are preserved and marrow signal is within normal limits aside from minimal mixed fibrovascular and fatty degenerative endplate changes at C5-C6 in the setting of degenerative disc height loss discussed in further detail below..   Alignment: Redemonstrated grade 1 anterolisthesis of C3 on C4 and retrolisthesis of C5 on C6..   Cord: The cervical cord demonstrates normal signal intensity throughout its length.  Similar flattening of the right ventral cord at C5-C6..   Craniocervical Junction: Cerebellar tonsils are normally positioned. The visualized  portions of the posterior fossa are unremarkable. The regional osseous anatomy is normal.   Disc levels:   C2-C3: No disc herniation.  The spinal canal is patent.  Uncovertebral spurring and facet arthrosis produce mild right foraminal narrowing.  The left foramen is patent.  These findings are unchanged..   C3-C4: Spondylolisthesis and shallow disc osteophyte complex with ligamentum flavum thickening producing mild to moderate narrowing of the spinal canal.  Facet arthrosis contributes to severe right and moderate left foraminal narrowing similar to the prior exam..   C4-C5: Shallow broad-based disc osteophyte complex producing mild narrowing of the spinal canal.  Uncovertebral spurring and facet arthrosis contribute to mild-to-moderate right and mild left foraminal narrowing similar to the prior exam..   C5-C6: Moderate to severe degenerative disc height loss and broad-based disc osteophyte complex lateralizing to the right with ligamentum flavum thickening producing moderate to severe narrowing of the spinal canal also asymmetric to the right with cord flattening.  Uncovertebral spurring and facet arthrosis produce severe bilateral foraminal narrowing similar to the prior exam..   C6-C7: Shallow central disc osteophyte complex contributing to mild to moderate narrowing of the spinal canal.  Uncovertebral spurring and facet arthrosis produces moderate right and mild left foraminal narrowing.  These findings are unchanged..   C7-T1: Shallow central disc osteophyte complex minimally narrowing the central spinal canal.  Mild facet arthrosis bilaterally.  The foramina are patent..    Assessment:  The patient is a 64-year-old woman with a history of hypertension, diabetes, multiple sclerosis, lumbar postlaminectomy syndrome who presents in self-referral for continued low back pain and bilateral leg pain, neck pain.   1. Postlaminectomy syndrome of lumbar region        2. Lumbar radiculopathy        3. DDD (degenerative  disc disease), cervical        4. Chronic pain of both shoulders        5. Chronic pain of left knee        6. Multiple sclerosis        7. Opioid contract exists                                                                                                                                                                                                      Plan:  1.  Dr. Seaman provided prescriptions for hydrocodone-acetaminophen 10/325 mg up to 4 times a day as needed for pain and Lyrica 100 mg 3 times daily.  She will take 100 mg in the morning and 200 mg in the evening. I have reviewed the [place] Board of Pharmacy website and there are no abberancies.  She had requested to increase her hydrocodone from 4 tablets to 5 tablets per day.  I have reviewed this with Dr. Seaman and we do not suggest increasing opioids as this will only build further tolerance and dependence.  2. Once we have records from Dr. Silver we will schedule her for a procedure with Dr. Seaman.    3. Follow-up in 4 weeks postprocedure or sooner as needed.

## 2025-02-02 NOTE — H&P (VIEW-ONLY)
The patient location is: [facility]  The chief complaint leading to consultation is: multiple pain complaints  Visit type: Virtual visit with synchronous audio and video  Total time spent with patient: 14 minutes  Each patient to whom he or she provides medical services by telemedicine is:  (1) informed of the relationship between the physician and patient and the respective role of any other health care provider with respect to management of the patient; and (2) notified that he or she may decline to receive medical services by telemedicine and may withdraw from such care at any time.    CC: Neck and bilateral arm pain, back pain, knee pain     HPI: The patient is a 64-year-old woman with a history of hypertension, diabetes, multiple sclerosis, lumbar postlaminectomy syndrome who presents in self-referral for continued low back pain and bilateral leg pain, neck pain.  She presents in follow-up today for a virtual visit.  She recently saw Dr. Chace warner, reports having MRIs, CT and bone density.  She states that he told her she needs surgery but she may not make it through a surgery.  She stated that he wants a specific injection for her that Dr. Seaman can do.  We have not received these records yet.  She states she can barely take 5 steps secondary to low back and bilateral leg pain.  She states that she went to her multiple sclerosis doctor and is stable there.  She is requesting to increase her opioids by 1 more tablet per day.    Pain intervention history: She has been followed by one of our previous [facility] pain management physicians, Dr. Chapa in [place] until recently.  She had undergone fusion surgery in 1983 and again surgery in 1993 for her lumbar spine.  She had undergone numerous lumbar spine injections in the past with good relief.  She has tried gabapentin with only minimal relief but states that Lyrica works best.  Sounds like she had a spinal cord stimulator trial in the past that  did not help.  She has been taking hydrocodone up to 4 times a day for several years.  She is status post caudal epidural steroid injection on 8/6/14 with 60% relief.  She is status post caudal epidural steroid injection on 5/13/15 with 0% relief.  She is status post caudal epidural steroid injection on 10/2/15 with 0% relief.  She is status post C7-T1 cervical interlaminar epidural steroid injection on 9/7/16 with 100% relief of her bilateral arm pain and mild relief of her neck pain.  She is status post C7-T1 cervical interlaminar epidural steroid injection on 2/21/17 with at least 50% relief.  She is status post bilateral L1 transforaminal epidural steroid injections on 3/20/17 with 75% relief. She is status post C7-T1 cervical interlaminar epidural steroid injection on 12/22/17 and again on 2/9/18 with 90% relief of her neck and arm pain.  She is status post bilateral L1/2 transforaminal epidural steroid injections on 08/08/2018 with almost 100% relief of her low back pain.She is status post bilateral L1/2 transforaminal epidural steroid injections on 12/08/2020 with about 60% relief. She is status post C7-T1 interlaminar epidural steroid injection on 08/03/2021 with 0% relief.  She is status post bilateral L1/2 TF ROBIN on 01/27/2023 with no major benefit.    Spine surgeries: She had undergone fusion surgery in 1983 and again surgery in 1993 for her lumbar spine.     Antineuropathics:  Lyrica 75 in the morning and 150 at night  NSAIDs:  Physical therapy:  Antidepressants:  Cymbalta 60 daily, venlafaxine 225  Muscle relaxers:  Opioids:  Hydrocodone 10/325 4 times daily  Antiplatelets/Anticoagulants:    ROS:She reports urinary frequency, joint stiffness, joint swelling, back pain, memory loss, anxiety, depression, difficulty sleeping and loss of balance.  Balance of review of systems is negative.    Medical, surgical, family and social history reviewed elsewhere in record.    Medications/Allergies: See med  card    There were no vitals filed for this visit.      Physical exam:  Gen: A and O x3, tearful    Imagin20 MRI L spine  T12-L1: Mild disc osteophyte complex.  Prominent epidural fat.  Mild right and minimal left facet hypertrophy.  Compression of the thecal sac with minimal fluid surrounding the conus just above the T12 level.  This has progressed from prior study on 2017.  Moderate right and mild left foraminal stenosis.   L1-L2: Retrolisthesis.  Prominent epidural fat.  Spinal canal is somewhat obscured by artifact from hardware.  There appears to be moderate right and at least mild left narrowing of the lateral recesses with mild spinal canal stenosis.  This is progressed from prior study.  Foramina are partially obscured by artifact from hardware but there appears to be mild bilateral foraminal stenosis.   L2-L3: Fused.  No significant spinal canal stenosis.  Likely minimal bilateral foraminal stenosis.   L3-L4: Fused.  No significant spinal canal or foraminal stenosis.   L4-L5: Fused.  No significant spinal canal stenosis.  Foramina are partially obscured.  There appears to be mild-moderate right foraminal stenosis.   L5-S1: Fused.  Anterolisthesis.  Spinal canal is partially obscured but there appears to be no significant spinal canal stenosis.  Likely mild bilateral foraminal stenosis.    23 MRI C-spine:  Morphology: Vertebral body heights are preserved and marrow signal is within normal limits aside from minimal mixed fibrovascular and fatty degenerative endplate changes at C5-C6 in the setting of degenerative disc height loss discussed in further detail below..   Alignment: Redemonstrated grade 1 anterolisthesis of C3 on C4 and retrolisthesis of C5 on C6..   Cord: The cervical cord demonstrates normal signal intensity throughout its length.  Similar flattening of the right ventral cord at C5-C6..   Craniocervical Junction: Cerebellar tonsils are normally positioned. The visualized  portions of the posterior fossa are unremarkable. The regional osseous anatomy is normal.   Disc levels:   C2-C3: No disc herniation.  The spinal canal is patent.  Uncovertebral spurring and facet arthrosis produce mild right foraminal narrowing.  The left foramen is patent.  These findings are unchanged..   C3-C4: Spondylolisthesis and shallow disc osteophyte complex with ligamentum flavum thickening producing mild to moderate narrowing of the spinal canal.  Facet arthrosis contributes to severe right and moderate left foraminal narrowing similar to the prior exam..   C4-C5: Shallow broad-based disc osteophyte complex producing mild narrowing of the spinal canal.  Uncovertebral spurring and facet arthrosis contribute to mild-to-moderate right and mild left foraminal narrowing similar to the prior exam..   C5-C6: Moderate to severe degenerative disc height loss and broad-based disc osteophyte complex lateralizing to the right with ligamentum flavum thickening producing moderate to severe narrowing of the spinal canal also asymmetric to the right with cord flattening.  Uncovertebral spurring and facet arthrosis produce severe bilateral foraminal narrowing similar to the prior exam..   C6-C7: Shallow central disc osteophyte complex contributing to mild to moderate narrowing of the spinal canal.  Uncovertebral spurring and facet arthrosis produces moderate right and mild left foraminal narrowing.  These findings are unchanged..   C7-T1: Shallow central disc osteophyte complex minimally narrowing the central spinal canal.  Mild facet arthrosis bilaterally.  The foramina are patent..    Assessment:  The patient is a 64-year-old woman with a history of hypertension, diabetes, multiple sclerosis, lumbar postlaminectomy syndrome who presents in self-referral for continued low back pain and bilateral leg pain, neck pain.   1. Postlaminectomy syndrome of lumbar region        2. Lumbar radiculopathy        3. DDD (degenerative  disc disease), cervical        4. Chronic pain of both shoulders        5. Chronic pain of left knee        6. Multiple sclerosis        7. Opioid contract exists                                                                                                                                                                                                      Plan:  1.  Dr. Seaman provided prescriptions for hydrocodone-acetaminophen 10/325 mg up to 4 times a day as needed for pain and Lyrica 100 mg 3 times daily.  She will take 100 mg in the morning and 200 mg in the evening. I have reviewed the [place] Board of Pharmacy website and there are no abberancies.  She had requested to increase her hydrocodone from 4 tablets to 5 tablets per day.  I have reviewed this with Dr. Seaman and we do not suggest increasing opioids as this will only build further tolerance and dependence.  2. Once we have records from Dr. Silver we will schedule her for a procedure with Dr. Seaman.    3. Follow-up in 4 weeks postprocedure or sooner as needed.

## 2025-02-07 ENCOUNTER — HOSPITAL ENCOUNTER (OUTPATIENT)
Facility: HOSPITAL | Age: 65
Discharge: HOME | End: 2025-02-07
Attending: ANESTHESIOLOGY | Admitting: ANESTHESIOLOGY
Payer: MEDICARE

## 2025-02-07 ENCOUNTER — HOSPITAL ENCOUNTER (OUTPATIENT)
Dept: RADIOLOGY | Facility: HOSPITAL | Age: 65
Discharge: HOME | End: 2025-02-07
Attending: ANESTHESIOLOGY
Payer: MEDICARE

## 2025-02-07 VITALS
SYSTOLIC BLOOD PRESSURE: 156 MMHG | DIASTOLIC BLOOD PRESSURE: 71 MMHG | HEART RATE: 73 BPM | BODY MASS INDEX: 33.75 KG/M2 | WEIGHT: 210 LBS | RESPIRATION RATE: 15 BRPM | OXYGEN SATURATION: 97 % | TEMPERATURE: 98 F | HEIGHT: 66 IN

## 2025-02-07 DIAGNOSIS — M54.50 LOWER BACK PAIN: ICD-10-CM

## 2025-02-07 DIAGNOSIS — M54.16 LUMBAR RADICULOPATHY: ICD-10-CM

## 2025-02-07 LAB — GLUCOSE SERPL-MCNC: 98 MG/DL (ref 70–110)

## 2025-02-07 PROCEDURE — 62323 NJX INTERLAMINAR LMBR/SAC: CPT | Mod: ,,, | Performed by: ANESTHESIOLOGY

## 2025-02-07 PROCEDURE — 62323 NJX INTERLAMINAR LMBR/SAC: CPT | Mod: PO | Performed by: ANESTHESIOLOGY

## 2025-02-07 PROCEDURE — 25000003 PHARM REV CODE 250: Mod: PO | Performed by: ANESTHESIOLOGY

## 2025-02-07 PROCEDURE — 25500020 PHARM REV CODE 255: Mod: PO | Performed by: ANESTHESIOLOGY

## 2025-02-07 PROCEDURE — 63600175 PHARM REV CODE 636 W HCPCS: Mod: PO | Performed by: ANESTHESIOLOGY

## 2025-02-07 PROCEDURE — A4216 STERILE WATER/SALINE, 10 ML: HCPCS | Mod: PO | Performed by: ANESTHESIOLOGY

## 2025-02-07 RX ORDER — SODIUM CHLORIDE 9 MG/ML
INJECTION, SOLUTION INTRAMUSCULAR; INTRAVENOUS; SUBCUTANEOUS
Status: DISCONTINUED | OUTPATIENT
Start: 2025-02-07 | End: 2025-02-07 | Stop reason: HOSPADM

## 2025-02-07 RX ORDER — METHYLPREDNISOLONE ACETATE 80 MG/ML
INJECTION, SUSPENSION INTRA-ARTICULAR; INTRALESIONAL; INTRAMUSCULAR; SOFT TISSUE
Status: DISCONTINUED | OUTPATIENT
Start: 2025-02-07 | End: 2025-02-07 | Stop reason: HOSPADM

## 2025-02-07 RX ORDER — ALPRAZOLAM 0.5 MG/1
1 TABLET, ORALLY DISINTEGRATING ORAL ONCE
Status: COMPLETED | OUTPATIENT
Start: 2025-02-07 | End: 2025-02-07

## 2025-02-07 RX ORDER — LIDOCAINE HYDROCHLORIDE 10 MG/ML
INJECTION, SOLUTION EPIDURAL; INFILTRATION; INTRACAUDAL; PERINEURAL
Status: DISCONTINUED | OUTPATIENT
Start: 2025-02-07 | End: 2025-02-07 | Stop reason: HOSPADM

## 2025-02-07 RX ADMIN — ALPRAZOLAM 1 MG: 0.5 TABLET, ORALLY DISINTEGRATING ORAL at 12:02

## 2025-02-07 NOTE — OP NOTE
PROCEDURE DATE: 2/7/2025    Lumbar Interlaminar Epidural Steroid Injection under Fluoroscopic Guidance, AP Approach.    Procedure:   Interlaminar epidural steroid injection at L1/2 under fluoroscopic guidance.    Diagnosis: lUMBAR Radiculopathy    pOSTOP DIAGNOSIS: sAME    Physician: Jeffry Seaman M.D.    Medications injected:80 mg methylprednisone with 4 ml of preservative free NaCl    Local anesthetic injected:    Lidocaine 1% 3 ml total    Sedation Medications: none    Estimated blood loss:  none    Complications:  none    Technique:  Time-out taken to identify patient and procedure prior to starting the procedure.  With the patient laying in a prone position, the area was prepped and draped in the usual sterile fashion using ChloraPrep and a fenestrated drape.  After determining the target level with an AP fluoroscopic view, local anesthetic was given using a 25-gauge 1.5 inch needle by raising a wheal and then infiltrating toward the interlaminar entry space.  A 3.5inch 20-gauge Touhy needle was introduced under AP fluoroscopic guidance to the interlaminar space of L1/2. Once the trajectory was established, the needle was visualized in the lateral view and advanced using loss of resistance technique. Once in the desired position, omnipaque contrast was injected to confirm placement and there was no vascular uptake nor intrathecal spread.  The medication was then injected slowly. The patient tolerated the procedure well.      The patient was monitored after the procedure.   They were given post-procedure and discharge instructions to follow at home.  The patient was discharged in a stable condition.

## 2025-02-07 NOTE — DISCHARGE SUMMARY
[facility] - Surgery  Discharge Note  Short Stay    Procedure(s) (LRB):  Injection-steroid-epidural-lumbar   L1/2 (N/A)      OUTCOME: Patient tolerated treatment/procedure well without complication and is now ready for discharge.    DISPOSITION: Home or Self Care    FINAL DIAGNOSIS:  Lumbar radiculopathy    FOLLOWUP: In clinic    DISCHARGE INSTRUCTIONS:    Discharge Procedure Orders   Diet Adult Regular     No dressing needed     Notify your health care provider if you experience any of the following:  temperature >100.4     Activity as tolerated

## 2025-02-25 ENCOUNTER — TELEPHONE (OUTPATIENT)
Dept: PAIN MEDICINE | Facility: CLINIC | Age: 65
End: 2025-02-25
Payer: MEDICARE

## 2025-02-25 DIAGNOSIS — Z79.891 OPIOID CONTRACT EXISTS: ICD-10-CM

## 2025-02-25 RX ORDER — HYDROCODONE BITARTRATE AND ACETAMINOPHEN 10; 325 MG/1; MG/1
1 TABLET ORAL EVERY 6 HOURS PRN
Qty: 120 TABLET | Refills: 0 | Status: SHIPPED | OUTPATIENT
Start: 2025-03-03 | End: 2025-04-02

## 2025-02-25 NOTE — TELEPHONE ENCOUNTER
Spoke with patient and she's requesting if she could get refill of norco filled on 3/1 because we moved her February script up to 2/1

## 2025-02-25 NOTE — TELEPHONE ENCOUNTER
"----- Message from Alem sent at 2/25/2025 10:25 AM CST -----  Contact: pt  Type: Needs Medical AdviceWhjuliette Called: [facility] Call Back Number:[phone] Information: Requesting a call back regarding  pharm is stating that the rx for HYDROcodone-acetaminophen (NORCO)  mg per tablet is eligible to be filled on 03/01 but the rx states Do not fill until 03/04 and that puts her 3 days late on her Rx.  Pt asking for the office to send pharm new Rx and auth her fill to be back to the 1st of the [facility].  Last rx was changed due to [facility] closed on the original fill date.   Pt unable to use "[facility]" and asking for a call please. [place] [address]Phone: [phone] Fax: [phone] Advise- Thank you  ----- Message -----  From: Alem Montiel  Sent: 2/25/2025  10:30 AM CST  To: Jurgen Savage    Type: Needs Medical AdviceWhjuliette Called: [facility] Call Back Number:[phone] Information: Requesting a call back regarding  pharm is stating that the rx for HYDROcodone-acetaminophen (NORCO)  mg per tablet is eligible to be filled on 03/01 but the rx states Do not fill until 03/04 and that puts her 3 days late on her Rx.  Pt asking for the office to call pharm and auth her fill to be back to the 1st of the [facility].  Last rx was changed due to [facility] closed on the original fill date.[place] [address]Phone: [phone] Fax: [phone] Advise- Thank you  "

## 2025-02-25 NOTE — TELEPHONE ENCOUNTER
----- Message from Yanique sent at 2/25/2025  2:52 PM CST -----  Type:  Patient Returning CallWho Called: PTYoelo Left Message for Patient: EmmyAubrey the patient know what this is regarding?:Would the patient rather a call back or a response via [facility]? [facility] [phone]:/[phone] Additional Information:  Please call back to advise. Thank you!

## 2025-03-05 ENCOUNTER — TELEPHONE (OUTPATIENT)
Dept: PAIN MEDICINE | Facility: CLINIC | Age: 65
End: 2025-03-05
Payer: MEDICARE

## 2025-03-05 NOTE — TELEPHONE ENCOUNTER
Spoke with Francisco and he said this patient needs to be seen in clinic since it's been a year since her last in person visit. Spoke with patient and let her know she needs to come into clinic

## 2025-03-05 NOTE — TELEPHONE ENCOUNTER
----- Message from Meir sent at 3/5/2025  4:04 PM CST -----  Contact: self  Type: Needs Medical AdviceWhjuliette Called:  [facility] Call Back Number: [phone] Additional Information: PT would like to see if she can switch tomorrows appt to a virtual.  Please call to advise.  Please do not send portal msg.  ----- Message -----  From: Meir Dockery  Sent: 3/5/2025   4:05 PM CST  To: [facility] Staff    Type: Needs Medical AdviceWhjuliette Called:  [facility] Call Back Number: [phone] Additional Information: PT would like to see if she can switch tomorrows appt to a virtual.  Please call to advise.

## 2025-03-06 ENCOUNTER — TELEPHONE (OUTPATIENT)
Dept: PAIN MEDICINE | Facility: CLINIC | Age: 65
End: 2025-03-06
Payer: MEDICARE

## 2025-03-06 NOTE — TELEPHONE ENCOUNTER
----- Message from Alberta sent at 3/6/2025  8:23 AM CST -----  Type: Needs Medical AdviceWho Called:  Patient Symptoms (please be specific):  How long has patient had these symptoms:  Pharmacy name and phone #:  [facility] Call Back Number: [phone] Information: Patient is requesting a call back from Rashmi. Patient stated she will not make it in today due to high pain level.

## 2025-03-06 NOTE — TELEPHONE ENCOUNTER
Spoke with patient and rescheduled in person appointment with Francisco. Francisco would like her next appointment to be in person, not virtual

## 2025-03-07 ENCOUNTER — PATIENT MESSAGE (OUTPATIENT)
Dept: PSYCHIATRY | Facility: CLINIC | Age: 65
End: 2025-03-07
Payer: MEDICARE

## 2025-03-14 DIAGNOSIS — M54.16 LUMBAR RADICULOPATHY, CHRONIC: ICD-10-CM

## 2025-03-14 DIAGNOSIS — G35 MULTIPLE SCLEROSIS: Primary | ICD-10-CM

## 2025-03-14 RX ORDER — PREGABALIN 100 MG/1
CAPSULE ORAL
Qty: 90 CAPSULE | Refills: 2 | Status: SHIPPED | OUTPATIENT
Start: 2025-03-14

## 2025-03-25 ENCOUNTER — HOSPITAL ENCOUNTER (OUTPATIENT)
Dept: RADIOLOGY | Facility: HOSPITAL | Age: 65
Discharge: HOME | End: 2025-03-25
Attending: PHYSICIAN ASSISTANT
Payer: MEDICARE

## 2025-03-25 ENCOUNTER — OFFICE VISIT (OUTPATIENT)
Dept: PAIN MEDICINE | Facility: CLINIC | Age: 65
End: 2025-03-25
Payer: MEDICARE

## 2025-03-25 VITALS
HEIGHT: 66 IN | HEART RATE: 73 BPM | BODY MASS INDEX: 33.91 KG/M2 | DIASTOLIC BLOOD PRESSURE: 72 MMHG | WEIGHT: 211 LBS | SYSTOLIC BLOOD PRESSURE: 167 MMHG

## 2025-03-25 DIAGNOSIS — R22.31 MASS OF SKIN OF RIGHT SHOULDER: ICD-10-CM

## 2025-03-25 DIAGNOSIS — M25.511 CHRONIC RIGHT SHOULDER PAIN: Primary | ICD-10-CM

## 2025-03-25 DIAGNOSIS — M25.512 CHRONIC PAIN OF BOTH SHOULDERS: ICD-10-CM

## 2025-03-25 DIAGNOSIS — M25.511 CHRONIC PAIN OF BOTH SHOULDERS: ICD-10-CM

## 2025-03-25 DIAGNOSIS — Z79.891 OPIOID CONTRACT EXISTS: ICD-10-CM

## 2025-03-25 DIAGNOSIS — G89.29 CHRONIC RIGHT SHOULDER PAIN: ICD-10-CM

## 2025-03-25 DIAGNOSIS — G89.29 CHRONIC PAIN OF BOTH SHOULDERS: ICD-10-CM

## 2025-03-25 DIAGNOSIS — M25.511 CHRONIC RIGHT SHOULDER PAIN: ICD-10-CM

## 2025-03-25 DIAGNOSIS — G89.29 CHRONIC RIGHT SHOULDER PAIN: Primary | ICD-10-CM

## 2025-03-25 PROCEDURE — 80364 OPIOID &OPIATE ANALOG 5/MORE: CPT | Performed by: PHYSICIAN ASSISTANT

## 2025-03-25 PROCEDURE — 99215 OFFICE O/P EST HI 40 MIN: CPT | Mod: S$GLB,,, | Performed by: PHYSICIAN ASSISTANT

## 2025-03-25 PROCEDURE — 73030 X-RAY EXAM OF SHOULDER: CPT | Mod: TC,PO,RT

## 2025-03-25 PROCEDURE — 99999 PR PBB SHADOW E&M-EST. PATIENT-LVL V: CPT | Mod: PBBFAC,,, | Performed by: PHYSICIAN ASSISTANT

## 2025-03-25 PROCEDURE — 73030 X-RAY EXAM OF SHOULDER: CPT | Mod: 26,RT,, | Performed by: STUDENT IN AN ORGANIZED HEALTH CARE EDUCATION/TRAINING PROGRAM

## 2025-03-25 PROCEDURE — 99215 OFFICE O/P EST HI 40 MIN: CPT | Mod: PBBFAC,25,PO | Performed by: PHYSICIAN ASSISTANT

## 2025-03-26 ENCOUNTER — TELEPHONE (OUTPATIENT)
Dept: PAIN MEDICINE | Facility: CLINIC | Age: 65
End: 2025-03-26
Payer: MEDICARE

## 2025-03-26 NOTE — TELEPHONE ENCOUNTER
----- Message from Alem sent at 3/26/2025  8:53 AM CDT -----  Contact: pt  Type: Needs Medical Advice Who Called:Julianne Call Back Number:[phone] Information: Requesting a call back regarding pt returned office call and asking for a call back please Please Advise- Thank you

## 2025-03-26 NOTE — TELEPHONE ENCOUNTER
Please let the patient know I reviewed her case with Dr. Seaman and once we have her urine drug screen results we will be in touch in regards to her pain medication.

## 2025-03-26 NOTE — TELEPHONE ENCOUNTER
Spoke with patient and let her know Francisco would be in contact about her pain medicine once we have the results back from her urine drug screen

## 2025-03-27 NOTE — TELEPHONE ENCOUNTER

## 2025-03-30 LAB
1OH-MIDAZOLAM UR QL SCN: NOT DETECTED
6MAM UR QL: NOT DETECTED
7AMINOCLONAZEPAM UR QL: NOT DETECTED
A-OH ALPRAZ UR QL: NOT DETECTED
ALPRAZ UR QL: NOT DETECTED
AMPHET UR QL SCN: NOT DETECTED
ANNOTATION COMMENT IMP: NORMAL
AR NOROXYMORPHONE (CUTOFF 100 NG/ML): NOT DETECTED
AR OXYMORPHONE (CUTOFF 40 NG/ML): NOT DETECTED
BARBITURATES UR QL: NEGATIVE
BUPRENORPHINE UR QL: NOT DETECTED
BZE UR QL: NEGATIVE
CARBOXYTHC UR QL: NEGATIVE
CARISOPRODOL UR QL: NEGATIVE
CLONAZEPAM UR QL: NOT DETECTED
CODEINE UR QL: NOT DETECTED
CREAT UR-MCNC: <20 MG/DL
DIAZEPAM UR QL: NOT DETECTED
ETHYL GLUCURONIDE UR QL: NEGATIVE
FENTANYL UR QL: NOT DETECTED
GABAPENTIN UR QL CFM: NOT DETECTED
HYDROCODONE UR QL: PRESENT
HYDROMORPHONE UR QL: PRESENT
LORAZEPAM UR QL: NOT DETECTED
MDA UR QL: NOT DETECTED
MDEA UR QL: NOT DETECTED
MDMA UR QL: NOT DETECTED
ME-PHENIDATE UR QL: NOT DETECTED
METHADONE UR QL: NEGATIVE
METHAMPHET UR QL: NOT DETECTED
MIDAZOLAM UR QL SCN: NOT DETECTED
MORPHINE UR QL: NOT DETECTED
NALOXONE UR QL CFM: NOT DETECTED
NORBUPRENORPHINE UR QL CFM: NOT DETECTED
NORDIAZEPAM UR QL: NOT DETECTED
NORFENTANYL UR QL: NOT DETECTED
NORMEPERIDINE UR QL CFM: NOT DETECTED
NOROXYCODONE UR QL CFM: PRESENT
NOROXYMORPHONE UR QL SCN: NOT DETECTED
OXAZEPAM UR QL: NOT DETECTED
OXYCODONE UR QL: NOT DETECTED
PATHOLOGY STUDY: NORMAL
PCP UR QL: NEGATIVE
PHENTERMINE UR QL: NOT DETECTED
PREGABALIN UR QL CFM: PRESENT
SERVICE CMNT-IMP: NORMAL
TAPENTADOL UR QL SCN: NOT DETECTED
TAPENTADOL UR QL SCN: NOT DETECTED
TEMAZEPAM UR QL: NOT DETECTED
TRAMADOL UR QL: NEGATIVE
ZOLPIDEM PHENYL-4-CARB UR QL SCN: NOT DETECTED
ZOLPIDEM UR QL: NOT DETECTED

## 2025-04-01 ENCOUNTER — PATIENT MESSAGE (OUTPATIENT)
Dept: PAIN MEDICINE | Facility: CLINIC | Age: 65
End: 2025-04-01
Payer: MEDICAID

## 2025-04-01 DIAGNOSIS — G89.4 CHRONIC PAIN DISORDER: Primary | ICD-10-CM

## 2025-04-01 NOTE — TELEPHONE ENCOUNTER
Please let the patient know I reviewed her case with Dr. Seaman and he is going to change her hydrocodone to Percocet.  Since she is living closer now does she want the medication going to a local pharmacy instead of [facility]?

## 2025-04-01 NOTE — PROGRESS NOTES
CC: Neck and bilateral arm pain, back pain, knee pain     HPI: The patient is a 64-year-old woman with a history of hypertension, diabetes, multiple sclerosis, lumbar postlaminectomy syndrome who presents in self-referral for continued low back pain and bilateral leg pain, neck pain.  She is status post L1/2 interlaminar epidural steroid injection on 02/07/2025 with 0% relief.  She continues to complain of severe low back pain radiating into her legs.  She is also planning of multiple joint pain including knees, hip and especially her right shoulder.  She reports a mass on her right shoulder that was drained by an orthopedic surgeon last year.  She states this filled back up within hours.  She did not follow-up due to the death of her .  She is now staying closer with family and would like to see orthopedics here.  She is requesting a change to her pain medication.  Her daughter-in-law is present today.    Pain intervention history: She has been followed by one of our previous [facility] pain management physicians, Dr. Chapa in [place] until recently.  She had undergone fusion surgery in 1983 and again surgery in 1993 for her lumbar spine.  She had undergone numerous lumbar spine injections in the past with good relief.  She has tried gabapentin with only minimal relief but states that Lyrica works best.  Sounds like she had a spinal cord stimulator trial in the past that did not help.  She has been taking hydrocodone up to 4 times a day for several years.  She is status post caudal epidural steroid injection on 8/6/14 with 60% relief.  She is status post caudal epidural steroid injection on 5/13/15 with 0% relief.  She is status post caudal epidural steroid injection on 10/2/15 with 0% relief.  She is status post C7-T1 cervical interlaminar epidural steroid injection on 9/7/16 with 100% relief of her bilateral arm pain and mild relief of her neck pain.  She is status post C7-T1 cervical interlaminar  "epidural steroid injection on 2/21/17 with at least 50% relief.  She is status post bilateral L1 transforaminal epidural steroid injections on 3/20/17 with 75% relief. She is status post C7-T1 cervical interlaminar epidural steroid injection on 12/22/17 and again on 2/9/18 with 90% relief of her neck and arm pain.  She is status post bilateral L1/2 transforaminal epidural steroid injections on 08/08/2018 with almost 100% relief of her low back pain.She is status post bilateral L1/2 transforaminal epidural steroid injections on 12/08/2020 with about 60% relief. She is status post C7-T1 interlaminar epidural steroid injection on 08/03/2021 with 0% relief.  She is status post bilateral L1/2 TF ROBIN on 01/27/2023 with no major benefit.  She is status post L1/2 interlaminar epidural steroid injection on 02/07/2025 with 0% relief.     Spine surgeries: She had undergone fusion surgery in 1983 and again surgery in 1993 for her lumbar spine.     Antineuropathics:  Lyrica 75 in the morning and 150 at night  NSAIDs:  Physical therapy:  Antidepressants:  Cymbalta 60 daily, venlafaxine 225  Muscle relaxers:  Opioids:  Hydrocodone 10/325 4 times daily  Antiplatelets/Anticoagulants:    ROS:She reports urinary frequency, joint stiffness, joint swelling, back pain, memory loss, anxiety, depression, difficulty sleeping and loss of balance.  Balance of review of systems is negative.    Medical, surgical, family and social history reviewed elsewhere in record.    Medications/Allergies: See med card    Vitals:    03/25/25 1332   BP: (!) 167/72   Pulse: 73   Weight: 95.7 kg (210 lb 15.7 oz)   Height: 5' 5.5" (1.664 m)   PainSc: 10-Worst pain ever   PainLoc: Back         Physical exam:  Gen: A and O x3, pleasant, well-groomed  Skin: No rashes or obvious lesions  HEENT: PERRLA, no obvious deformities on ears or in canals. Trachea midline.  CVS: Regular rate and rhythm, normal palpable pulses.  Resp:No increased work of breathing, " symmetrical chest rise.  Abdomen: Soft, NT/ND.  Musculoskeletal:  Presents in a wheelchair    Neuro:    Iliopsoas Quadriceps Knee  Flexion Tibialis  anterior Gastro- cnemius EHL   Lower: R 4/5 4/5 4/5 4/5 4/5 4/5    L 4/5 4/5 4/5 4/5 4/5 4/5     She has a tender mass on the right shoulder, upon palpation likely cystic.    Imagin20 MRI L spine  T12-L1: Mild disc osteophyte complex.  Prominent epidural fat.  Mild right and minimal left facet hypertrophy.  Compression of the thecal sac with minimal fluid surrounding the conus just above the T12 level.  This has progressed from prior study on 2017.  Moderate right and mild left foraminal stenosis.   L1-L2: Retrolisthesis.  Prominent epidural fat.  Spinal canal is somewhat obscured by artifact from hardware.  There appears to be moderate right and at least mild left narrowing of the lateral recesses with mild spinal canal stenosis.  This is progressed from prior study.  Foramina are partially obscured by artifact from hardware but there appears to be mild bilateral foraminal stenosis.   L2-L3: Fused.  No significant spinal canal stenosis.  Likely minimal bilateral foraminal stenosis.   L3-L4: Fused.  No significant spinal canal or foraminal stenosis.   L4-L5: Fused.  No significant spinal canal stenosis.  Foramina are partially obscured.  There appears to be mild-moderate right foraminal stenosis.   L5-S1: Fused.  Anterolisthesis.  Spinal canal is partially obscured but there appears to be no significant spinal canal stenosis.  Likely mild bilateral foraminal stenosis.    23 MRI C-spine:  Morphology: Vertebral body heights are preserved and marrow signal is within normal limits aside from minimal mixed fibrovascular and fatty degenerative endplate changes at C5-C6 in the setting of degenerative disc height loss discussed in further detail below..   Alignment: Redemonstrated grade 1 anterolisthesis of C3 on C4 and retrolisthesis of C5 on C6..   Cord:  The cervical cord demonstrates normal signal intensity throughout its length.  Similar flattening of the right ventral cord at C5-C6..   Craniocervical Junction: Cerebellar tonsils are normally positioned. The visualized portions of the posterior fossa are unremarkable. The regional osseous anatomy is normal.   Disc levels:   C2-C3: No disc herniation.  The spinal canal is patent.  Uncovertebral spurring and facet arthrosis produce mild right foraminal narrowing.  The left foramen is patent.  These findings are unchanged..   C3-C4: Spondylolisthesis and shallow disc osteophyte complex with ligamentum flavum thickening producing mild to moderate narrowing of the spinal canal.  Facet arthrosis contributes to severe right and moderate left foraminal narrowing similar to the prior exam..   C4-C5: Shallow broad-based disc osteophyte complex producing mild narrowing of the spinal canal.  Uncovertebral spurring and facet arthrosis contribute to mild-to-moderate right and mild left foraminal narrowing similar to the prior exam..   C5-C6: Moderate to severe degenerative disc height loss and broad-based disc osteophyte complex lateralizing to the right with ligamentum flavum thickening producing moderate to severe narrowing of the spinal canal also asymmetric to the right with cord flattening.  Uncovertebral spurring and facet arthrosis produce severe bilateral foraminal narrowing similar to the prior exam..   C6-C7: Shallow central disc osteophyte complex contributing to mild to moderate narrowing of the spinal canal.  Uncovertebral spurring and facet arthrosis produces moderate right and mild left foraminal narrowing.  These findings are unchanged..   C7-T1: Shallow central disc osteophyte complex minimally narrowing the central spinal canal.  Mild facet arthrosis bilaterally.  The foramina are patent..    Assessment:  The patient is a 64-year-old woman with a history of hypertension, diabetes, multiple sclerosis, lumbar  postlaminectomy syndrome who presents in self-referral for continued low back pain and bilateral leg pain, neck pain.   1. Chronic right shoulder pain  Ambulatory referral/consult to Orthopedics    X-ray Shoulder 2 or More Views Right      2. Mass of skin of right shoulder  Ambulatory referral/consult to Orthopedics    X-ray Shoulder 2 or More Views Right      3. Opioid contract exists  POCT Urine Drug Screen Pain Management    Pain Clinic Drug Screen      4. Chronic pain of both shoulders  POCT Urine Drug Screen Pain Management    Pain Clinic Drug Screen                                                                                                                                                                                                    Plan:  1. For her right shoulder mass I have obtained an x-ray and reviewed this.  We will get her set up with 1 of our orthopedic surgeons to evaluate further in the near future.  2. Unfortunately she did not have relief following the epidural steroid injection.  She had seen a spine surgeon, Dr. Silver who recommended against surgery.  3. I have reviewed her case with Dr. Seaman.  She is requesting a change to her opioid medication due to this not helping her as much as it did in the past.  We have collected a urine drug screen today and when the results are returned I will review with Dr. Seaman and we will contact the patient.  4. Follow-up in 3 months or sooner as needed.    The total time spent for evaluation and management today including reviewing separately obtained history, performing a medically appropriate exam and evaluation, documenting clinical information in the health record, independently interpreting results and communicating them to the patient/family/caregiver, and ordering medications/tests/procedures was between 40-54 minutes.

## 2025-04-02 ENCOUNTER — TELEPHONE (OUTPATIENT)
Dept: PAIN MEDICINE | Facility: CLINIC | Age: 65
End: 2025-04-02
Payer: MEDICAID

## 2025-04-02 RX ORDER — OXYCODONE AND ACETAMINOPHEN 10; 325 MG/1; MG/1
1 TABLET ORAL EVERY 6 HOURS PRN
Qty: 120 TABLET | Refills: 0 | Status: SHIPPED | OUTPATIENT
Start: 2025-06-01 | End: 2025-07-01

## 2025-04-02 RX ORDER — OXYCODONE AND ACETAMINOPHEN 10; 325 MG/1; MG/1
1 TABLET ORAL EVERY 6 HOURS PRN
Qty: 120 TABLET | Refills: 0 | Status: SHIPPED | OUTPATIENT
Start: 2025-04-02 | End: 2025-05-02

## 2025-04-02 RX ORDER — OXYCODONE AND ACETAMINOPHEN 10; 325 MG/1; MG/1
1 TABLET ORAL EVERY 6 HOURS PRN
Qty: 120 TABLET | Refills: 0 | Status: SHIPPED | OUTPATIENT
Start: 2025-05-02 | End: 2025-06-01

## 2025-04-02 NOTE — TELEPHONE ENCOUNTER
Spoke with patient and relayed Francisco's message. She still uses [facility] so it can be sent there

## 2025-04-02 NOTE — TELEPHONE ENCOUNTER
Pharmacy called and said patient already filled the norco yesterday. Do they need to bring it back before she can have the oxycodone?

## 2025-04-02 NOTE — TELEPHONE ENCOUNTER
----- Message from Meghan sent at 4/2/2025 11:12 AM CDT -----  Regarding: pharmacy  Contact: Yumiko with [facility]  Type:  Pharmacy Calling to Clarify an RXName of Caller:  Fritz Name:  ChaitanyaPrescription Name:  HYDROcodone-acetaminophen (NORCO)  mg per tabletWhat do they need to clarify?:  patient filled prescription yesterday and has another one todayBest Call Back Number:  [phone] Information:  Please call Yumiko to advise.  Thanks!

## 2025-04-02 NOTE — TELEPHONE ENCOUNTER
Spoke with pharmacy and they said she can bring the hydrocodone back and they will destroy it so that she can get the oxycodone refill. Spoke with patient and let her know she could bring the medication back to get the oxycodone so she is going to do that

## 2025-04-07 ENCOUNTER — OFFICE VISIT (OUTPATIENT)
Dept: ORTHOPEDICS | Facility: CLINIC | Age: 65
End: 2025-04-07
Payer: MEDICARE

## 2025-04-07 ENCOUNTER — PATIENT MESSAGE (OUTPATIENT)
Dept: PAIN MEDICINE | Facility: CLINIC | Age: 65
End: 2025-04-07
Payer: MEDICAID

## 2025-04-07 VITALS — HEIGHT: 66 IN | WEIGHT: 211 LBS | BODY MASS INDEX: 33.91 KG/M2

## 2025-04-07 DIAGNOSIS — M19.012 ARTHRITIS OF BOTH SHOULDERS: Primary | ICD-10-CM

## 2025-04-07 DIAGNOSIS — G89.29 CHRONIC RIGHT SHOULDER PAIN: ICD-10-CM

## 2025-04-07 DIAGNOSIS — M19.011 ARTHRITIS OF BOTH SHOULDERS: Primary | ICD-10-CM

## 2025-04-07 DIAGNOSIS — M25.511 CHRONIC RIGHT SHOULDER PAIN: ICD-10-CM

## 2025-04-07 DIAGNOSIS — R22.31 MASS OF SKIN OF RIGHT SHOULDER: ICD-10-CM

## 2025-04-07 PROCEDURE — 99999 PR PBB SHADOW E&M-EST. PATIENT-LVL IV: CPT | Mod: PBBFAC,,, | Performed by: ORTHOPAEDIC SURGERY

## 2025-04-07 NOTE — PROCEDURES
Large Joint Aspiration/Injection: R subacromial bursa    Date/Time: 4/7/2025 1:30 PM    Performed by: Arron Bay MD  Authorized by: Arron Bay MD    Consent Done?:  Yes (Verbal)  Site marked: the procedure site was marked    Prep: patient was prepped and draped in usual sterile fashion      Details:  Needle Size:  21 G  Approach:  Posterior  Location:  Shoulder  Site:  R subacromial bursa  Patient tolerance:  Patient tolerated the procedure well with no immediate complications

## 2025-04-07 NOTE — PROGRESS NOTES
65 years old pain and swelling of her right shoulder for several years time.  Reports have had her shoulder drained in the past requesting have that done today rates pain is 6 on the pain scale    Exam shows she presents in a wheelchair, she has a right shoulder joint effusion identified anteriorly does not appear to be infected in his functioning well     X-rays show end-stage arthrosis     Assessment: Right shoulder arthrosis with joint effusion     Plan we aspirated got 60 cc of clear joint fluid.  We discussed with the patient conservative care as well as shoulder arthroplasty, follow-up as needed

## 2025-04-08 ENCOUNTER — TELEPHONE (OUTPATIENT)
Dept: PAIN MEDICINE | Facility: CLINIC | Age: 65
End: 2025-04-08
Payer: MEDICAID

## 2025-04-08 NOTE — TELEPHONE ENCOUNTER
03/25/2025 urine drug screen   Diluted specimen   Positive and consistent for hydrocodone and its metabolites  Positive and consistent for Lyrica

## 2025-05-08 ENCOUNTER — TELEPHONE (OUTPATIENT)
Dept: ENDOSCOPY | Facility: HOSPITAL | Age: 65
End: 2025-05-08
Payer: MEDICARE

## 2025-05-08 ENCOUNTER — TELEPHONE (OUTPATIENT)
Dept: GASTROENTEROLOGY | Facility: CLINIC | Age: 65
End: 2025-05-08
Payer: MEDICARE

## 2025-05-08 NOTE — TELEPHONE ENCOUNTER
----- Message from Mary Ann sent at 5/8/2025  2:46 PM CDT -----  Contact: lita Franco  5/8/25Joan Bahena called to check on the status of referral sent 05/7/25- scanned into media mgr, EUS clinic- please call pt   [phone]

## 2025-05-08 NOTE — TELEPHONE ENCOUNTER
----- Message from Aishwarya sent at 5/8/2025  9:42 AM CDT -----  Regarding: FW: EUS/ERCP Referral    ----- Message -----  From: Marisa Zambrano  Sent: 5/7/2025   1:49 PM CDT  To: [facility] Schedulers  Subject: EUS/ERCP Referral                                Good afternoon,Provider Gladis Ruth would like to refer the patient for EUS/ERCPI have scanned the patients referral and records into . Please review and contact patient to schedule appointment. Thank you, Beverly Meraz

## 2025-05-09 ENCOUNTER — TELEPHONE (OUTPATIENT)
Dept: ENDOSCOPY | Facility: HOSPITAL | Age: 65
End: 2025-05-09
Payer: MEDICARE

## 2025-05-09 NOTE — TELEPHONE ENCOUNTER
Per Dr. Silver review,positive biliary brushings for cancer. The stent they had previously placed biliary stent has migrated out. Would be good if we can get her scheduled in the next 5-7 days for a quick EUS (to see if this is pancreatic or biliary origin) and ERCP for metal stent.

## 2025-05-12 ENCOUNTER — TELEPHONE (OUTPATIENT)
Dept: ENDOSCOPY | Facility: HOSPITAL | Age: 65
End: 2025-05-12
Payer: MEDICARE

## 2025-05-12 NOTE — TELEPHONE ENCOUNTER
Telephone patient to schedule urgent EUS/ERCP with no answer. Direct contact given to call back to schedule procedure. Portal message sent.

## 2025-05-13 ENCOUNTER — PATIENT MESSAGE (OUTPATIENT)
Dept: PSYCHIATRY | Facility: CLINIC | Age: 65
End: 2025-05-13
Payer: MEDICARE

## 2025-05-19 DIAGNOSIS — K83.1 COMMON BILE DUCT (CBD) STRICTURE: Primary | ICD-10-CM

## 2025-05-20 ENCOUNTER — TELEPHONE (OUTPATIENT)
Dept: HEMATOLOGY/ONCOLOGY | Facility: CLINIC | Age: 65
End: 2025-05-20
Payer: MEDICARE

## 2025-05-21 ENCOUNTER — TELEPHONE (OUTPATIENT)
Dept: HEMATOLOGY/ONCOLOGY | Facility: CLINIC | Age: 65
End: 2025-05-21
Payer: MEDICARE

## 2025-05-22 ENCOUNTER — TELEPHONE (OUTPATIENT)
Dept: PAIN MEDICINE | Facility: CLINIC | Age: 65
End: 2025-05-22
Payer: MEDICARE

## 2025-05-22 NOTE — TELEPHONE ENCOUNTER
Left patient voicemail to reschedule booked out appointment on 6/26. Let her know of new appointment time on 6/13 at 10:45am

## 2025-05-23 ENCOUNTER — TELEPHONE (OUTPATIENT)
Dept: HEMATOLOGY/ONCOLOGY | Facility: CLINIC | Age: 65
End: 2025-05-23
Payer: MEDICARE

## 2025-05-26 ENCOUNTER — TELEPHONE (OUTPATIENT)
Dept: HEMATOLOGY/ONCOLOGY | Facility: CLINIC | Age: 65
End: 2025-05-26
Payer: MEDICARE

## 2025-05-30 ENCOUNTER — TELEPHONE (OUTPATIENT)
Dept: HEMATOLOGY/ONCOLOGY | Facility: CLINIC | Age: 65
End: 2025-05-30
Payer: MEDICARE

## 2025-05-30 ENCOUNTER — PATIENT MESSAGE (OUTPATIENT)
Dept: HEMATOLOGY/ONCOLOGY | Facility: CLINIC | Age: 65
End: 2025-05-30
Payer: MEDICARE

## 2025-05-30 NOTE — NURSING
Sent patient a message via portal and phone messages and scheduled appointment for 06/04/2025 with Dr. Jeffry Baker; Provided patient with appointment time and date, address of [facility]. Direct line to navigator provided. All questions and concerns addressed.

## 2025-06-02 ENCOUNTER — TELEPHONE (OUTPATIENT)
Dept: HEMATOLOGY/ONCOLOGY | Facility: CLINIC | Age: 65
End: 2025-06-02
Payer: MEDICARE

## 2025-06-02 DIAGNOSIS — C24.0 PRIMARY ADENOCARCINOMA OF COMMON BILE DUCT: Primary | ICD-10-CM

## 2025-06-03 ENCOUNTER — HOSPITAL ENCOUNTER (OUTPATIENT)
Dept: RADIOLOGY | Facility: HOSPITAL | Age: 65
Discharge: HOME | End: 2025-06-03
Attending: SURGERY
Payer: MEDICARE

## 2025-06-03 DIAGNOSIS — C24.0 PRIMARY ADENOCARCINOMA OF COMMON BILE DUCT: ICD-10-CM

## 2025-06-03 PROCEDURE — 71250 CT THORAX DX C-: CPT | Mod: 26,,, | Performed by: RADIOLOGY

## 2025-06-03 PROCEDURE — 71250 CT THORAX DX C-: CPT | Mod: TC,PO

## 2025-06-04 ENCOUNTER — NURSE TRIAGE (OUTPATIENT)
Dept: ADMINISTRATIVE | Facility: CLINIC | Age: 65
End: 2025-06-04
Payer: MEDICARE

## 2025-06-04 ENCOUNTER — TUMOR BOARD CONFERENCE (OUTPATIENT)
Dept: HEMATOLOGY/ONCOLOGY | Facility: CLINIC | Age: 65
End: 2025-06-04
Payer: MEDICARE

## 2025-06-04 ENCOUNTER — LAB VISIT (OUTPATIENT)
Dept: LAB | Facility: HOSPITAL | Age: 65
End: 2025-06-04
Attending: SURGERY
Payer: MEDICARE

## 2025-06-04 DIAGNOSIS — C24.0 PRIMARY ADENOCARCINOMA OF COMMON BILE DUCT: ICD-10-CM

## 2025-06-04 LAB
ABSOLUTE NEUTROPHIL MANUAL (OHS): 7.3 K/UL
ALBUMIN SERPL BCP-MCNC: 2.4 G/DL (ref 3.5–5.2)
ALP SERPL-CCNC: 516 UNIT/L (ref 40–150)
ALT SERPL W/O P-5'-P-CCNC: 86 UNIT/L (ref 10–44)
ANION GAP (OHS): 15 MMOL/L (ref 8–16)
ANISOCYTOSIS BLD QL SMEAR: SLIGHT
AST SERPL-CCNC: 109 UNIT/L (ref 11–45)
BILIRUB SERPL-MCNC: 16.4 MG/DL (ref 0.1–1)
BUN SERPL-MCNC: 12 MG/DL (ref 8–23)
CALCIUM SERPL-MCNC: 8.1 MG/DL (ref 8.7–10.5)
CHLORIDE SERPL-SCNC: 97 MMOL/L (ref 95–110)
CO2 SERPL-SCNC: 20 MMOL/L (ref 23–29)
CREAT SERPL-MCNC: 0.9 MG/DL (ref 0.5–1.4)
EOSINOPHIL NFR BLD MANUAL: 2 % (ref 0–8)
ERYTHROCYTE [DISTWIDTH] IN BLOOD BY AUTOMATED COUNT: 19.9 % (ref 11.5–14.5)
GFR SERPLBLD CREATININE-BSD FMLA CKD-EPI: >60 ML/MIN/1.73/M2
GLUCOSE SERPL-MCNC: 136 MG/DL (ref 70–110)
HCT VFR BLD AUTO: 27.6 % (ref 37–48.5)
HGB BLD-MCNC: 9.4 GM/DL (ref 12–16)
HYPOCHROMIA BLD QL SMEAR: ABNORMAL
LYMPHOCYTES NFR BLD MANUAL: 13 % (ref 18–48)
MCH RBC QN AUTO: 30.3 PG (ref 27–31)
MCHC RBC AUTO-ENTMCNC: 34.1 G/DL (ref 32–36)
MCV RBC AUTO: 89 FL (ref 82–98)
METAMYELOCYTES NFR BLD MANUAL: 1 %
MONOCYTES NFR BLD MANUAL: 1 % (ref 4–15)
NEUTROPHILS NFR BLD MANUAL: 83 % (ref 38–73)
NUCLEATED RBC (/100WBC) (OHS): 0 /100 WBC
PLATELET # BLD AUTO: 376 K/UL (ref 150–450)
PLATELET BLD QL SMEAR: ABNORMAL
PMV BLD AUTO: 10.7 FL (ref 9.2–12.9)
POTASSIUM SERPL-SCNC: 3 MMOL/L (ref 3.5–5.1)
PROT SERPL-MCNC: 6.9 GM/DL (ref 6–8.4)
RBC # BLD AUTO: 3.1 M/UL (ref 4–5.4)
SODIUM SERPL-SCNC: 132 MMOL/L (ref 136–145)
TARGETS BLD QL SMEAR: ABNORMAL
TOXIC GRANULES BLD QL SMEAR: PRESENT
WBC # BLD AUTO: 8.81 K/UL (ref 3.9–12.7)

## 2025-06-04 PROCEDURE — 36415 COLL VENOUS BLD VENIPUNCTURE: CPT | Mod: PN

## 2025-06-04 PROCEDURE — 80053 COMPREHEN METABOLIC PANEL: CPT | Mod: PN

## 2025-06-04 PROCEDURE — 85027 COMPLETE CBC AUTOMATED: CPT | Mod: PN

## 2025-06-05 ENCOUNTER — TELEPHONE (OUTPATIENT)
Dept: SURGERY | Facility: CLINIC | Age: 65
End: 2025-06-05
Payer: MEDICARE

## 2025-06-06 ENCOUNTER — ANESTHESIA EVENT (OUTPATIENT)
Dept: SURGERY | Facility: HOSPITAL | Age: 65
End: 2025-06-06
Payer: MEDICARE

## 2025-06-07 NOTE — ANESTHESIA PREPROCEDURE EVALUATION
06/06/2025  Justin Barnes is a 65 y.o., female.                Lab Results   Component Value Date    WBC 8.81 06/04/2025    HGB 9.4 (L) 06/04/2025    HCT 27.6 (L) 06/04/2025    MCV 89 06/04/2025     06/04/2025     BMP  Lab Results   Component Value Date     (L) 06/04/2025    K 3.0 (L) 06/04/2025    CL 97 06/04/2025    CO2 20 (L) 06/04/2025    BUN 12 06/04/2025    CREATININE 0.9 06/04/2025    CALCIUM 8.1 (L) 06/04/2025    ANIONGAP 15 06/04/2025     (H) 06/04/2025    GLU 77 07/14/2022     (H) 11/10/2021       No results found for this or any previous visit.         Pre-op Assessment    I have reviewed the Patient Summary Reports.     I have reviewed the Nursing Notes. I have reviewed the NPO Status.   I have reviewed the Medications.     Review of Systems  Anesthesia Hx:  No problems with previous Anesthesia             Denies Family Hx of Anesthesia complications.    Denies Personal Hx of Anesthesia complications.                    Social:  Smoker, No Alcohol Use Drug use: Hydrocodone      Hematology/Oncology:       -- Anemia:                  Current/Recent Cancer. (Biliary duct carcinoma)  -- :      Oncology: cervical cancer.           Oncology Comments: History of Cervical cancer     EENT/Dental:  EENT/Dental Normal           Cardiovascular:     Hypertension, poorly controlled   CAD           hyperlipidemia   ECG has been reviewed.  Patient on beta blockers                          Pulmonary:         Albuterol inhaler use               Renal/:     Hyponatremia  Hypokalemia             Hepatic/GI:        LFTs elevated         Biliary Disease, Biliary Obstruction, Cholangio Carcinoma     Musculoskeletal:  Arthritis   Lumbar postlaminectomy syndrome  Cervical radiculopathy   Joint Disease:  Arthritis, Osteoarthritis   Bone Disorders:    Osteopenia   Spine Disorders:  lumbar, cervical and thoracic Degenerative disease, Disc disease and Chronic Pain           Neurological:    Neuromuscular Disease,       Diabetic neuropathy.  MS stable per patient    Osteoarthritis  Peripheral Neuropathy                        Neuromuscular Disease, Multiple Sclerosis   Endocrine:  Diabetes, poorly controlled, type 2, using insulin         Obesity / BMI > 30  Psych:  Psychiatric History anxiety depression                Physical Exam  General: Well nourished, Cooperative, Alert and Oriented    Airway:  Mallampati: III   Mouth Opening: Normal  TM Distance: > 6 cm  Tongue: Normal  Neck ROM: Extension Decreased    Chest/Lungs:  Clear to auscultation, Normal Respiratory Rate    Heart:  Rate: Normal  Rhythm: Regular Rhythm        Anesthesia Plan  Type of Anesthesia, risks & benefits discussed:    Anesthesia Type: Gen ETT  Intra-op Monitoring Plan: Standard ASA Monitors  Post Op Pain Control Plan: multimodal analgesia and IV/PO Opioids PRN  Induction:  IV  Airway Plan: Video, Post-Induction  Informed Consent: Informed consent signed with the Patient and all parties understand the risks and agree with anesthesia plan.  All questions answered.   ASA Score: 3  Anesthesia Plan Notes: Will give Kcl 20 meq  GETA  No Decadron   No Ofirmev  Benadryl 6.25mg iv, Zofran 4mg iv, Pepcid 20mg iv   Sugammadex   Multiple sclerosis precautions    Ready For Surgery From Anesthesia Perspective.     .

## 2025-06-09 ENCOUNTER — ANESTHESIA (OUTPATIENT)
Dept: SURGERY | Facility: HOSPITAL | Age: 65
End: 2025-06-09
Payer: MEDICARE

## 2025-06-09 ENCOUNTER — HOSPITAL ENCOUNTER (OUTPATIENT)
Facility: HOSPITAL | Age: 65
Discharge: HOME | End: 2025-06-09
Attending: INTERNAL MEDICINE | Admitting: INTERNAL MEDICINE
Payer: MEDICARE

## 2025-06-09 ENCOUNTER — HOSPITAL ENCOUNTER (OUTPATIENT)
Dept: RADIOLOGY | Facility: HOSPITAL | Age: 65
Discharge: HOME | End: 2025-06-09
Attending: INTERNAL MEDICINE | Admitting: INTERNAL MEDICINE
Payer: MEDICARE

## 2025-06-09 VITALS
TEMPERATURE: 97 F | SYSTOLIC BLOOD PRESSURE: 169 MMHG | RESPIRATION RATE: 16 BRPM | OXYGEN SATURATION: 99 % | DIASTOLIC BLOOD PRESSURE: 78 MMHG | HEART RATE: 66 BPM

## 2025-06-09 DIAGNOSIS — C24.0: ICD-10-CM

## 2025-06-09 DIAGNOSIS — R10.9 ABDOMINAL PAIN: ICD-10-CM

## 2025-06-09 DIAGNOSIS — Z01.818 PRE-OP TESTING: ICD-10-CM

## 2025-06-09 LAB
ANION GAP (SMH): 12 MMOL/L (ref 8–16)
BUN SERPL-MCNC: 8 MG/DL (ref 8–23)
CALCIUM SERPL-MCNC: 8.9 MG/DL (ref 8.7–10.5)
CHLORIDE SERPL-SCNC: 98 MMOL/L (ref 95–110)
CO2 SERPL-SCNC: 22 MMOL/L (ref 23–29)
CREAT SERPL-MCNC: 0.9 MG/DL (ref 0.5–1.4)
GFR SERPLBLD CREATININE-BSD FMLA CKD-EPI: >60 ML/MIN/1.73/M2
GLUCOSE SERPL-MCNC: 136 MG/DL (ref 70–110)
POCT GLUCOSE: 151 MG/DL (ref 70–110)
POTASSIUM SERPL-SCNC: 2.8 MMOL/L (ref 3.5–5.1)
SODIUM SERPL-SCNC: 132 MMOL/L (ref 136–145)

## 2025-06-09 PROCEDURE — 37000009 HC ANESTHESIA EA ADD 15 MINS: Performed by: INTERNAL MEDICINE

## 2025-06-09 PROCEDURE — 80048 BASIC METABOLIC PNL TOTAL CA: CPT | Performed by: ANESTHESIOLOGY

## 2025-06-09 PROCEDURE — 43274 ERCP DUCT STENT PLACEMENT: CPT | Performed by: INTERNAL MEDICINE

## 2025-06-09 PROCEDURE — 43266 EGD ENDOSCOPIC STENT PLACE: CPT | Performed by: INTERNAL MEDICINE

## 2025-06-09 PROCEDURE — 37000008 HC ANESTHESIA 1ST 15 MINUTES: Performed by: INTERNAL MEDICINE

## 2025-06-09 PROCEDURE — 25500020 PHARM REV CODE 255: Performed by: INTERNAL MEDICINE

## 2025-06-09 PROCEDURE — 63600175 PHARM REV CODE 636 W HCPCS: Performed by: NURSE ANESTHETIST, CERTIFIED REGISTERED

## 2025-06-09 PROCEDURE — 74330 X-RAY BILE/PANC ENDOSCOPY: CPT | Mod: 26,,, | Performed by: RADIOLOGY

## 2025-06-09 PROCEDURE — 82962 GLUCOSE BLOOD TEST: CPT | Performed by: INTERNAL MEDICINE

## 2025-06-09 PROCEDURE — 25000003 PHARM REV CODE 250: Performed by: ANESTHESIOLOGY

## 2025-06-09 PROCEDURE — C1769 GUIDE WIRE: HCPCS | Performed by: INTERNAL MEDICINE

## 2025-06-09 PROCEDURE — 74330 X-RAY BILE/PANC ENDOSCOPY: CPT | Mod: TC

## 2025-06-09 PROCEDURE — C1876 STENT, NON-COA/NON-COV W/DEL: HCPCS | Performed by: INTERNAL MEDICINE

## 2025-06-09 RX ORDER — LIDOCAINE HYDROCHLORIDE 10 MG/ML
INJECTION, SOLUTION EPIDURAL; INFILTRATION; INTRACAUDAL; PERINEURAL
Status: DISCONTINUED | OUTPATIENT
Start: 2025-06-09 | End: 2025-06-09

## 2025-06-09 RX ORDER — ONDANSETRON HYDROCHLORIDE 2 MG/ML
4 INJECTION, SOLUTION INTRAVENOUS DAILY PRN
OUTPATIENT
Start: 2025-06-09

## 2025-06-09 RX ORDER — ONDANSETRON HYDROCHLORIDE 2 MG/ML
INJECTION, SOLUTION INTRAVENOUS
Status: DISCONTINUED | OUTPATIENT
Start: 2025-06-09 | End: 2025-06-09

## 2025-06-09 RX ORDER — MIDAZOLAM HYDROCHLORIDE 1 MG/ML
INJECTION INTRAMUSCULAR; INTRAVENOUS
Status: DISCONTINUED | OUTPATIENT
Start: 2025-06-09 | End: 2025-06-09

## 2025-06-09 RX ORDER — DIPHENHYDRAMINE HYDROCHLORIDE 50 MG/ML
12.5 INJECTION, SOLUTION INTRAMUSCULAR; INTRAVENOUS
Status: DISCONTINUED | OUTPATIENT
Start: 2025-06-09 | End: 2025-06-09 | Stop reason: HOSPADM

## 2025-06-09 RX ORDER — OXYCODONE HYDROCHLORIDE 5 MG/1
5 TABLET ORAL
Refills: 0 | OUTPATIENT
Start: 2025-06-09

## 2025-06-09 RX ORDER — GLUCAGON 1 MG
1 KIT INJECTION
OUTPATIENT
Start: 2025-06-09

## 2025-06-09 RX ORDER — POTASSIUM CHLORIDE 14.9 MG/ML
INJECTION INTRAVENOUS CONTINUOUS PRN
Status: DISCONTINUED | OUTPATIENT
Start: 2025-06-09 | End: 2025-06-09

## 2025-06-09 RX ORDER — GLUCAGON 1 MG
1 KIT INJECTION
Status: DISCONTINUED | OUTPATIENT
Start: 2025-06-09 | End: 2025-06-09 | Stop reason: HOSPADM

## 2025-06-09 RX ORDER — PROPOFOL 10 MG/ML
VIAL (ML) INTRAVENOUS
Status: DISCONTINUED | OUTPATIENT
Start: 2025-06-09 | End: 2025-06-09

## 2025-06-09 RX ORDER — ONDANSETRON HYDROCHLORIDE 2 MG/ML
4 INJECTION, SOLUTION INTRAVENOUS DAILY PRN
Status: DISCONTINUED | OUTPATIENT
Start: 2025-06-09 | End: 2025-06-09 | Stop reason: HOSPADM

## 2025-06-09 RX ORDER — FENTANYL CITRATE 50 UG/ML
25 INJECTION, SOLUTION INTRAMUSCULAR; INTRAVENOUS EVERY 5 MIN PRN
Status: DISCONTINUED | OUTPATIENT
Start: 2025-06-09 | End: 2025-06-09 | Stop reason: HOSPADM

## 2025-06-09 RX ORDER — FAMOTIDINE 10 MG/ML
INJECTION, SOLUTION INTRAVENOUS
Status: DISCONTINUED | OUTPATIENT
Start: 2025-06-09 | End: 2025-06-09

## 2025-06-09 RX ORDER — SUCCINYLCHOLINE CHLORIDE 20 MG/ML
INJECTION INTRAMUSCULAR; INTRAVENOUS
Status: DISCONTINUED | OUTPATIENT
Start: 2025-06-09 | End: 2025-06-09

## 2025-06-09 RX ORDER — HYDROMORPHONE HYDROCHLORIDE 1 MG/ML
0.2 INJECTION, SOLUTION INTRAMUSCULAR; INTRAVENOUS; SUBCUTANEOUS EVERY 5 MIN PRN
Refills: 0 | OUTPATIENT
Start: 2025-06-09

## 2025-06-09 RX ORDER — DIPHENHYDRAMINE HYDROCHLORIDE 50 MG/ML
12.5 INJECTION, SOLUTION INTRAMUSCULAR; INTRAVENOUS
OUTPATIENT
Start: 2025-06-09

## 2025-06-09 RX ORDER — POTASSIUM CHLORIDE 20 MEQ/1
40 TABLET, EXTENDED RELEASE ORAL ONCE
Status: COMPLETED | OUTPATIENT
Start: 2025-06-09 | End: 2025-06-09

## 2025-06-09 RX ADMIN — SODIUM CHLORIDE, SODIUM LACTATE, POTASSIUM CHLORIDE, AND CALCIUM CHLORIDE: .6; .31; .03; .02 INJECTION, SOLUTION INTRAVENOUS at 07:06

## 2025-06-09 RX ADMIN — POTASSIUM CHLORIDE: 14.9 INJECTION, SOLUTION INTRAVENOUS at 07:06

## 2025-06-09 RX ADMIN — ONDANSETRON 4 MG: 2 INJECTION INTRAMUSCULAR; INTRAVENOUS at 07:06

## 2025-06-09 RX ADMIN — PROPOFOL 150 MG: 10 INJECTION, EMULSION INTRAVENOUS at 07:06

## 2025-06-09 RX ADMIN — LIDOCAINE HYDROCHLORIDE 50 MG: 10 INJECTION, SOLUTION EPIDURAL; INFILTRATION; INTRACAUDAL; PERINEURAL at 07:06

## 2025-06-09 RX ADMIN — MIDAZOLAM HYDROCHLORIDE 2 MG: 1 INJECTION, SOLUTION INTRAMUSCULAR; INTRAVENOUS at 07:06

## 2025-06-09 RX ADMIN — FAMOTIDINE 20 MG: 10 INJECTION, SOLUTION INTRAVENOUS at 07:06

## 2025-06-09 RX ADMIN — Medication 120 MG: at 07:06

## 2025-06-09 RX ADMIN — POTASSIUM CHLORIDE 40 MEQ: 1500 TABLET, EXTENDED RELEASE ORAL at 09:06

## 2025-06-09 NOTE — TRANSFER OF CARE
Anesthesia Transfer of Care Note    Patient: Justin Barnes    Procedure(s) Performed: Procedure(s) (LRB):  ERCP (ENDOSCOPIC RETROGRADE CHOLANGIOPANCREATOGRAPHY) WITH STENT (N/A)    Patient location: PACU    Anesthesia Type: general    Transport from OR: Transported from OR on room air with adequate spontaneous ventilation    Post pain: adequate analgesia    Post assessment: no apparent anesthetic complications    Post vital signs: stable    Level of consciousness: awake    Nausea/Vomiting: no nausea/vomiting    Complications: none    Transfer of care protocol was followed    Last vitals: Visit Vitals  BP (!) 167/74 (BP Location: Left arm, Patient Position: Lying)   Pulse 71   Temp 37.1 °C (98.7 °F) (Tympanic)   Resp 16   SpO2 100%   Breastfeeding No

## 2025-06-09 NOTE — OP NOTE
**PROVATION NOT WORKING**    ENDOSCOPIC RETROGRADE CHOLANGIOPANCREATOGRAPHY PROCEDURE NOTE  Patient Name: Justin Barnes  Patient MRN: [id number]  Patient : 1960    Service date: 2025    Reason for Procedure: obstructive jaundice    PCP: Josue Andrews MD    No chief complaint on file.    PROCEDURE(S):  -Endoscopic retrograde cholangiopancreatography with stenting    MEDICATIONS:  -Anesthesia care, please see anesthesiology documentation    PATIENT MONITORING:  -Continuous telemetry, pulse oximetry, and blood pressure were performed.    INSTRUMENT:  -Olympus duodenoscope    PROCEDURE NOTE:  The procedure and its accompanying risks were explained to the patient and informed consent was obtained.  Cardiopulmonary assessment was adequate.  Time out was performed using the patient's name, birth date and procedure.  The patient was placed in the semiprone position.  Lead shielding was applied appropriate.  A  fluoroscopy film was obtained.  After adequate positioning was achieved, a duodenoscope was then inserted into the posterior oropharynx and advanced to the duodenum under direct visualization.  The ampulla was then located and the scope positioned in short  position.    A triple lumen sphincterotome preloaded with a 0.025 guidewire was used for cannulation.  The bile duct was selectively cannulated with ease.  The guidewire was advanced to the intrahepatic ducts.   A cholangiogram was performed. High grade stricture in distal 1/3 of CBD.    [employer] WallFlex 10 mm x 4 cm uncovered metal stent.  The biliary stent was placed successfully in a transpapillary position.    The guidewire and catheter were withdrawn.  The ampulla was inspected with no evidence of bleeding or perforation.  The duodenoscope was withdrawn into the stomach.  The stomach was decompressed.  The duodenoscope was completely removed and the procedure complete.  The patient tolerated the procedure well with no  immediate complications.  Post procedurefluoroscopy showed no free air around the liver or in the retroperitoneum.    CONDITION: Stable  COMPLICATIONS: None  ESTIMATED BLOOD LOSS: Minimal    FINDINGS:  1. Esophagus, Stomach, Duodenum:  -Limited endoscopy was unremarkable.    2. Ampulla:  -Dedicated views of the ampulla were obtained  -The ampulla was unremarkable    3. Cholangiogram:  -Limited views of the intrahepatic ducts were unremarkable  -The gallbladder was not evaluated  -Extrahepatic duct dilated measured approximately 14 mm  -Uncovered Metal bile duct stent placed as described above    4. Pancreatogram:  -Pancreatogram intentionally was not obtained.  -The pancreatic duct was not accessed with the guidewire during attempted cannulation.     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   -See above for details    RECOMMENDATIONS:  -Monitor in recovery  -D/c home and f/u w/ onc/surg    Eric OLEARY Dauterive III  6/9/2025  8:18 AM

## 2025-06-09 NOTE — ANESTHESIA POSTPROCEDURE EVALUATION
Anesthesia Post Evaluation    Patient: Justin Barnes    Procedure(s) Performed: Procedure(s) (LRB):  ERCP (ENDOSCOPIC RETROGRADE CHOLANGIOPANCREATOGRAPHY) WITH STENT (N/A)    Final Anesthesia Type: general      Patient location during evaluation: PACU  Patient participation: Yes- Able to Participate  Level of consciousness: awake and alert, oriented and awake  Post-procedure vital signs: reviewed and stable  Pain management: adequate  Airway patency: patent    PONV status at discharge: No PONV  Anesthetic complications: no      Cardiovascular status: blood pressure returned to baseline, hemodynamically stable and stable  Respiratory status: unassisted, spontaneous ventilation and room air  Hydration status: euvolemic  Follow-up not needed.              Vitals Value Taken Time   /76 06/09/25 08:20   Temp 36.2 °C (97.2 °F) 06/09/25 08:05   Pulse 71 06/09/25 08:30   Resp 22 06/09/25 08:30   SpO2 96 % 06/09/25 08:30   Vitals shown include unfiled device data.      No case tracking events are documented in the log.      Pain/Maurice Score: Maurice Score: 9 (6/9/2025  8:20 AM)

## 2025-06-09 NOTE — H&P
GASTROENTEROLOGY PRE-PROCEDURE H&P NOTE  Patient Name: Justin Barnes  Patient MRN: [id number]  Patient : 1960    Service date: 2025    PCP: Josue Andrews MD    No chief complaint on file.      HPI: Patient is a 65 y.o. female with PMHx as below here for evaluation of recently dx'd pancreatic/biliary cancer w/ + brushings for adenoCa w/ migrated biliary stent and recurrent jaundice. .     Past Medical History:  Past Medical History:   Diagnosis Date    Anxiety 3/17/2014    Cervical cancer     Combined hyperlipidemia associated with type 2 diabetes mellitus     Coronary artery disease, non-occlusive     DDD (degenerative disc disease), cervical 2014    DDD (degenerative disc disease), lumbar 2014    Depression     Diabetes mellitus, type 2     Encounter for blood transfusion     Hand arthritis     bilateral    Hypertension associated with diabetes     Lumbar postlaminectomy syndrome 2014    Multiple sclerosis 2014        Past Surgical History:  Past Surgical History:   Procedure Laterality Date    BACK SURGERY      x2, both lumbar    CARDIAC CATHETERIZATION      no CAD    CARPAL TUNNEL RELEASE Left     COLONOSCOPY      COLONOSCOPY N/A 2016    Procedure: COLONOSCOPY;  Surgeon: Jefe Sow MD;  Location: [facility];  Service: Endoscopy;  Laterality: N/A;    CYST REMOVAL      skin of back    Epidural Steroid injection      EPIDURAL STEROID INJECTION INTO CERVICAL SPINE N/A 2020    Procedure: Injection-steroid-epidural-cervical;  Surgeon: Jeffry Seaman MD;  Location: [facility] OR;  Service: Pain Management;  Laterality: N/A;    EPIDURAL STEROID INJECTION INTO CERVICAL SPINE N/A 10/15/2020    Procedure: Injection-steroid-epidural-cervical, C7/T1;  Surgeon: Jeffry Seaman MD;  Location: [facility] OR;  Service: Pain Management;  Laterality: N/A;    EPIDURAL STEROID INJECTION INTO CERVICAL SPINE N/A 8/3/2021    Procedure: Injection-steroid-epidural-cervical C7-T1  interlaminar;  Surgeon: Jeffry Seaman MD;  Location: [facility] OR;  Service: Pain Management;  Laterality: N/A;    EPIDURAL STEROID INJECTION INTO LUMBAR SPINE N/A 2/7/2025    Procedure: Injection-steroid-epidural-lumbar   L1/2;  Surgeon: Jeffry Seaman MD;  Location: [facility] OR;  Service: Pain Management;  Laterality: N/A;  normal    HYSTERECTOMY      with one ovary removed    JOINT REPLACEMENT Left 2019    left knee, 3 revisions    KNEE SURGERY Left     3 surgeries after tka-I&D, infection, hinged knee surgery    OOPHORECTOMY      2nd ovary removed 1 year after Hysterectomy    OTHER SURGICAL HISTORY      cervical epidural injection    TRANSFORAMINAL EPIDURAL INJECTION OF STEROID Bilateral 12/8/2020    Procedure: Injection,steroid,epidural,transforaminal approach, l1/2;  Surgeon: Jeffry Seaman MD;  Location: [facility] OR;  Service: Pain Management;  Laterality: Bilateral;    TRANSFORAMINAL EPIDURAL INJECTION OF STEROID Bilateral 1/27/2023    Procedure: Injection,steroid,epidural,transforaminal approach L1/2;  Surgeon: Jeffry Seaman MD;  Location: [facility] OR;  Service: Pain Management;  Laterality: Bilateral;    TUBAL LIGATION          Home Medications:  Prescriptions Prior to Admission[1]            Review of patient's allergies indicates:   Allergen Reactions    Amoxil [amoxicillin] Anaphylaxis and Itching    Penicillins Anaphylaxis    Latex, natural rubber Rash       Social History:   Social History     Occupational History    Not on file   Tobacco Use    Smoking status: Every Day     Current packs/day: 0.50     Average packs/day: 0.5 packs/day for 52.1 years (26.0 ttl pk-yrs)     Types: Cigarettes     Start date: 5/7/1973    Smokeless tobacco: Never    Tobacco comments:     Advised not to smoke DOS   Substance and Sexual Activity    Alcohol use: No    Drug use: Yes     Types: Hydrocodone    Sexual activity: Never     Partners: Male       Family History:   Family History   Problem Relation Name Age of  "Onset    Breast cancer Neg Hx      Ovarian cancer Neg Hx         Review of Systems:  A 10 point review of systems was performed and was normal, except as mentioned in the HPI, including constitutional, HEENT, heme, lymph, cardiovascular, respiratory, gastrointestinal, genitourinary, neurologic, endocrine, psychiatric and musculoskeletal.      OBJECTIVE:    Physical Exam:  24 Hour Vital Sign Ranges:    Most recent vitals: Breastfeeding No    GEN: well-developed, well-nourished, awake and alert, non-toxic appearing adult  HEENT: PERRL, sclera anicteric, oral mucosa pink and moist without lesion  NECK: trachea midline; Good ROM  CV: regular rate and rhythm, no murmurs or gallops  RESP: clear to auscultation bilaterally, no wheezes, rhonci or rales  ABD: soft, non-tender, non-distended, normal bowel sounds  EXT: no swelling or edema, 2+ pulses distally  SKIN: no rashes ; + jaundice  PSYCH: normal affect    Labs:   No results for input(s): "WBC", "MCV", "PLT" in the last 72 hours.    Invalid input(s): "HGBAU"  No results for input(s): "NA", "K", "CL", "CO2", "BUN", "GLU" in the last 72 hours.    Invalid input(s): "CREA"  No results for input(s): "ALB" in the last 72 hours.    Invalid input(s): "ALKP", "SGOT", "SGPT", "TBIL", "DBIL", "TPRO"  No results for input(s): "PT", "INR", "PTT" in the last 72 hours.      IMPRESSION / RECOMMENDATIONS:  ERCP with interventions as warranted.   RIsks, benefits, alternatives discussed in detail regarding upcoming procedures and sedation. Some of the more common endoscopic complications include but not limited to immediate or delayed perforation, bleeding, infections, pain, inadvertent injury to surrounding tissue / organs and possible need for surgical evaluation. Patient expressed understanding, all questions answered and will proceed with procedure as planned.     Eric Romero III  6/9/2025  7:06 AM           [1]   Medications Prior to Admission   Medication Sig Dispense Refill " Last Dose/Taking    albuterol (PROVENTIL/VENTOLIN HFA) 90 mcg/actuation inhaler INHALE 2 PUFFS BY MOUTH EVERY 6 HOURS AS NEEDED FOR SHORTNESS OF BREATH       AMLODIPINE BESYLATE, BULK, MISC 10 mg by Misc.(Non-Drug; Combo Route) route once daily.       blood sugar diagnostic (BLOOD GLUCOSE TEST) Strp Test glucose 1 x daily 100 strip prn     blood-glucose meter Misc Use as directed 1 each prn     diclofenac sodium (VOLTAREN) 1 % Gel APPLY 2 GRAMS TOPICALLY FOUR TIMES DAILY AS NEEDED FOR PAIN 300 g 5     DULoxetine (CYMBALTA) 60 MG capsule Take 60 mg by mouth once daily.       insulin detemir U-100 (LEVEMIR) 100 unit/mL injection Inject 30 Units into the skin every evening. Per pt       lancets Misc As directed 100 each prn     levocetirizine (XYZAL) 5 MG tablet Take 1 tablet (5 mg total) by mouth every evening. 30 tablet 11     methocarbamoL (ROBAXIN) 750 MG Tab TAKE 1 TABLET BY MOUTH 3 TIMES DAILY AS NEEDED 40 tablet 2     metoprolol tartrate (LOPRESSOR) 50 MG tablet Take 50 mg by mouth 2 (two) times daily.       oxyCODONE-acetaminophen (PERCOCET)  mg per tablet Take 1 tablet by mouth every 6 (six) hours as needed for Pain. 120 tablet 0     pioglitazone (ACTOS) 15 MG tablet Take 15 mg by mouth once daily.       pregabalin (LYRICA) 100 MG capsule TAKE ONE CAPSULE BY MOUTH 3 TIMES DAILY 90 capsule 2     SITagliptan-metformin (JANUMET) 50-1,000 mg per tablet Take 1 tablet by mouth 2 (two) times daily with meals.       venlafaxine 225 mg TR24 Take 1 tablet by mouth once daily. (Patient taking differently: Take 1 tablet by mouth every evening.) 90 each 1

## 2025-06-09 NOTE — ANESTHESIA PROCEDURE NOTES
Intubation    Date/Time: 6/9/2025 7:37 AM    Performed by: Wing Angeles CRNA  Authorized by: Evans Vallejo MD    Intubation:     Induction:  Intravenous    Intubated:  Postinduction    Mask Ventilation:  Not attempted    Attempts:  1    Attempted By:  CRNA    Method of Intubation:  Fiberoptic    Blade:  Garcia 3    Laryngeal View Grade: Grade I - full view of cords      Difficult Airway Encountered?: No      Complications:  None    Airway Device:  Oral endotracheal tube    Airway Device Size:  7.0    Style/Cuff Inflation:  Cuffed (inflated to minimal occlusive pressure)    Inflation Amount (mL):  8    Tube secured:  21    Secured at:  The lips    Placement Verified By:  Capnometry and Fiber optic visualization    Complicating Factors:  None    Findings Post-Intubation:  BS equal bilateral and atraumatic/condition of teeth unchanged

## 2025-06-09 NOTE — PLAN OF CARE
Patient taken to endo department via stretcher at this time. Awake and alert not distressful. Kalie RN at bedside to assume care of patient

## 2025-06-11 NOTE — PROGRESS NOTES
PATIENT: Justin Barnes  MRN: [id number]  DATE: 6/12/2025      Diagnosis:   1. Primary adenocarcinoma of common bile duct    2. Itching    3. Dysuria    4. Nausea    5. Hypertension, unspecified type    6. Debility    7. Pain and swelling of right shoulder    8. Other abnormal tumor markers    9. Severe obesity (BMI 35.0-39.9) with comorbidity        Chief Complaint: Establish Care (Cholangiocarcinoma)      Oncologic History:      Oncologic History     Oncologic Treatment     Pathology           Subjective:    Initial History: Mrs. Barnes is a 65 y.o. female with Anxiety, h/o cervical cancer, DMII, HLD, CAD, depression, arthritis, HTN, MS who presents for cholangiocarcinoma.  Patient initially presented to [facility] on 05/01/2025 with complaint of jaundice and dark colored urine.  CT abdomen and pelvis on 05/01/2025 showed dilated biliary ducts as well as common bile duct.  MRI abdomen on 05/01/2025 showed severe intra and extrahepatic biliary ductal dilatation tapering around the common bile duct at the level of the pancreatic head.  ERCP was performed on 05/02/2025 with brushings obtained from common bile duct and biliary stent placed.  The patient was discharged and presented back to the hospital on 05/04/2025 with GI bleeding.  The patient was admitted to the hospital from 05/04/2025 until 05/07/2025.  The patient underwent CT chest on 06/03/2025 showing 4 mm nodule.  The patient met with Dr. Baker on 06/04/2025 who deemed the patient not to be a surgical candidate due to concern for inability to recover from such an extensive surgery.  The patient underwent repeat ERCP on 06/09/2025 for elevated bilirubin under the care of Dr. Romero with metal stent placed.    Currently the patient endorses decreased appetite, chills, occasional night sweats, weight loss, leg swelling, persistent lower abdominal pain with the associated constipation, occasional diarrhea, nausea, dysuria, arthritis, jaundice,  itching, and occasional dizziness.  The patient denies CP, cough, SOB, vomiting.  The patient denies fever, new lumps or bumps, easy bruising or bleeding.    Past Medical History:   Past Medical History:   Diagnosis Date    Anxiety 03/17/2014    Cancer     Cervical cancer 1996    Combined hyperlipidemia associated with type 2 diabetes mellitus     Coronary artery disease, non-occlusive     DDD (degenerative disc disease), cervical 02/24/2014    DDD (degenerative disc disease), lumbar 02/24/2014    Depression     Diabetes mellitus, type 2     Encounter for blood transfusion     Hand arthritis     bilateral    Hypertension associated with diabetes     Lumbar postlaminectomy syndrome 02/24/2014    Multiple sclerosis 02/24/2014       Past Surgical HIstory:   Past Surgical History:   Procedure Laterality Date    BACK SURGERY      a/p fusion at 3 level 30 yrs ago    CARDIAC CATHETERIZATION      no CAD    CARPAL TUNNEL RELEASE Left     COLONOSCOPY      COLONOSCOPY N/A 07/26/2016    Procedure: COLONOSCOPY;  Surgeon: Jefe Sow MD;  Location: [facility];  Service: Endoscopy;  Laterality: N/A;    CYST REMOVAL      skin of back    Epidural Steroid injection      EPIDURAL STEROID INJECTION INTO CERVICAL SPINE N/A 02/14/2020    Procedure: Injection-steroid-epidural-cervical;  Surgeon: Jeffry Seaman MD;  Location: [facility] OR;  Service: Pain Management;  Laterality: N/A;    EPIDURAL STEROID INJECTION INTO CERVICAL SPINE N/A 10/15/2020    Procedure: Injection-steroid-epidural-cervical, C7/T1;  Surgeon: Jeffry Seaman MD;  Location: [facility] OR;  Service: Pain Management;  Laterality: N/A;    EPIDURAL STEROID INJECTION INTO CERVICAL SPINE N/A 08/03/2021    Procedure: Injection-steroid-epidural-cervical C7-T1 interlaminar;  Surgeon: Jeffry Seaman MD;  Location: [facility] OR;  Service: Pain Management;  Laterality: N/A;    EPIDURAL STEROID INJECTION INTO LUMBAR SPINE N/A 02/07/2025    Procedure:  Injection-steroid-epidural-lumbar   L1/2;  Surgeon: Jeffry Seaman MD;  Location: [facility] OR;  Service: Pain Management;  Laterality: N/A;  normal    ERCP N/A 06/09/2025    Procedure: ERCP (ENDOSCOPIC RETROGRADE CHOLANGIOPANCREATOGRAPHY) WITH STENT;  Surgeon: Eric Romero III, MD;  Location: [facility];  Service: Endoscopy;  Laterality: N/A;  STENT    HYSTERECTOMY      with one ovary removed    JOINT REPLACEMENT Left 2019    left knee, 3 revisions    KNEE SURGERY Left     3 surgeries after tka-I&D, infection, hinged knee surgery    OOPHORECTOMY      2nd ovary removed 1 year after Hysterectomy    OTHER SURGICAL HISTORY      cervical epidural injection    TRANSFORAMINAL EPIDURAL INJECTION OF STEROID Bilateral 12/08/2020    Procedure: Injection,steroid,epidural,transforaminal approach, l1/2;  Surgeon: Jeffry Seaman MD;  Location: [facility] OR;  Service: Pain Management;  Laterality: Bilateral;    TRANSFORAMINAL EPIDURAL INJECTION OF STEROID Bilateral 01/27/2023    Procedure: Injection,steroid,epidural,transforaminal approach L1/2;  Surgeon: Jeffry Seaman MD;  Location: [facility] OR;  Service: Pain Management;  Laterality: Bilateral;    TUBAL LIGATION         Family History:   Family History   Problem Relation Name Age of Onset    Breast cancer Neg Hx      Ovarian cancer Neg Hx         Social History:  reports that she has been smoking cigarettes. She started smoking about 52 years ago. She has a 26 pack-year smoking history. She has never used smokeless tobacco. She reports current drug use. Drug: Hydrocodone. She reports that she does not drink alcohol.    Allergies:  Review of patient's allergies indicates:   Allergen Reactions    Amoxil [amoxicillin] Anaphylaxis and Itching    Penicillins Anaphylaxis    Latex, natural rubber Rash       Medications:  Current Medications[1]    Review of Systems   Constitutional:  Positive for appetite change, chills, diaphoresis and unexpected weight change. Negative for  "fatigue and fever.   HENT:  Negative for mouth sores, sore throat and trouble swallowing.    Eyes:  Negative for photophobia, pain and visual disturbance.   Respiratory:  Negative for cough, chest tightness and shortness of breath.    Cardiovascular:  Positive for leg swelling. Negative for chest pain and palpitations.   Gastrointestinal:  Positive for abdominal pain (lower, constant), constipation, diarrhea and nausea. Negative for vomiting.   Genitourinary:  Positive for dysuria and frequency. Negative for difficulty urinating.   Musculoskeletal:  Positive for arthralgias (shoulders, hips, knees). Negative for back pain.   Skin:  Positive for color change (jaundiced). Negative for rash.        itching   Neurological:  Positive for dizziness. Negative for weakness, numbness and headaches.   Hematological:  Negative for adenopathy.   Psychiatric/Behavioral:  The patient is nervous/anxious.        ECOG Performance Status: 3   Objective:      Vitals:   Vitals:    06/12/25 1004   BP: (!) 160/90   BP Location: Right forearm   Patient Position: Sitting   Pulse: 75   Resp: 18   Temp: 97.8 °F (36.6 °C)   SpO2: 97%   Weight: 93.6 kg (206 lb 5.6 oz)   Height: 5' 1" (1.549 m)       Physical Exam  Constitutional:       General: She is not in acute distress.     Appearance: She is well-developed. She is not diaphoretic.      Comments: Difficulty ambulating   HENT:      Head: Normocephalic and atraumatic.   Eyes:      General: Scleral icterus present.         Right eye: No discharge.         Left eye: No discharge.   Cardiovascular:      Rate and Rhythm: Normal rate and regular rhythm.      Heart sounds: Normal heart sounds. No murmur heard.     No friction rub. No gallop.   Pulmonary:      Effort: Pulmonary effort is normal. No respiratory distress.      Breath sounds: Normal breath sounds. No wheezing or rales.   Chest:      Chest wall: No tenderness.   Abdominal:      General: Bowel sounds are normal. There is no distension. "      Palpations: Abdomen is soft. There is no mass.      Tenderness: There is no abdominal tenderness. There is no guarding or rebound.   Musculoskeletal:         General: No tenderness. Normal range of motion.   Lymphadenopathy:      Cervical: No cervical adenopathy.      Upper Body:      Right upper body: No supraclavicular or axillary adenopathy.      Left upper body: No supraclavicular or axillary adenopathy.   Skin:     Coloration: Skin is jaundiced.      Findings: No erythema or rash.   Neurological:      Mental Status: She is alert and oriented to person, place, and time.   Psychiatric:         Behavior: Behavior normal.         Laboratory Data:  Lab Visit on 06/12/2025   Component Date Value Ref Range Status    Sodium 06/12/2025 132 (L)  136 - 145 mmol/L Final    Potassium 06/12/2025 4.5  3.5 - 5.1 mmol/L Final    Chloride 06/12/2025 98  95 - 110 mmol/L Final    CO2 06/12/2025 22 (L)  23 - 29 mmol/L Final    Glucose 06/12/2025 111 (H)  70 - 110 mg/dL Final    BUN 06/12/2025 15  8 - 23 mg/dL Final    Creatinine 06/12/2025 0.7  0.5 - 1.4 mg/dL Final    Calcium 06/12/2025 8.6 (L)  8.7 - 10.5 mg/dL Final    Protein Total 06/12/2025 7.9  6.0 - 8.4 gm/dL Final    Albumin 06/12/2025 2.7 (L)  3.5 - 5.2 g/dL Final    Bilirubin Total 06/12/2025 9.3 (H)  0.1 - 1.0 mg/dL Final    For infants and newborns, interpretation of results should be based   on gestational age, weight and in agreement with clinical   observations.    Premature Infant recommended reference ranges:   0-24 hours:  <8.0 mg/dL   24-48 hours: <12.0 mg/dL   3-5 days:    <15.0 mg/dL   6-29 days:   <15.0 mg/dL    ALP 06/12/2025 531 (H)  40 - 150 unit/L Final    AST 06/12/2025 98 (H)  11 - 45 unit/L Final    ALT 06/12/2025 81 (H)  10 - 44 unit/L Final    Anion Gap 06/12/2025 12  8 - 16 mmol/L Final    UNABLE TO CALCULATE    eGFR 06/12/2025 >60  >60 mL/min/1.73/m2 Final    Estimated GFR calculated using the CKD-EPI creatinine (2021) equation.   Lab Visit on  06/12/2025   Component Date Value Ref Range Status    Color, UA 06/12/2025 Brown (A)  Straw, Adrianne, Yellow, Light-Orange Final    Appearance, UA 06/12/2025 Clear  Clear Final    pH, UA 06/12/2025 7.0  5.0 - 8.0 Final    Spec Grav UA 06/12/2025 1.025  1.005 - 1.030 Final    Protein, UA 06/12/2025 1+ (A)  Negative Final    Recommend a 24 hour urine protein or a urine protein/creatinine ratio if globulin induced proteinuria is clinically suspected.    Glucose, UA 06/12/2025 Negative  Negative Final    Ketones, UA 06/12/2025 Trace (A)  Negative Final    Bilirubin, UA 06/12/2025 3+ (A)  Negative Final    Positive urine bilirubin is not confirmed. Correlate with serum bilirubin and clinical presentation.    Blood, UA 06/12/2025 Negative  Negative Final    Nitrites, UA 06/12/2025 Negative  Negative Final    Leukocyte Esterase, UA 06/12/2025 Trace (A)  Negative Final    RBC, UA 06/12/2025 0  0 - 4 /HPF Final    WBC, UA 06/12/2025 5  0 - 5 /HPF Final    Bacteria, UA 06/12/2025 Rare  None, Rare, Occasional /HPF Final    Squamous Epithelial Cells, UA 06/12/2025 6  /HPF Final    Hyaline Casts, UA 06/12/2025 3 (H)  0 - 1 /LPF Final    Microscopic Comment 06/12/2025    Final    Other formed elements not mentioned in the report are not present in the microscopic examination.   Admission on 06/09/2025, Discharged on 06/09/2025   Component Date Value Ref Range Status    Sodium 06/09/2025 132 (L)  136 - 145 mmol/L Final    Potassium 06/09/2025 2.8 (LL)  3.5 - 5.1 mmol/L Final    Critical result K 2.8 mmol/L called to and read back by jeremy crockett rn at 09-Jun-2025 08:47 by [facility].  Result Rechecked    Chloride 06/09/2025 98  95 - 110 mmol/L Final    CO2 06/09/2025 22 (L)  23 - 29 mmol/L Final    Glucose 06/09/2025 136 (H)  70 - 110 mg/dL Final    BUN 06/09/2025 8  8 - 23 mg/dL Final    Creatinine 06/09/2025 0.9  0.5 - 1.4 mg/dL Final    Calcium 06/09/2025 8.9  8.7 - 10.5 mg/dL Final    Anion Gap 06/09/2025 12  8 - 16 mmol/L  Final    eGFR 06/09/2025 >60  >60 mL/min/1.73/m2 Final    POCT Glucose 06/09/2025 151 (H)  70 - 110 mg/dL Final         Imaging:     CTA abdomen and pelvis 5/04/25    ABDOMEN FINDINGS: Atheromatous changes are present within the abdominal aorta without evidence of aneurysm, dissection or stenosis. The celiac artery, superior mesenteric artery, and inferior mesenteric artery are patent without evidence of significant   stenosis.  The renal arteries are patent without evidence of significant stenosis.  Intrahepatic and extra hepatic biliary dilatation is present. Recently placed biliary stent is not visualized within the common duct and has migrated distally into a loop   of small bowel located anteriorly within the lower abdomen slightly to the right of midline. Calcified granulomata are present within the spleen. There is thickening of the adrenal glands without a defined focal mass. Postoperative changes of the   lumbosacral fusion are present.     PELVIS FINDINGS: Atherosclerotic plaque is present within the common iliac, internal iliac and external iliac arteries. There is no evidence of stenosis of the common iliac or external iliac arteries. Calcified plaques also noted within the common   femoral arteries without stenosis. Diverticula are present in the sigmoid colon without evidence of diverticulitis or active extravasation of contrast.     CT Chest 6/03/25  The heart and great vessels are of normal size and contour. Enlargement or aneurysm is not seen. Coronary artery calcification is prominent. Adenopathy or soft tissue masses in the mediastinum are not seen.     A 4 mm nodule seen on the prior CT chest is seen to be a calcified granuloma on today's study. No soft tissue density masses or nodules are seen. No infiltrate or atelectasis is noted. The interstitial density is within normal limits. No pneumothorax or pleural effusion is seen.     Chronic degenerative disc disease and spondylosis is seen from  T8-L2. There is mild thoracolumbar scoliosis. Osteoarthritic changes are seen at both glenohumeral joints.       Assessment:       1. Primary adenocarcinoma of common bile duct    2. Itching    3. Dysuria    4. Nausea    5. Hypertension, unspecified type    6. Debility    7. Pain and swelling of right shoulder    8. Other abnormal tumor markers    9. Severe obesity (BMI 35.0-39.9) with comorbidity           Plan:     Cholangiocarcinoma - the patient with adenocarcinoma of the common bile duct diagnosed on ERCP 05/02/2025  -Patient saw Dr. Baker with surgical oncology on 05/04/2025 whom was deemed not to be a great surgical candidate due to performance status  -The patient undergo PET-CT pattern initiating therapy   -Patient see palliative care   -[facility]us circulating tumor DNA testing to be performed as well as [facility] RNA germline testing  -Pharmacogenomics pending  -The patient to follow up after PET-CT completed    Biliary Obstruction - the patient with repeat ERCP on 06/09/2025 under the care of Dr. Romero with metal stent placed   -Repeat bilirubin improved to 9.3   -Will continue to monitor    HTN - pt on amlodipine, metoprolol   -BP slightly elevated  -Will monitor    DMII - pt on insulin  -Management per PCP    CAD - pt on metoprolol, Amlodipine  -Management per cardiology    Itching - likely due to hyperbilirubinemia  -Will prescribe hydroxyzine  -should improve as bilirubin goes down    Nausea - will prescribe zofran  -Will monitor    Dysuria - UA negative for UTI  -Will monitor    Debility - due to cancer and arthritis  -Pt to undergo outpatient PT/OT    Right Shoulder Swelling - pt to follow up with Dr Bay    Route Chart for Scheduling    Med Onc Chart Routing      Follow up with physician Other. Pt needs blood work today with CBC, CMP, CEA, , TEMPUS ctDNA, TEMPUS RNA and pharmacogenomic.  Pt needs a PET/CT with appt with me once completed.  Pt needs an appt with orthopedics Dr Bay.  Pt  needs appt with palliative care.Pt needs outpatient PT/OT set up.   Follow up with MIKA    Infusion scheduling note    Injection scheduling note    Labs    Imaging    Pharmacy appointment    Other referrals                     Dann Grady MD  [facility]  Hematology and Oncology  [facility]   [address] [address]   O: [phone]  F: [phone]           [1]   Current Outpatient Medications   Medication Sig Dispense Refill    albuterol (PROVENTIL/VENTOLIN HFA) 90 mcg/actuation inhaler INHALE 2 PUFFS BY MOUTH EVERY 6 HOURS AS NEEDED FOR SHORTNESS OF BREATH      AMLODIPINE BESYLATE, BULK, MISC 10 mg by Misc.(Non-Drug; Combo Route) route once daily.      blood sugar diagnostic (BLOOD GLUCOSE TEST) Strp Test glucose 1 x daily 100 strip prn    diclofenac sodium (VOLTAREN) 1 % Gel APPLY 2 GRAMS TOPICALLY FOUR TIMES DAILY AS NEEDED FOR PAIN 300 g 5    DULoxetine (CYMBALTA) 60 MG capsule Take 60 mg by mouth once daily.      insulin detemir U-100 (LEVEMIR) 100 unit/mL injection Inject 30 Units into the skin every evening. Per pt      lancets Misc As directed 100 each prn    methocarbamoL (ROBAXIN) 750 MG Tab TAKE 1 TABLET BY MOUTH 3 TIMES DAILY AS NEEDED 40 tablet 2    metoprolol tartrate (LOPRESSOR) 50 MG tablet Take 50 mg by mouth 2 (two) times daily.      oxyCODONE-acetaminophen (PERCOCET)  mg per tablet Take 1 tablet by mouth every 6 (six) hours as needed for Pain. 120 tablet 0    pioglitazone (ACTOS) 15 MG tablet Take 15 mg by mouth once daily.      pregabalin (LYRICA) 100 MG capsule TAKE ONE CAPSULE BY MOUTH 3 TIMES DAILY 90 capsule 2    SITagliptan-metformin (JANUMET) 50-1,000 mg per tablet Take 1 tablet by mouth 2 (two) times daily with meals.      venlafaxine 225 mg TR24 Take 1 tablet by mouth once daily. (Patient taking differently: Take 1 tablet by mouth every evening.) 90 each 1    blood-glucose meter Misc Use as directed 1 each prn    hydrOXYzine HCL  (ATARAX) 25 MG tablet Take 1 tablet (25 mg total) by mouth 4 (four) times daily as needed for Itching. 90 tablet 3    levocetirizine (XYZAL) 5 MG tablet Take 1 tablet (5 mg total) by mouth every evening. 30 tablet 11    ondansetron (ZOFRAN-ODT) 4 MG TbDL Take 1 tablet (4 mg total) by mouth every 6 (six) hours as needed (nausea). 60 tablet 3     No current facility-administered medications for this visit.

## 2025-06-12 ENCOUNTER — OFFICE VISIT (OUTPATIENT)
Dept: HEMATOLOGY/ONCOLOGY | Facility: CLINIC | Age: 65
End: 2025-06-12
Payer: MEDICARE

## 2025-06-12 ENCOUNTER — TELEPHONE (OUTPATIENT)
Dept: REHABILITATION | Facility: HOSPITAL | Age: 65
End: 2025-06-12
Payer: MEDICARE

## 2025-06-12 ENCOUNTER — TELEPHONE (OUTPATIENT)
Dept: ORTHOPEDICS | Facility: CLINIC | Age: 65
End: 2025-06-12
Payer: MEDICARE

## 2025-06-12 ENCOUNTER — TELEPHONE (OUTPATIENT)
Dept: HEMATOLOGY/ONCOLOGY | Facility: CLINIC | Age: 65
End: 2025-06-12

## 2025-06-12 VITALS
HEART RATE: 75 BPM | TEMPERATURE: 98 F | OXYGEN SATURATION: 97 % | SYSTOLIC BLOOD PRESSURE: 160 MMHG | RESPIRATION RATE: 18 BRPM | HEIGHT: 61 IN | DIASTOLIC BLOOD PRESSURE: 90 MMHG | WEIGHT: 206.38 LBS | BODY MASS INDEX: 38.96 KG/M2

## 2025-06-12 DIAGNOSIS — C24.0 PRIMARY ADENOCARCINOMA OF COMMON BILE DUCT: Primary | ICD-10-CM

## 2025-06-12 DIAGNOSIS — E66.01 SEVERE OBESITY (BMI 35.0-39.9) WITH COMORBIDITY: ICD-10-CM

## 2025-06-12 DIAGNOSIS — L29.9 ITCHING: ICD-10-CM

## 2025-06-12 DIAGNOSIS — R11.0 NAUSEA: ICD-10-CM

## 2025-06-12 DIAGNOSIS — M25.411 PAIN AND SWELLING OF RIGHT SHOULDER: ICD-10-CM

## 2025-06-12 DIAGNOSIS — R97.8 OTHER ABNORMAL TUMOR MARKERS: ICD-10-CM

## 2025-06-12 DIAGNOSIS — R53.81 DEBILITY: ICD-10-CM

## 2025-06-12 DIAGNOSIS — R30.0 DYSURIA: ICD-10-CM

## 2025-06-12 DIAGNOSIS — I10 HYPERTENSION, UNSPECIFIED TYPE: ICD-10-CM

## 2025-06-12 DIAGNOSIS — M25.511 PAIN AND SWELLING OF RIGHT SHOULDER: ICD-10-CM

## 2025-06-12 PROCEDURE — 3080F DIAST BP >= 90 MM HG: CPT | Mod: CPTII,S$GLB,, | Performed by: INTERNAL MEDICINE

## 2025-06-12 PROCEDURE — 1101F PT FALLS ASSESS-DOCD LE1/YR: CPT | Mod: CPTII,S$GLB,, | Performed by: INTERNAL MEDICINE

## 2025-06-12 PROCEDURE — 3008F BODY MASS INDEX DOCD: CPT | Mod: CPTII,S$GLB,, | Performed by: INTERNAL MEDICINE

## 2025-06-12 PROCEDURE — 3077F SYST BP >= 140 MM HG: CPT | Mod: CPTII,S$GLB,, | Performed by: INTERNAL MEDICINE

## 2025-06-12 PROCEDURE — 1125F AMNT PAIN NOTED PAIN PRSNT: CPT | Mod: CPTII,S$GLB,, | Performed by: INTERNAL MEDICINE

## 2025-06-12 PROCEDURE — 99999 PR PBB SHADOW E&M-EST. PATIENT-LVL V: CPT | Mod: PBBFAC,,, | Performed by: INTERNAL MEDICINE

## 2025-06-12 PROCEDURE — 99205 OFFICE O/P NEW HI 60 MIN: CPT | Mod: S$GLB,,, | Performed by: INTERNAL MEDICINE

## 2025-06-12 PROCEDURE — 1160F RVW MEDS BY RX/DR IN RCRD: CPT | Mod: CPTII,S$GLB,, | Performed by: INTERNAL MEDICINE

## 2025-06-12 PROCEDURE — 3288F FALL RISK ASSESSMENT DOCD: CPT | Mod: CPTII,S$GLB,, | Performed by: INTERNAL MEDICINE

## 2025-06-12 PROCEDURE — 1159F MED LIST DOCD IN RCRD: CPT | Mod: CPTII,S$GLB,, | Performed by: INTERNAL MEDICINE

## 2025-06-12 RX ORDER — HYDROXYZINE HYDROCHLORIDE 25 MG/1
25 TABLET, FILM COATED ORAL 4 TIMES DAILY PRN
Qty: 90 TABLET | Refills: 3 | Status: SHIPPED | OUTPATIENT
Start: 2025-06-12

## 2025-06-12 RX ORDER — ONDANSETRON 4 MG/1
4 TABLET, ORALLY DISINTEGRATING ORAL EVERY 6 HOURS PRN
Qty: 60 TABLET | Refills: 3 | Status: SHIPPED | OUTPATIENT
Start: 2025-06-12

## 2025-06-12 NOTE — TELEPHONE ENCOUNTER
Called pt to get her scheduled for New PT/funct eval; spoke with her son Nilo, he said that the pt has a lot of apts scheduled and pt would have to call me back to schedule her New PT/funct eval. I will await pt's call.

## 2025-06-12 NOTE — TELEPHONE ENCOUNTER
----- Message from Renard Churchill sent at 6/12/2025 11:26 AM CDT -----  Pt needs an appt with orthopedics Dr Bay.

## 2025-06-12 NOTE — TELEPHONE ENCOUNTER
I called the patient and left her a message to call me back at [phone]     Dann Grady MD  [facility]  Hematology and Oncology  [facility]   [address] [place] [address]   O: [phone]  F: [phone]

## (undated) DEVICE — TRAY NERVE BLOCK

## (undated) DEVICE — TOWEL OR DISP STRL BLUE 4/PK

## (undated) DEVICE — GLOVE SURGICAL LATEX SZ 7

## (undated) DEVICE — NDL SPINAL SPINOCAN 22GX3.5

## (undated) DEVICE — SYR GLASS 5CC LUER LOK

## (undated) DEVICE — MARKER SKIN STND TIP BLUE BARR

## (undated) DEVICE — APPLICATOR CHLORAPREP CLR 10.5

## (undated) DEVICE — NDL TUOHY EPIDURAL 20G X 3.5

## (undated) DEVICE — GLOVE SENSICARE PI MICRO 7

## (undated) DEVICE — SEE MEDLINE ITEM 152622

## (undated) DEVICE — MARKER SKIN RULER STERILE